# Patient Record
Sex: FEMALE | Race: WHITE | Employment: OTHER | URBAN - METROPOLITAN AREA
[De-identification: names, ages, dates, MRNs, and addresses within clinical notes are randomized per-mention and may not be internally consistent; named-entity substitution may affect disease eponyms.]

---

## 2017-01-13 ENCOUNTER — OFFICE VISIT (OUTPATIENT)
Dept: SURGERY | Age: 52
End: 2017-01-13

## 2017-01-13 VITALS
DIASTOLIC BLOOD PRESSURE: 82 MMHG | RESPIRATION RATE: 18 BRPM | WEIGHT: 231 LBS | SYSTOLIC BLOOD PRESSURE: 124 MMHG | BODY MASS INDEX: 42.51 KG/M2 | TEMPERATURE: 98.1 F | HEART RATE: 84 BPM | HEIGHT: 62 IN | OXYGEN SATURATION: 98 %

## 2017-01-13 DIAGNOSIS — Z98.890 STATUS POST LAPAROSCOPIC NISSEN FUNDOPLICATION: ICD-10-CM

## 2017-01-13 DIAGNOSIS — K22.4 HYPERTONIC ESOPHAGEAL MOTILITY DISORDER: Primary | ICD-10-CM

## 2017-01-13 DIAGNOSIS — K22.4 ESOPHAGEAL SPASM: ICD-10-CM

## 2017-01-13 DIAGNOSIS — K22.4 JACKHAMMER ESOPHAGUS: ICD-10-CM

## 2017-01-13 NOTE — PROGRESS NOTES
Returns to discuss abnormal esophageal manometry results and correlate with Sx  Findings:     1. Esophago-Gastric junction: There is incomplete swallow induced relaxation of the Esophago-gastric junction. The mean IRP is 16.2 (normal is less than 15).     2. Esophageal body: All swallows show normal intact contraction patterns and normal peristaltic vigour. 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 There is no suggestion of distal esophageal spasm.      3. Impedance: There is complete bolus clearance in 8/10 of the swallows     Impression:     1. EGJ outflow obstruction-high IRP, with normal peristalsis. Suggestion of hypercontractile disorder, thought not meeting criteria on this study.     2. Complete clearance of the bolus indicating adequate bolus transit.       Visit Vitals    /82 (BP 1 Location: Left arm, BP Patient Position: Sitting)    Pulse 84    Temp 98.1 °F (36.7 °C) (Oral)    Resp 18    Ht 5' 2\" (1.575 m)    Wt 231 lb (104.8 kg)    SpO2 98%    BMI 42.25 kg/m2     She has lost minimal weight following the procedure done in 12/2014  She has globus sensation intermittently, heartburn is better with prilosec daily now,  Regurgitates at times- usually liquid material. Does agree that dysphagia is worst with liquids and solids  Her diet is now soft processed foods pudding consistency in order to avoid regurgitation spells    Review of records includes  Op report Dec 2014  Procedure: Laparoscopic hiatal hernia repair with Nissen fundic plication and plication of the blind jejunal limb, Repair of Incisional hernia with mesh, Intra-op EGD   Pre-operative Diagnosis: Gastroesophageal reflux disease / afferent limb syndrome, hiatal hernia, incisional hernia   Findings: Hiatal hernia moderate sliding, mobilized gastric fundus to perform fundic wrap with enlarged gastric pouch, elongated afferent blind limb of jejunum, multiple small midline hernia defects found     UGI 2/83 before fundoplication/HH repair      12/2014 UGI after [facility] repair/Nissen      Most recent UGI series- lateral view      Anterior view      Active Ambulatory Problems     Diagnosis Date Noted    Abdominal pain 05/23/2014    GERD (gastroesophageal reflux disease) 12/22/2014    Gastric bypass status for obesity 08/02/2016    Status post Nissen fundoplication 98/09/1172    Reactive hypoglycemia 08/02/2016    Psychiatric disorder     Asthma     Anemia     Hiatal hernia 08/28/2016     Resolved Ambulatory Problems     Diagnosis Date Noted    No Resolved Ambulatory Problems     Past Medical History   Diagnosis Date    Bladder spasms     Unspecified adverse effect of anesthesia     Urinary frequency      Current Outpatient Prescriptions on File Prior to Visit   Medication Sig Dispense Refill    omeprazole (PRILOSEC) 20 mg capsule Take 1 Cap by mouth two (2) times a day. 60 Cap 3    trospium (SANCTURA XL) 60 mg capsule Take 1 Cap by mouth Daily (before breakfast). 30 Cap 6    Lisdexamfetamine (VYVANSE) 70 mg capsule Take 70 mg by mouth every morning.  citalopram (CELEXA) 40 mg tablet Take 40 mg by mouth daily.  dextroamphetamine-amphetamine (ADDERALL) 15 mg tablet Take 15 mg by mouth. No current facility-administered medications on file prior to visit.       ENMT: normocephalic, atraumatic   Respiratory: excursions normal and symmetrical  Cardiovascular: Regular rate and rhythm  Abdomen:  no guarding, no distension  Musculoskeletal: normal gait/station  Neuro: symmetrical  Psych: alert and oriented to person, place and time    Impression  LESP mildly elevated versus normal with high pressure amplitude contraction identified on one wave suggesting jackhammer esophagus  I am concerned this may result from over-tightening of the wrapped segment occurring over about 6-12 months after the procedure-  Scarring may be the reason this was not present earlier post-fundoplication. 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  I have asked her to see Dr. Abdelrahman Taylor and I will call him to discuss options before she has that appointment. She is anxious about more surgery but mostly because she is focused on the po intake limitations which she has been seeking regarding functionality of her gastric bypass. However this situation is clearly sub-optimal from a QOL perspective and I have urged her to allow us to further evaluate and intervene if we can improve her GI sx. Also she is unable to eat the solid healthy foods that are low calorie and would be much better for her regarding Rx of her recurrent morbid obesity. We have had a long wide ranging discussion today and all her / her 's questions have been answered. They would very much be interested in whether or not Dr. Abdelrahman Taylor will offer her a balloon dilatation to try and reduce the LESP. I will call him to discuss. More than 50% of this encounter was spent in direct counseling for this patient regarding the ongoing evaluation and treatment as well as options for future care. All questions have been answered in detail and the patient has expressed satisfaction regarding understanding of all this information. At least 45 minutes were spent in counseling during this encounter.

## 2017-01-13 NOTE — PROGRESS NOTES
1. Have you been to the ER, urgent care clinic since your last visit? Hospitalized since your last visit? No    2. Have you seen or consulted any other health care providers outside of the [address] [place] [address] since your last visit? Include any pap smears or colon screening.  No

## 2017-01-13 NOTE — MR AVS SNAPSHOT
Visit Information Date & Time Provider Department Dept. Phone Encounter #  
 1/13/2017 11:15 AM Reyes Lev, MD [address] [phone] [id number] Upcoming Health Maintenance Date Due Hepatitis C Screening 1965 Pneumococcal 19-64 Medium Risk (1 of 1 - PPSV23) 9/29/1983 DTaP/Tdap/Td series (1 - Tdap) 9/29/1985 PAP AKA CERVICAL CYTOLOGY 9/29/1985 BREAST CANCER SCRN MAMMOGRAM 9/29/2014 FOBT Q 1 YEAR AGE 50-75 9/29/2014 INFLUENZA AGE 9 TO ADULT 8/1/2016 Allergies as of 1/13/2017  Review Complete On: 1/13/2017 By: Dontrell Saez LPN Severity Noted Reaction Type Reactions Erythromycin  05/23/2014    Hives, Nausea and Vomiting, Other (comments) several cramping Erythromycin Base  06/12/2014    Nausea and Vomiting Current Immunizations  Never Reviewed No immunizations on file. Not reviewed this visit You Were Diagnosed With   
  
 Codes Comments Hypertonic esophageal motility disorder    -  Primary ICD-10-CM: K22.4 ICD-9-CM: 530.5 Status post laparoscopic Nissen fundoplication     WJZ-27-GC: C89.356 ICD-9-CM: V45.89 Esophageal spasm     ICD-10-CM: K22.4 ICD-9-CM: 530.5 Jackhammer esophagus     ICD-10-CM: K22.9 ICD-9-CM: 530.9 Vitals BP Pulse Temp Resp Height(growth percentile) Weight(growth percentile) 124/82 (BP 1 Location: Left arm, BP Patient Position: Sitting) 84 98.1 °F (36.7 °C) (Oral) 18 5' 2\" (1.575 m) 231 lb (104.8 kg) SpO2 BMI OB Status Smoking Status 98% 42.25 kg/m2 Hysterectomy Never Smoker BMI and BSA Data Body Mass Index Body Surface Area  
 42.25 kg/m 2 2.14 m 2 Preferred Pharmacy Pharmacy Name Phone [id number] [address] [phone] Your Updated Medication List  
  
   
This list is accurate as of: 1/13/17  1:36 PM.  Always use your most recent med list.  
  
  
  
  
 ADDERALL 15 mg tablet Generic drug:  dextroamphetamine-amphetamine Take 15 mg by mouth. CeleXA 40 mg tablet Generic drug:  citalopram  
Take 40 mg by mouth daily. omeprazole 20 mg capsule Commonly known as:  PRILOSEC Take 1 Cap by mouth two (2) times a day. trospium 60 mg capsule Commonly known as:  SANCTURA XL Take 1 Cap by mouth Daily (before breakfast). VYVANSE 70 mg capsule Generic drug:  Lisdexamfetamine Take 70 mg by mouth every morning. Introducing \[facility] [place] [facility]\"" & [facility] SERVICES! Dear Radha Conception: 
Thank you for requesting a [employer] account. Our records indicate that you already have an active [employer] account. You can access your account anytime at [email or web address] Did you know that you can access your hospital and ER discharge instructions at any time in [employer]? You can also review all of your test results from your hospital stay or ER visit. Additional Information If you have questions, please visit the Frequently Asked Questions section of the [employer] website at [email or web address] Remember, [employer] is NOT to be used for urgent needs. For medical emergencies, dial 911. Now available from your iPhone and Android! Please provide this summary of care documentation to your next provider. Your primary care clinician is listed as Richmond Salas. If you have any questions after today's visit, please call [phone].

## 2017-06-27 RX ORDER — PANTOPRAZOLE SODIUM 40 MG/1
TABLET, DELAYED RELEASE ORAL
Qty: 60 TAB | Refills: 3 | Status: SHIPPED | OUTPATIENT
Start: 2017-06-27 | End: 2017-12-05 | Stop reason: SDUPTHER

## 2017-07-04 DIAGNOSIS — K91.2 POSTOPERATIVE MALABSORPTION: ICD-10-CM

## 2017-11-06 ENCOUNTER — OFFICE VISIT (OUTPATIENT)
Dept: SURGERY | Age: 52
End: 2017-11-06

## 2017-11-06 VITALS
RESPIRATION RATE: 20 BRPM | BODY MASS INDEX: 45.08 KG/M2 | DIASTOLIC BLOOD PRESSURE: 91 MMHG | WEIGHT: 245 LBS | HEIGHT: 62 IN | SYSTOLIC BLOOD PRESSURE: 135 MMHG | HEART RATE: 102 BPM | OXYGEN SATURATION: 97 % | TEMPERATURE: 98.6 F

## 2017-11-06 DIAGNOSIS — K44.9 HIATAL HERNIA WITH GERD: Primary | ICD-10-CM

## 2017-11-06 DIAGNOSIS — Z98.890 STATUS POST LAPAROSCOPIC NISSEN FUNDOPLICATION: ICD-10-CM

## 2017-11-06 DIAGNOSIS — K21.9 HIATAL HERNIA WITH GERD: Primary | ICD-10-CM

## 2017-11-06 DIAGNOSIS — Z98.84 GASTRIC BYPASS STATUS FOR OBESITY: ICD-10-CM

## 2017-11-06 NOTE — PROGRESS NOTES
1. Have you been to the ER, urgent care clinic since your last visit? Hospitalized since your last visit? No    2. Have you seen or consulted any other health care providers outside of the [address] [place] [address] since your last visit? Include any pap smears or colon screening. No    Depression ,nutrition,and abuse screenings done today.

## 2017-11-06 NOTE — PROGRESS NOTES
Patient returns with worsening symptoms  Had EGD by Dr Олег Monroy in Feb with dilatation using 47 F bougie which transiently made her sx better  She has globus sensation when eating, cough often, regurgitation at night which is often food eaten hours earlier  She reports little heartburn  She can only eat soft 'slider' foods or risks having dysphagia  She notes that drinking 1-2 glasses of wine at night seems to make food goes down easier  She does note that regurgitation is actually better than prior to 360 degree Nissen done in 2014  She reports 14 lbs weight gain which she attributes to maladaptive eating of slider foods and liquid calories  Active Ambulatory Problems     Diagnosis Date Noted    Abdominal pain 05/23/2014    GERD (gastroesophageal reflux disease) 12/22/2014    Gastric bypass status for obesity 08/02/2016    Status post Nissen fundoplication 94/58/8082    Reactive hypoglycemia 08/02/2016    Psychiatric disorder     Asthma     Anemia     Hiatal hernia 08/28/2016     Resolved Ambulatory Problems     Diagnosis Date Noted    No Resolved Ambulatory Problems     Past Medical History:   Diagnosis Date    Anemia     Asthma     Bladder spasms     GERD (gastroesophageal reflux disease)     Psychiatric disorder     Unspecified adverse effect of anesthesia     Urinary frequency      Current Outpatient Prescriptions on File Prior to Visit   Medication Sig Dispense Refill    pantoprazole (PROTONIX) 40 mg tablet take 1 tablet by mouth twice a day 60 Tab 3    Lisdexamfetamine (VYVANSE) 70 mg capsule Take 70 mg by mouth every morning.  citalopram (CELEXA) 40 mg tablet Take 40 mg by mouth daily.  dextroamphetamine-amphetamine (ADDERALL) 15 mg tablet Take 15 mg by mouth.  omeprazole (PRILOSEC) 20 mg capsule Take 1 Cap by mouth two (2) times a day. 60 Cap 3    trospium (SANCTURA XL) 60 mg capsule Take 1 Cap by mouth Daily (before breakfast).  30 Cap 6     No current facility-administered medications on file prior to visit. BP (!) 135/91  Pulse (!) 102  Temp 98.6 °F (37 °C)  Resp 20  Ht 5' 2\" (1.575 m)  Wt 245 lb (111.1 kg)  SpO2 97%  BMI 44.81 kg/m2  ENMT: normocephalic, atraumatic   Respiratory: excursions normal and symmetrical  Cardiovascular: Regular rate and rhythm  Abdomen:  Soft, no guarding, no distension, no obvious hernia  Musculoskeletal: normal gait/station  Neuro: symmetrical  Psych: alert and oriented to person, place and time    Anatomic information  EGD by Dr Tanvi Colon-  Z line at 35 cm witih GJ at 40 cm, diaphragm imprint at 38 cm confirming 3 cm hiatal hernia  Previously at surgery the Z line was at 40 cm with GJ at 45 cm  This suggests slip of the Nissen as the anatomic landmarks are 5 cm shorter in distance from the incisors. UGI reviewed confirming that wrap appears to have slipped     Assessment  This patient is likely experiencing complications related to Slipped Nissen with obstructive symptoms / dysphagia and recurrent GERD / hiatal hernia. She is unable to tolerate more solid diet. Weight regain continues due in part to maladaptive eating from obstruction from slipped Nissen causing solid food dysphagia  I have reviewed the anatomic situation in detail with her and provided her with a drawing of the situation and the proposed procedure to try and improve her ongoing issues. 1.  Slipped Nissen and dysphagia with recurrent GERD symptoms and hiatal hernia- recommend opening wrap to convert to partial fundoplication with surgical relocation of proximal stomach into abdomen and repair of hiatal hernia, possible bio mesh reinforcement, possible Hill gastropexy to anchor pouch in abdomen. 2. Elongated blind afferent limb-  This may contribute to both GERD (and increased eating capacity) and was not addressed at the previous procedure in 2014 due to simultaneous mesh hernia repair at that time.   This blind limb should be resected with narrowing of the GJ outlet if judged to be quite dilated via intra-op endoscopy (not mentioned by Dr Rico Miranda)  3. 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 Elongation of the Rula limb to long limb gastric bypass length of 150 cm would potentially eliminate any contribution that this short limb may have to her ongoing reflux symptoms. She is requesting surgery- she wrote a long review of her situation and concluded it with a paragraph requesting surgical treatment which she read to me during our appointment. More than 50% of this encounter was spent in direct counseling for this patient Counseling included topics such as the ongoing evaluation and treatment as well as options for future care. All questions have been answered in detail and the patient has expressed satisfaction regarding understanding of all this information. At least 45 minutes were spent in direct patient contact including more than 50% of the time spent directly counseling the patient as above during this encounter.

## 2017-12-05 RX ORDER — PANTOPRAZOLE SODIUM 40 MG/1
TABLET, DELAYED RELEASE ORAL
Qty: 60 TAB | Refills: 3 | Status: SHIPPED | OUTPATIENT
Start: 2017-12-05 | End: 2018-03-30 | Stop reason: ALTCHOICE

## 2018-01-03 ENCOUNTER — OFFICE VISIT (OUTPATIENT)
Dept: SURGERY | Age: 53
End: 2018-01-03

## 2018-01-03 VITALS
DIASTOLIC BLOOD PRESSURE: 80 MMHG | RESPIRATION RATE: 20 BRPM | HEIGHT: 62 IN | TEMPERATURE: 97.8 F | BODY MASS INDEX: 44.9 KG/M2 | HEART RATE: 102 BPM | WEIGHT: 244 LBS | OXYGEN SATURATION: 95 % | SYSTOLIC BLOOD PRESSURE: 151 MMHG

## 2018-01-03 VITALS
HEIGHT: 62 IN | SYSTOLIC BLOOD PRESSURE: 151 MMHG | DIASTOLIC BLOOD PRESSURE: 80 MMHG | HEART RATE: 102 BPM | WEIGHT: 244 LBS | OXYGEN SATURATION: 95 % | RESPIRATION RATE: 20 BRPM | BODY MASS INDEX: 44.9 KG/M2 | TEMPERATURE: 97.8 F

## 2018-01-03 DIAGNOSIS — Z98.84 GASTRIC BYPASS STATUS FOR OBESITY: ICD-10-CM

## 2018-01-03 DIAGNOSIS — K21.9 GASTROESOPHAGEAL REFLUX DISEASE WITHOUT ESOPHAGITIS: Primary | ICD-10-CM

## 2018-01-03 DIAGNOSIS — K44.9 HIATAL HERNIA: Primary | ICD-10-CM

## 2018-01-03 DIAGNOSIS — K44.9 HIATAL HERNIA: ICD-10-CM

## 2018-01-03 DIAGNOSIS — Z98.890 STATUS POST NISSEN FUNDOPLICATION: ICD-10-CM

## 2018-01-03 DIAGNOSIS — Z01.818 PREOP GENERAL PHYSICAL EXAM: ICD-10-CM

## 2018-01-03 PROBLEM — E66.01 OBESITY, MORBID (HCC): Status: ACTIVE | Noted: 2018-01-03

## 2018-01-03 NOTE — PROGRESS NOTES
1. Have you been to the ER, urgent care clinic since your last visit? Hospitalized since your last visit? No    2. Have you seen or consulted any other health care providers outside of the [address] [place] [address] since your last visit? Include any pap smears or colon screening.  No

## 2018-01-03 NOTE — PROGRESS NOTES
Surgery History and Physical  Annette Borrego is a 47 y.o. female scheduled for laparoscopic revision of Nissen Fundoplication with mesh and resection of afferent blind limb with Dr. Surjit Godinez on January 24, 2018 at [facility]. Patient comes in office today for history and physical, and to receive preoperative liver shrinking diet. Patient with original gastric bypass in 2004. Patient underwent Nissen Fundoplication in 3202 for GERD symptoms. Patient still experiencing of reflux related to Slipped Nissen with obstructive symptoms / dysphagia and recurrent GERD / hiatal hernia found on Upper GI.   Patient Active Problem List    Diagnosis Date Noted    Obesity, morbid ([address] 01/03/2018    Hiatal hernia 08/28/2016    Psychiatric disorder     Asthma     Anemia     Gastric bypass status for obesity 08/02/2016    Status post Nissen fundoplication 21/22/3854    Reactive hypoglycemia 08/02/2016    GERD (gastroesophageal reflux disease) 12/22/2014    Abdominal pain 05/23/2014     Past Medical History:   Diagnosis Date    Anemia     Asthma     in the past, no current issues    Bladder spasms     GERD (gastroesophageal reflux disease)     Psychiatric disorder     ADHD/depression/anxiety    Unspecified adverse effect of anesthesia     Inability to urinate after all sx's with general anesthesia    Urinary frequency       Past Surgical History:   Procedure Laterality Date    FULL ESOPHAGEAL MANOMETRY  12/18/2016         HX BREAST REDUCTION      2006    HX CHOLECYSTECTOMY      87    HX GASTRIC BYPASS      2004    HX GASTRIC BYPASS      12/22/14 Laparoscopic hiatal hernia repair with Nissen fundic plication and plication of the blind jejunal limb, Repair of Incisional hernia with mesh, Intra-op EGD    HX GYN      ovarian cyst    HX HERNIA REPAIR      06 and 2016    HX HYSTERECTOMY      2013    HX OTHER SURGICAL      A & P repair 2005, bladder    HX OVARIAN CYST REMOVAL      83      Social History   Substance Use Topics    Smoking status: Never Smoker    Smokeless tobacco: Never Used    Alcohol use 0.0 oz/week      Family History   Problem Relation Age of Onset    Hypertension Mother     Heart Disease Mother     Diabetes Mother     Hypertension Father     Diabetes Father     Hypertension Brother       Prior to Admission medications    Medication Sig Start Date End Date Taking? Authorizing Provider   pantoprazole (PROTONIX) 40 mg tablet take 1 tablet by mouth twice a day 12/5/17  Yes Monae Thompson MD   trospium (SANCTURA XL) 60 mg capsule Take 1 Cap by mouth Daily (before breakfast). 8/31/16  Yes Vladislav James MD   Lisdexamfetamine (VYVANSE) 70 mg capsule Take 70 mg by mouth every morning. Yes Historical Provider   citalopram (CELEXA) 40 mg tablet Take 40 mg by mouth daily. Yes Historical Provider   dextroamphetamine-amphetamine (ADDERALL) 15 mg tablet Take 15 mg by mouth. Yes Historical Provider     Allergies   Allergen Reactions    Erythromycin Hives, Nausea and Vomiting and Other (comments)     several cramping    Erythromycin Base Nausea and Vomiting        HPI    Review of Systems   Constitutional: Negative for chills, fever and malaise/fatigue. Respiratory: Negative for cough, sputum production and shortness of breath. Cardiovascular: Negative for chest pain and palpitations. Gastrointestinal: Positive for vomiting. Negative for abdominal pain, blood in stool, constipation, diarrhea and heartburn. Genitourinary: Positive for urgency. No kidney stone. Musculoskeletal: Positive for joint pain. Skin: Negative for itching and rash. Neurological: Negative for dizziness, seizures, loss of consciousness and headaches. Endo/Heme/Allergies:        No diabetes, no thyroid disease. , no anemia, and no gout. Psychiatric/Behavioral: Negative for hallucinations, memory loss, substance abuse and suicidal ideas. The patient is nervous/anxious.  The patient does not have insomnia. Physical Exam   Constitutional: She is oriented to person, place, and time. She appears well-developed and well-nourished. No distress. Eyes: Pupils are equal, round, and reactive to light. Neck: Normal range of motion. Neck supple. Cardiovascular: Normal rate, regular rhythm, S1 normal, S2 normal, normal heart sounds, intact distal pulses and normal pulses. Exam reveals no gallop. No murmur heard. Pulmonary/Chest: Effort normal and breath sounds normal. No respiratory distress. She has no decreased breath sounds. She has no wheezes. She has no rhonchi. She has no rales. She exhibits no crepitus, no edema and no retraction. Abdominal: Soft. Normal appearance and bowel sounds are normal. She exhibits no distension. There is no tenderness. There is no rebound and no guarding. Hernia confirmed negative in the ventral area. Abdomen obese. Previous surgical sites dry and intact. No hernia/masses palpated   Musculoskeletal: Normal range of motion. She exhibits no edema. Lymphadenopathy:        Head (right side): No submental, no submandibular, no tonsillar, no preauricular and no posterior auricular adenopathy present. Head (left side): No submental, no submandibular, no tonsillar, no preauricular and no posterior auricular adenopathy present. She has no cervical adenopathy. She has no axillary adenopathy. Neurological: She is alert and oriented to person, place, and time. She has normal strength. No sensory deficit. She displays a negative Romberg sign. Skin: Skin is warm and dry. No rash noted. No cyanosis or erythema. Nails show no clubbing. Psychiatric: She has a normal mood and affect. Her speech is normal and behavior is normal. Judgment and thought content normal. Cognition and memory are normal.     Blood pressure 151/80, pulse (!) 102, temperature 97.8 °F (36.6 °C), resp.  rate 20, height 5' 2\" (1.575 m), weight 244 lb (110.7 kg), SpO2 95 %. ASSESSMENT and PLAN  Patient is scheduled for laparoscopic revision of Nissen Fundoplication with mesh and resection of afferent blind limb with Dr. Peggy Laureano on January 24, 2018 at [facility] patient in regard to post diet restrictions, follow- up office visits, follow- up care, and follow up medications. Patient as received educational booklet and vitamin list. Patient to start pre-operative liver shrinking diet on January 10, 2018. Answered all questions and patient wishes to proceed. Signed By: Loreatha Kayser, NP     January 5, 2018       27 minutes was spent with patient.

## 2018-01-03 NOTE — MR AVS SNAPSHOT
Visit Information Date & Time Provider Department Dept. Phone Encounter #  
 1/3/2018  3:40 PM Maryanne Kelly NP [facility] 22 [phone] [id number] Your Appointments 2/7/2018 11:40 AM  
POST OP 10 MIN with Maryanne Kelly NP  
[facility] 22 [phone] ([facility]) Appt Note: 1 po / 2week f/u  
 [address] [facility] [phone]  
[address] [phone]  
  
    
 2/21/2018 11:00 AM  
POST OP 10 MIN with Maryanne Kelly NP  
[facility] 22 884 ([facility]) Appt Note: 2 po 4 week f/u  
 [address] [facility] [phone]  
[phone]  
  
    
 3/7/2018  9:40 AM  
POST OP 10 MIN with Maryanne Kelly NP  
[facility] 22 472 ([facility]) Appt Note: 3 po 6 week f/u  
 [address] [facility] 7 [id number] [phone] Upcoming Health Maintenance Date Due Hepatitis C Screening 1965 Pneumococcal 19-64 Medium Risk (1 of 1 - PPSV23) 9/29/1983 DTaP/Tdap/Td series (1 - Tdap) 9/29/1985 PAP AKA CERVICAL CYTOLOGY 9/29/1985 FOBT Q 1 YEAR AGE 50-75 9/29/2014 Influenza Age 5 to Adult 8/1/2017 BREAST CANCER SCRN MAMMOGRAM 8/17/2019 Allergies as of 1/3/2018  Review Complete On: 1/3/2018 By: Maryanne Kelly NP Severity Noted Reaction Type Reactions Erythromycin  05/23/2014    Hives, Nausea and Vomiting, Other (comments) several cramping Erythromycin Base  06/12/2014    Nausea and Vomiting Current Immunizations  Never Reviewed No immunizations on file. Not reviewed this visit Vitals BP Pulse Temp Resp Height(growth percentile) Weight(growth percentile) 151/80 (!) 102 97.8 °F (36.6 °C) 20 5' 2\" (1.575 m) 244 lb (110.7 kg) SpO2 BMI OB Status Smoking Status 95% 44.63 kg/m2 Hysterectomy Never Smoker BMI and BSA Data Body Mass Index Body Surface Area  
 44.63 kg/m 2 2.2 m 2 Preferred Pharmacy Pharmacy Name Phone [id number] [place] [address] [phone] Your Updated Medication List  
  
   
This list is accurate as of: 1/3/18 11:59 PM.  Always use your most recent med list.  
  
  
  
  
 ADDERALL 15 mg tablet Generic drug:  dextroamphetamine-amphetamine Take 15 mg by mouth. CeleXA 40 mg tablet Generic drug:  citalopram  
Take 40 mg by mouth daily. pantoprazole 40 mg tablet Commonly known as:  PROTONIX  
take 1 tablet by mouth twice a day  
  
 trospium 60 mg capsule Commonly known as:  SANCTURA XL Take 1 Cap by mouth Daily (before breakfast). VYVANSE 70 mg capsule Generic drug:  Lisdexamfetamine Take 70 mg by mouth every morning. To-Do List   
 01/11/2018 9:30 AM  
  Appointment with [facility] PAT EXAM RM 6 at [address] [place] [address] ([phone]) Introducing [facility] & [facility]! Dear Zack Stafford: 
Thank you for requesting a MovingWorlds account. Our records indicate that you already have an active MovingWorlds account. You can access your account anytime at [email or web address] Did you know that you can access your hospital and ER discharge instructions at any time in MovingWorlds? You can also review all of your test results from your hospital stay or ER visit. Additional Information If you have questions, please visit the Frequently Asked Questions section of the MovingWorlds website at [email or web address] Remember, MovingWorlds is NOT to be used for urgent needs. For medical emergencies, dial 911. Now available from your iPhone and Android! Please provide this summary of care documentation to your next provider. Your primary care clinician is listed as Shadia Stratton.  If you have any questions after today's visit, please call [phone].

## 2018-01-03 NOTE — PROGRESS NOTES
1. Have you been to the ER, urgent care clinic since your last visit? Hospitalized since your last visit? No    2. Have you seen or consulted any other health care providers outside of the [address] [place] [address] since your last visit? Include any pap smears or colon screening.  No

## 2018-01-03 NOTE — MR AVS SNAPSHOT
Visit Information Date & Time Provider Department Dept. Phone Encounter #  
 1/3/2018  2:45 PM Juancarlos Hoang [facility] [address] [phone] [id number] Upcoming Health Maintenance Date Due Hepatitis C Screening 1965 Pneumococcal 19-64 Medium Risk (1 of 1 - PPSV23) 9/29/1983 DTaP/Tdap/Td series (1 - Tdap) 9/29/1985 PAP AKA CERVICAL CYTOLOGY 9/29/1985 FOBT Q 1 YEAR AGE 50-75 9/29/2014 Influenza Age 5 to Adult 8/1/2017 BREAST CANCER SCRN MAMMOGRAM 8/17/2019 Allergies as of 1/3/2018  Review Complete On: 1/3/2018 By: Vish Pérez LPN Severity Noted Reaction Type Reactions Erythromycin  05/23/2014    Hives, Nausea and Vomiting, Other (comments) several cramping Erythromycin Base  06/12/2014    Nausea and Vomiting Current Immunizations  Never Reviewed No immunizations on file. Not reviewed this visit Vitals BP Pulse Temp Resp Height(growth percentile) Weight(growth percentile) 151/80 (BP 1 Location: Left arm, BP Patient Position: Sitting) (!) 102 97.8 °F (36.6 °C) (Oral) 20 5' 2\" (1.575 m) 244 lb (110.7 kg) SpO2 BMI OB Status Smoking Status 95% 44.63 kg/m2 Hysterectomy Never Smoker BMI and BSA Data Body Mass Index Body Surface Area  
 44.63 kg/m 2 2.2 m 2 Preferred Pharmacy Pharmacy Name Phone [id number] [place] [address] [phone] Your Updated Medication List  
  
   
This list is accurate as of: 1/3/18  4:05 PM.  Always use your most recent med list.  
  
  
  
  
 ADDERALL 15 mg tablet Generic drug:  dextroamphetamine-amphetamine Take 15 mg by mouth. CeleXA 40 mg tablet Generic drug:  citalopram  
Take 40 mg by mouth daily. pantoprazole 40 mg tablet Commonly known as:  PROTONIX  
take 1 tablet by mouth twice a day  
  
 trospium 60 mg capsule Commonly known as:  SANCTURA XL  
 Take 1 Cap by mouth Daily (before breakfast). VYVANSE 70 mg capsule Generic drug:  Lisdexamfetamine Take 70 mg by mouth every morning. To-Do List   
 01/11/2018 9:30 AM  
  Appointment with [facility] PAT EXAM RM 6 at [address] [facility] ([phone]) Introducing \[facility]\"" & HEALTH SERVICES! Dear Claus Cons: 
Thank you for requesting a Adormo account. Our records indicate that you already have an active Adormo account. You can access your account anytime at [email or web address] Did you know that you can access your hospital and ER discharge instructions at any time in Adormo? You can also review all of your test results from your hospital stay or ER visit. Additional Information If you have questions, please visit the Frequently Asked Questions section of the Ad[employer]o website at [email or web address] Remember, Adormo is NOT to be used for urgent needs. For medical emergencies, dial 911. Now available from your iPhone and Android! Please provide this summary of care documentation to your next provider. Your primary care clinician is listed as Octaviano Hernandez. If you have any questions after today's visit, please call [phone].

## 2018-01-04 NOTE — PROGRESS NOTES
Patient seen for consent process with  present today  She and I previously reviewed the surgical procedure plan in detail during her visit on Nov 6th which includes the following considerations-  She will also have full H&P done today by NP    This patient is likely experiencing complications related to Slipped Nissen with obstructive symptoms / dysphagia and recurrent GERD / hiatal hernia. She is unable to tolerate more solid diet. Weight regain continues due in part to maladaptive eating from obstruction from slipped Nissen causing solid food dysphagia  I have reviewed the anatomic situation in detail with her and  today and provided her with a drawing of the situation and the proposed procedure to try and improve her ongoing issues. 1.  Slipped Nissen and dysphagia with recurrent GERD symptoms and hiatal hernia- recommend opening wrap to convert to partial fundoplication with surgical relocation of proximal stomach into abdomen and repair of hiatal hernia, possible bio mesh reinforcement, possible Hill gastropexy to anchor pouch in abdomen. 2. Elongated blind afferent limb-  This may contribute to both GERD (and increased eating capacity) and was not addressed at the previous procedure in 2014 due to simultaneous mesh hernia repair at that time. This blind limb should be resected with narrowing of the [address] [place] [address] outlet if judged to be quite dilated via intra-op endoscopy (not mentioned by Dr Denis Hargrove)  3. 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   Elongation of the Rula limb to long limb gastric bypass length of 150 cm would potentially eliminate any contribution that this short limb may have to her ongoing reflux symptoms.       She is adamantly requesting surgery-  Have seriously discussed 3-5x increased risk in this setting regarding revisional surgery and particularly increased due to previous fundoplication and revision surgery. Patient knows that all complications of gastric bypass can occur after re-do gastric bypass and are at higher risk including bleed, infection, leak, blood clots, obstruction, intestinal complications, etc.  Patient knows the surgical procedure will be significantly longer and therefore carry more risk. Patient knows there is a higher risk of open conversion. Patient appears to understand and accept these risks and is asking us to move forward with authorization / performance of the revision procedure despite the complexity and the significant increased risk of all complications including mortality which was specifically mentioned in order to help the patient understand the seriousness of this decision.      More than 50% of this encounter was spent in direct counseling for this patient Counseling included topics such as the ongoing evaluation and treatment as well as options for future care. All questions have been answered in detail and the patient has expressed satisfaction regarding understanding of all this information. At least 45 minutes were spent in direct patient contact including more than 50% of the time spent directly counseling the patient as above during this encounter.

## 2018-01-11 ENCOUNTER — HOSPITAL ENCOUNTER (OUTPATIENT)
Dept: PREADMISSION TESTING | Age: 53
Discharge: HOME | End: 2018-01-11
Payer: COMMERCIAL

## 2018-01-11 ENCOUNTER — HOSPITAL ENCOUNTER (OUTPATIENT)
Dept: GENERAL RADIOLOGY | Age: 53
Discharge: HOME | End: 2018-01-11
Attending: SURGERY
Payer: COMMERCIAL

## 2018-01-11 VITALS
TEMPERATURE: 98.3 F | RESPIRATION RATE: 20 BRPM | DIASTOLIC BLOOD PRESSURE: 84 MMHG | BODY MASS INDEX: 45.08 KG/M2 | WEIGHT: 245 LBS | HEART RATE: 90 BPM | SYSTOLIC BLOOD PRESSURE: 123 MMHG | HEIGHT: 62 IN

## 2018-01-11 LAB
ALBUMIN SERPL-MCNC: 3.6 G/DL (ref 3.5–5)
ALBUMIN/GLOB SERPL: 1.2 {RATIO} (ref 1.1–2.2)
ALP SERPL-CCNC: 122 U/L (ref 45–117)
ALT SERPL-CCNC: 19 U/L (ref 12–78)
ANION GAP SERPL CALC-SCNC: 6 MMOL/L (ref 5–15)
APTT PPP: 27.3 SEC (ref 22.1–32.5)
AST SERPL-CCNC: 12 U/L (ref 15–37)
ATRIAL RATE: 84 BPM
BASOPHILS # BLD: 0 K/UL (ref 0–0.1)
BASOPHILS NFR BLD: 0 % (ref 0–1)
BILIRUB SERPL-MCNC: 0.2 MG/DL (ref 0.2–1)
BUN SERPL-MCNC: 12 MG/DL (ref 6–20)
BUN/CREAT SERPL: 20 (ref 12–20)
CALCIUM SERPL-MCNC: 8.8 MG/DL (ref 8.5–10.1)
CALCULATED P AXIS, ECG09: 24 DEGREES
CALCULATED R AXIS, ECG10: 22 DEGREES
CALCULATED T AXIS, ECG11: 18 DEGREES
CHLORIDE SERPL-SCNC: 106 MMOL/L (ref 97–108)
CO2 SERPL-SCNC: 29 MMOL/L (ref 21–32)
CREAT SERPL-MCNC: 0.61 MG/DL (ref 0.55–1.02)
DIAGNOSIS, 93000: NORMAL
EOSINOPHIL # BLD: 0.2 K/UL (ref 0–0.4)
EOSINOPHIL NFR BLD: 2 % (ref 0–7)
ERYTHROCYTE [DISTWIDTH] IN BLOOD BY AUTOMATED COUNT: 14.5 % (ref 11.5–14.5)
GLOBULIN SER CALC-MCNC: 3.1 G/DL (ref 2–4)
GLUCOSE SERPL-MCNC: 106 MG/DL (ref 65–100)
HCT VFR BLD AUTO: 35.7 % (ref 35–47)
HGB BLD-MCNC: 11.4 G/DL (ref 11.5–16)
INR PPP: 1 (ref 0.9–1.1)
LYMPHOCYTES # BLD: 2.1 K/UL (ref 0.8–3.5)
LYMPHOCYTES NFR BLD: 27 % (ref 12–49)
MCH RBC QN AUTO: 28.5 PG (ref 26–34)
MCHC RBC AUTO-ENTMCNC: 31.9 G/DL (ref 30–36.5)
MCV RBC AUTO: 89.3 FL (ref 80–99)
MONOCYTES # BLD: 0.4 K/UL (ref 0–1)
MONOCYTES NFR BLD: 5 % (ref 5–13)
NEUTS SEG # BLD: 5 K/UL (ref 1.8–8)
NEUTS SEG NFR BLD: 66 % (ref 32–75)
P-R INTERVAL, ECG05: 150 MS
PLATELET # BLD AUTO: 291 K/UL (ref 150–400)
POTASSIUM SERPL-SCNC: 4 MMOL/L (ref 3.5–5.1)
PROT SERPL-MCNC: 6.7 G/DL (ref 6.4–8.2)
PROTHROMBIN TIME: 9.9 SEC (ref 9–11.1)
Q-T INTERVAL, ECG07: 392 MS
QRS DURATION, ECG06: 78 MS
QTC CALCULATION (BEZET), ECG08: 463 MS
RBC # BLD AUTO: 4 M/UL (ref 3.8–5.2)
SODIUM SERPL-SCNC: 141 MMOL/L (ref 136–145)
THERAPEUTIC RANGE,PTTT: NORMAL SECS (ref 58–77)
VENTRICULAR RATE, ECG03: 84 BPM
WBC # BLD AUTO: 7.6 K/UL (ref 3.6–11)

## 2018-01-11 PROCEDURE — 71046 X-RAY EXAM CHEST 2 VIEWS: CPT

## 2018-01-11 PROCEDURE — 85610 PROTHROMBIN TIME: CPT | Performed by: SURGERY

## 2018-01-11 PROCEDURE — 80053 COMPREHEN METABOLIC PANEL: CPT | Performed by: SURGERY

## 2018-01-11 PROCEDURE — 93005 ELECTROCARDIOGRAM TRACING: CPT

## 2018-01-11 PROCEDURE — 85730 THROMBOPLASTIN TIME PARTIAL: CPT | Performed by: SURGERY

## 2018-01-11 PROCEDURE — 85025 COMPLETE CBC W/AUTO DIFF WBC: CPT | Performed by: SURGERY

## 2018-01-11 RX ORDER — DEXTROMETHORPHAN HYDROBROMIDE, GUAIFENESIN 5; 100 MG/5ML; MG/5ML
650 LIQUID ORAL
COMMUNITY
End: 2018-01-26

## 2018-01-23 ENCOUNTER — ANESTHESIA EVENT (OUTPATIENT)
Dept: SURGERY | Age: 53
DRG: 327 | End: 2018-01-23
Payer: COMMERCIAL

## 2018-01-24 ENCOUNTER — HOSPITAL ENCOUNTER (INPATIENT)
Age: 53
LOS: 2 days | Discharge: HOME | DRG: 327 | End: 2018-01-26
Attending: SURGERY | Admitting: SURGERY
Payer: COMMERCIAL

## 2018-01-24 ENCOUNTER — ANESTHESIA (OUTPATIENT)
Dept: SURGERY | Age: 53
DRG: 327 | End: 2018-01-24
Payer: COMMERCIAL

## 2018-01-24 DIAGNOSIS — Z98.890 STATUS POST NISSEN FUNDOPLICATION: Primary | ICD-10-CM

## 2018-01-24 PROCEDURE — 74011000250 HC RX REV CODE- 250: Performed by: SURGERY

## 2018-01-24 PROCEDURE — 77030032490 HC SLV COMPR SCD KNE COVD -B: Performed by: SURGERY

## 2018-01-24 PROCEDURE — 0DV44ZZ RESTRICTION OF ESOPHAGOGASTRIC JUNCTION, PERCUTANEOUS ENDOSCOPIC APPROACH: ICD-10-PCS | Performed by: SURGERY

## 2018-01-24 PROCEDURE — C1781 MESH (IMPLANTABLE): HCPCS | Performed by: SURGERY

## 2018-01-24 PROCEDURE — 0DNW4ZZ RELEASE PERITONEUM, PERCUTANEOUS ENDOSCOPIC APPROACH: ICD-10-PCS | Performed by: SURGERY

## 2018-01-24 PROCEDURE — 76060000037 HC ANESTHESIA 3 TO 3.5 HR: Performed by: SURGERY

## 2018-01-24 PROCEDURE — 0BUT4JZ SUPPLEMENT DIAPHRAGM WITH SYNTHETIC SUBSTITUTE, PERCUTANEOUS ENDOSCOPIC APPROACH: ICD-10-PCS | Performed by: SURGERY

## 2018-01-24 PROCEDURE — 74011250636 HC RX REV CODE- 250/636

## 2018-01-24 PROCEDURE — 74011250636 HC RX REV CODE- 250/636: Performed by: ANESTHESIOLOGY

## 2018-01-24 PROCEDURE — 0DJ08ZZ INSPECTION OF UPPER INTESTINAL TRACT, VIA NATURAL OR ARTIFICIAL OPENING ENDOSCOPIC: ICD-10-PCS | Performed by: SURGERY

## 2018-01-24 PROCEDURE — 77030002933 HC SUT MCRYL J&J -A: Performed by: SURGERY

## 2018-01-24 PROCEDURE — 77030016151 HC PROTCTR LNS DFOG COVD -B: Performed by: SURGERY

## 2018-01-24 PROCEDURE — 77030020747 HC TU INSUF ENDOSC TELE -A: Performed by: SURGERY

## 2018-01-24 PROCEDURE — 74011250636 HC RX REV CODE- 250/636: Performed by: SURGERY

## 2018-01-24 PROCEDURE — 77030020263 HC SOL INJ SOD CL0.9% LFCR 1000ML: Performed by: SURGERY

## 2018-01-24 PROCEDURE — 77030018836 HC SOL IRR NACL ICUM -A: Performed by: SURGERY

## 2018-01-24 PROCEDURE — 77030009957 HC RELD ENDOSTCH COVD -C: Performed by: SURGERY

## 2018-01-24 PROCEDURE — 77030035045 HC TRCR ENDOSC VRSPRT BLDLSS COVD -B: Performed by: SURGERY

## 2018-01-24 PROCEDURE — 77030010507 HC ADH SKN DERMBND J&J -B: Performed by: SURGERY

## 2018-01-24 PROCEDURE — 77030009956 HC RELD ENDOSTCH COVD -B: Performed by: SURGERY

## 2018-01-24 PROCEDURE — 77030018846 HC SOL IRR STRL H20 ICUM -A: Performed by: SURGERY

## 2018-01-24 PROCEDURE — 77030032435: Performed by: SURGERY

## 2018-01-24 PROCEDURE — 77030035030 HC NDL INSUF VERES 150ML DISP COVD -B: Performed by: SURGERY

## 2018-01-24 PROCEDURE — 74011000250 HC RX REV CODE- 250

## 2018-01-24 PROCEDURE — 77030035051: Performed by: SURGERY

## 2018-01-24 PROCEDURE — 77030034850: Performed by: SURGERY

## 2018-01-24 PROCEDURE — 77030034701 HC DSCRTR CRDLS U/S DISP COVD -F: Performed by: SURGERY

## 2018-01-24 PROCEDURE — 77030035048 HC TRCR ENDOSC OPTCL COVD -B: Performed by: SURGERY

## 2018-01-24 PROCEDURE — 77030008684 HC TU ET CUF COVD -B: Performed by: ANESTHESIOLOGY

## 2018-01-24 PROCEDURE — 77030008756 HC TU IRR SUC STRY -B: Performed by: SURGERY

## 2018-01-24 PROCEDURE — 65660000000 HC RM CCU STEPDOWN

## 2018-01-24 PROCEDURE — 77030020782 HC GWN BAIR PAWS FLX 3M -B

## 2018-01-24 PROCEDURE — 76210000016 HC OR PH I REC 1 TO 1.5 HR: Performed by: SURGERY

## 2018-01-24 PROCEDURE — 76010000173 HC OR TIME 3 TO 3.5 HR INTENSV-TIER 1: Performed by: SURGERY

## 2018-01-24 PROCEDURE — 77030038157 HC DEV PWR CNTR DISP SIGNIA COVD -C: Performed by: SURGERY

## 2018-01-24 PROCEDURE — 74011258636 HC RX REV CODE- 258/636: Performed by: SURGERY

## 2018-01-24 PROCEDURE — 77030031139 HC SUT VCRL2 J&J -A: Performed by: SURGERY

## 2018-01-24 PROCEDURE — 77030011640 HC PAD GRND REM COVD -A: Performed by: SURGERY

## 2018-01-24 DEVICE — MESH HERN W7XL10CM SYN TISS REINF BIOABSRB DISP BIO-A: Type: IMPLANTABLE DEVICE | Site: OTHER | Status: FUNCTIONAL

## 2018-01-24 RX ORDER — SODIUM CHLORIDE 0.9 % (FLUSH) 0.9 %
5-10 SYRINGE (ML) INJECTION EVERY 8 HOURS
Status: DISCONTINUED | OUTPATIENT
Start: 2018-01-24 | End: 2018-01-24 | Stop reason: HOSPADM

## 2018-01-24 RX ORDER — ONDANSETRON 2 MG/ML
4 INJECTION INTRAMUSCULAR; INTRAVENOUS
Status: DISCONTINUED | OUTPATIENT
Start: 2018-01-24 | End: 2018-01-26 | Stop reason: HOSPADM

## 2018-01-24 RX ORDER — MIDAZOLAM HYDROCHLORIDE 1 MG/ML
INJECTION, SOLUTION INTRAMUSCULAR; INTRAVENOUS AS NEEDED
Status: DISCONTINUED | OUTPATIENT
Start: 2018-01-24 | End: 2018-01-24 | Stop reason: HOSPADM

## 2018-01-24 RX ORDER — LIDOCAINE HYDROCHLORIDE 10 MG/ML
0.1 INJECTION, SOLUTION EPIDURAL; INFILTRATION; INTRACAUDAL; PERINEURAL AS NEEDED
Status: DISCONTINUED | OUTPATIENT
Start: 2018-01-24 | End: 2018-01-24 | Stop reason: HOSPADM

## 2018-01-24 RX ORDER — NALOXONE HYDROCHLORIDE 0.4 MG/ML
0.4 INJECTION, SOLUTION INTRAMUSCULAR; INTRAVENOUS; SUBCUTANEOUS AS NEEDED
Status: DISCONTINUED | OUTPATIENT
Start: 2018-01-24 | End: 2018-01-26 | Stop reason: HOSPADM

## 2018-01-24 RX ORDER — SODIUM CHLORIDE, SODIUM LACTATE, POTASSIUM CHLORIDE, CALCIUM CHLORIDE 600; 310; 30; 20 MG/100ML; MG/100ML; MG/100ML; MG/100ML
50 INJECTION, SOLUTION INTRAVENOUS CONTINUOUS
Status: DISCONTINUED | OUTPATIENT
Start: 2018-01-24 | End: 2018-01-24 | Stop reason: HOSPADM

## 2018-01-24 RX ORDER — DIPHENHYDRAMINE HYDROCHLORIDE 50 MG/ML
12.5 INJECTION, SOLUTION INTRAMUSCULAR; INTRAVENOUS
Status: ACTIVE | OUTPATIENT
Start: 2018-01-24 | End: 2018-01-25

## 2018-01-24 RX ORDER — BUPIVACAINE HYDROCHLORIDE 2.5 MG/ML
60 INJECTION, SOLUTION EPIDURAL; INFILTRATION; INTRACAUDAL ONCE
Status: COMPLETED | OUTPATIENT
Start: 2018-01-24 | End: 2018-01-24

## 2018-01-24 RX ORDER — PHENYLEPHRINE HCL IN 0.9% NACL 0.4MG/10ML
SYRINGE (ML) INTRAVENOUS AS NEEDED
Status: DISCONTINUED | OUTPATIENT
Start: 2018-01-24 | End: 2018-01-24 | Stop reason: HOSPADM

## 2018-01-24 RX ORDER — FENTANYL CITRATE 50 UG/ML
25 INJECTION, SOLUTION INTRAMUSCULAR; INTRAVENOUS
Status: COMPLETED | OUTPATIENT
Start: 2018-01-24 | End: 2018-01-24

## 2018-01-24 RX ORDER — FENTANYL CITRATE 50 UG/ML
INJECTION, SOLUTION INTRAMUSCULAR; INTRAVENOUS AS NEEDED
Status: DISCONTINUED | OUTPATIENT
Start: 2018-01-24 | End: 2018-01-24 | Stop reason: HOSPADM

## 2018-01-24 RX ORDER — NEOSTIGMINE METHYLSULFATE 1 MG/ML
INJECTION INTRAVENOUS AS NEEDED
Status: DISCONTINUED | OUTPATIENT
Start: 2018-01-24 | End: 2018-01-24 | Stop reason: HOSPADM

## 2018-01-24 RX ORDER — CEFAZOLIN SODIUM/WATER 2 G/20 ML
2 SYRINGE (ML) INTRAVENOUS ONCE
Status: COMPLETED | OUTPATIENT
Start: 2018-01-24 | End: 2018-01-24

## 2018-01-24 RX ORDER — HYDROMORPHONE HYDROCHLORIDE 1 MG/ML
0.2 INJECTION, SOLUTION INTRAMUSCULAR; INTRAVENOUS; SUBCUTANEOUS
Status: DISCONTINUED | OUTPATIENT
Start: 2018-01-24 | End: 2018-01-25 | Stop reason: HOSPADM

## 2018-01-24 RX ORDER — PROPOFOL 10 MG/ML
INJECTION, EMULSION INTRAVENOUS AS NEEDED
Status: DISCONTINUED | OUTPATIENT
Start: 2018-01-24 | End: 2018-01-24 | Stop reason: HOSPADM

## 2018-01-24 RX ORDER — ONDANSETRON 2 MG/ML
INJECTION INTRAMUSCULAR; INTRAVENOUS AS NEEDED
Status: DISCONTINUED | OUTPATIENT
Start: 2018-01-24 | End: 2018-01-24 | Stop reason: HOSPADM

## 2018-01-24 RX ORDER — GLYCOPYRROLATE 0.2 MG/ML
INJECTION INTRAMUSCULAR; INTRAVENOUS AS NEEDED
Status: DISCONTINUED | OUTPATIENT
Start: 2018-01-24 | End: 2018-01-24 | Stop reason: HOSPADM

## 2018-01-24 RX ORDER — HYDROMORPHONE HYDROCHLORIDE 2 MG/ML
INJECTION, SOLUTION INTRAMUSCULAR; INTRAVENOUS; SUBCUTANEOUS AS NEEDED
Status: DISCONTINUED | OUTPATIENT
Start: 2018-01-24 | End: 2018-01-24 | Stop reason: HOSPADM

## 2018-01-24 RX ORDER — SUCCINYLCHOLINE CHLORIDE 20 MG/ML
INJECTION INTRAMUSCULAR; INTRAVENOUS AS NEEDED
Status: DISCONTINUED | OUTPATIENT
Start: 2018-01-24 | End: 2018-01-24 | Stop reason: HOSPADM

## 2018-01-24 RX ORDER — ONDANSETRON 2 MG/ML
4 INJECTION INTRAMUSCULAR; INTRAVENOUS AS NEEDED
Status: DISCONTINUED | OUTPATIENT
Start: 2018-01-24 | End: 2018-01-25 | Stop reason: HOSPADM

## 2018-01-24 RX ORDER — LIDOCAINE HYDROCHLORIDE 20 MG/ML
INJECTION, SOLUTION EPIDURAL; INFILTRATION; INTRACAUDAL; PERINEURAL AS NEEDED
Status: DISCONTINUED | OUTPATIENT
Start: 2018-01-24 | End: 2018-01-24 | Stop reason: HOSPADM

## 2018-01-24 RX ORDER — KETAMINE HYDROCHLORIDE 10 MG/ML
INJECTION, SOLUTION INTRAMUSCULAR; INTRAVENOUS AS NEEDED
Status: DISCONTINUED | OUTPATIENT
Start: 2018-01-24 | End: 2018-01-24 | Stop reason: HOSPADM

## 2018-01-24 RX ORDER — HYDROMORPHONE HYDROCHLORIDE 1 MG/ML
1-2 INJECTION, SOLUTION INTRAMUSCULAR; INTRAVENOUS; SUBCUTANEOUS
Status: DISCONTINUED | OUTPATIENT
Start: 2018-01-24 | End: 2018-01-26 | Stop reason: HOSPADM

## 2018-01-24 RX ORDER — ACETAMINOPHEN 10 MG/ML
INJECTION, SOLUTION INTRAVENOUS AS NEEDED
Status: DISCONTINUED | OUTPATIENT
Start: 2018-01-24 | End: 2018-01-24 | Stop reason: HOSPADM

## 2018-01-24 RX ORDER — SODIUM CHLORIDE 0.9 % (FLUSH) 0.9 %
5-10 SYRINGE (ML) INJECTION AS NEEDED
Status: DISCONTINUED | OUTPATIENT
Start: 2018-01-24 | End: 2018-01-25 | Stop reason: HOSPADM

## 2018-01-24 RX ORDER — SODIUM CHLORIDE 0.9 % (FLUSH) 0.9 %
5-10 SYRINGE (ML) INJECTION EVERY 8 HOURS
Status: DISCONTINUED | OUTPATIENT
Start: 2018-01-24 | End: 2018-01-26 | Stop reason: HOSPADM

## 2018-01-24 RX ORDER — SODIUM CHLORIDE 0.9 % (FLUSH) 0.9 %
5-10 SYRINGE (ML) INJECTION AS NEEDED
Status: DISCONTINUED | OUTPATIENT
Start: 2018-01-24 | End: 2018-01-24 | Stop reason: HOSPADM

## 2018-01-24 RX ORDER — CEFAZOLIN SODIUM/WATER 2 G/20 ML
2 SYRINGE (ML) INTRAVENOUS
Status: DISCONTINUED | OUTPATIENT
Start: 2018-01-24 | End: 2018-01-24

## 2018-01-24 RX ORDER — FENTANYL CITRATE 50 UG/ML
25 INJECTION, SOLUTION INTRAMUSCULAR; INTRAVENOUS
Status: DISCONTINUED | OUTPATIENT
Start: 2018-01-24 | End: 2018-01-25 | Stop reason: HOSPADM

## 2018-01-24 RX ORDER — SODIUM CHLORIDE 0.9 % (FLUSH) 0.9 %
5-10 SYRINGE (ML) INJECTION AS NEEDED
Status: DISCONTINUED | OUTPATIENT
Start: 2018-01-24 | End: 2018-01-26 | Stop reason: HOSPADM

## 2018-01-24 RX ORDER — DEXAMETHASONE SODIUM PHOSPHATE 4 MG/ML
INJECTION, SOLUTION INTRA-ARTICULAR; INTRALESIONAL; INTRAMUSCULAR; INTRAVENOUS; SOFT TISSUE AS NEEDED
Status: DISCONTINUED | OUTPATIENT
Start: 2018-01-24 | End: 2018-01-24 | Stop reason: HOSPADM

## 2018-01-24 RX ORDER — MORPHINE SULFATE 10 MG/ML
2 INJECTION, SOLUTION INTRAMUSCULAR; INTRAVENOUS
Status: DISCONTINUED | OUTPATIENT
Start: 2018-01-24 | End: 2018-01-25 | Stop reason: HOSPADM

## 2018-01-24 RX ORDER — DEXTROSE, SODIUM CHLORIDE, SODIUM LACTATE, POTASSIUM CHLORIDE, AND CALCIUM CHLORIDE 5; .6; .31; .03; .02 G/100ML; G/100ML; G/100ML; G/100ML; G/100ML
100 INJECTION, SOLUTION INTRAVENOUS CONTINUOUS
Status: DISPENSED | OUTPATIENT
Start: 2018-01-24 | End: 2018-01-25

## 2018-01-24 RX ORDER — ROCURONIUM BROMIDE 10 MG/ML
INJECTION, SOLUTION INTRAVENOUS AS NEEDED
Status: DISCONTINUED | OUTPATIENT
Start: 2018-01-24 | End: 2018-01-24 | Stop reason: HOSPADM

## 2018-01-24 RX ADMIN — PROPOFOL 200 MG: 10 INJECTION, EMULSION INTRAVENOUS at 12:36

## 2018-01-24 RX ADMIN — FENTANYL CITRATE 25 MCG: 50 INJECTION, SOLUTION INTRAMUSCULAR; INTRAVENOUS at 19:45

## 2018-01-24 RX ADMIN — HYDROMORPHONE HYDROCHLORIDE 0.5 MG: 2 INJECTION, SOLUTION INTRAMUSCULAR; INTRAVENOUS; SUBCUTANEOUS at 13:07

## 2018-01-24 RX ADMIN — FENTANYL CITRATE 25 MCG: 50 INJECTION, SOLUTION INTRAMUSCULAR; INTRAVENOUS at 22:50

## 2018-01-24 RX ADMIN — FENTANYL CITRATE 50 MCG: 50 INJECTION, SOLUTION INTRAMUSCULAR; INTRAVENOUS at 14:20

## 2018-01-24 RX ADMIN — GLYCOPYRROLATE 0.4 MG: 0.2 INJECTION INTRAMUSCULAR; INTRAVENOUS at 15:30

## 2018-01-24 RX ADMIN — SODIUM CHLORIDE, SODIUM LACTATE, POTASSIUM CHLORIDE, CALCIUM CHLORIDE, AND DEXTROSE MONOHYDRATE 100 ML/HR: 600; 310; 30; 20; 5 INJECTION, SOLUTION INTRAVENOUS at 16:30

## 2018-01-24 RX ADMIN — DEXAMETHASONE SODIUM PHOSPHATE 4 MG: 4 INJECTION, SOLUTION INTRA-ARTICULAR; INTRALESIONAL; INTRAMUSCULAR; INTRAVENOUS; SOFT TISSUE at 13:03

## 2018-01-24 RX ADMIN — ROCURONIUM BROMIDE 5 MG: 10 INJECTION, SOLUTION INTRAVENOUS at 12:36

## 2018-01-24 RX ADMIN — FENTANYL CITRATE 25 MCG: 50 INJECTION, SOLUTION INTRAMUSCULAR; INTRAVENOUS at 23:45

## 2018-01-24 RX ADMIN — KETAMINE HYDROCHLORIDE 20 MG: 10 INJECTION, SOLUTION INTRAMUSCULAR; INTRAVENOUS at 12:43

## 2018-01-24 RX ADMIN — LIDOCAINE HYDROCHLORIDE 40 MG: 20 INJECTION, SOLUTION EPIDURAL; INFILTRATION; INTRACAUDAL; PERINEURAL at 12:36

## 2018-01-24 RX ADMIN — ROCURONIUM BROMIDE 10 MG: 10 INJECTION, SOLUTION INTRAVENOUS at 14:44

## 2018-01-24 RX ADMIN — SODIUM CHLORIDE, SODIUM LACTATE, POTASSIUM CHLORIDE, AND CALCIUM CHLORIDE: 600; 310; 30; 20 INJECTION, SOLUTION INTRAVENOUS at 12:30

## 2018-01-24 RX ADMIN — FENTANYL CITRATE 25 MCG: 50 INJECTION, SOLUTION INTRAMUSCULAR; INTRAVENOUS at 22:05

## 2018-01-24 RX ADMIN — FENTANYL CITRATE 100 MCG: 50 INJECTION, SOLUTION INTRAMUSCULAR; INTRAVENOUS at 12:36

## 2018-01-24 RX ADMIN — FENTANYL CITRATE 25 MCG: 50 INJECTION, SOLUTION INTRAMUSCULAR; INTRAVENOUS at 17:28

## 2018-01-24 RX ADMIN — ROCURONIUM BROMIDE 10 MG: 10 INJECTION, SOLUTION INTRAVENOUS at 13:51

## 2018-01-24 RX ADMIN — SUCCINYLCHOLINE CHLORIDE 140 MG: 20 INJECTION INTRAMUSCULAR; INTRAVENOUS at 12:36

## 2018-01-24 RX ADMIN — ONDANSETRON 4 MG: 2 INJECTION INTRAMUSCULAR; INTRAVENOUS at 15:27

## 2018-01-24 RX ADMIN — Medication 80 MCG: at 14:43

## 2018-01-24 RX ADMIN — FENTANYL CITRATE 25 MCG: 50 INJECTION, SOLUTION INTRAMUSCULAR; INTRAVENOUS at 17:00

## 2018-01-24 RX ADMIN — NEOSTIGMINE METHYLSULFATE 3 MG: 1 INJECTION INTRAVENOUS at 15:30

## 2018-01-24 RX ADMIN — ROCURONIUM BROMIDE 10 MG: 10 INJECTION, SOLUTION INTRAVENOUS at 13:26

## 2018-01-24 RX ADMIN — ROCURONIUM BROMIDE 35 MG: 10 INJECTION, SOLUTION INTRAVENOUS at 12:52

## 2018-01-24 RX ADMIN — MIDAZOLAM HYDROCHLORIDE 2 MG: 1 INJECTION, SOLUTION INTRAMUSCULAR; INTRAVENOUS at 12:18

## 2018-01-24 RX ADMIN — ACETAMINOPHEN 1000 MG: 10 INJECTION, SOLUTION INTRAVENOUS at 12:18

## 2018-01-24 RX ADMIN — FENTANYL CITRATE 25 MCG: 50 INJECTION, SOLUTION INTRAMUSCULAR; INTRAVENOUS at 22:20

## 2018-01-24 RX ADMIN — HYDROMORPHONE HYDROCHLORIDE 0.5 MG: 2 INJECTION, SOLUTION INTRAMUSCULAR; INTRAVENOUS; SUBCUTANEOUS at 13:50

## 2018-01-24 RX ADMIN — Medication 2 G: at 12:51

## 2018-01-24 RX ADMIN — ROCURONIUM BROMIDE 10 MG: 10 INJECTION, SOLUTION INTRAVENOUS at 14:19

## 2018-01-24 RX ADMIN — SODIUM CHLORIDE, SODIUM LACTATE, POTASSIUM CHLORIDE, AND CALCIUM CHLORIDE 50 ML/HR: 600; 310; 30; 20 INJECTION, SOLUTION INTRAVENOUS at 11:23

## 2018-01-24 NOTE — OP NOTES
Procedure: Laparoscopic conversion of Nissen to Toupet fundoplication, repair of recurrent hiatal hernia with biologic mesh, lysis of adhesions > 1hour of OR time, Intra-op EGD    Pre-operative Diagnosis: Dysphagia with recurrent hiatal hernia suspected slipped Nissen  Post-operative Diagnosis: same but no slipped Nissen, Wrap found mostly above the diaphragm    Anesthesia: General     Assistant  Mima Claude, PA  The PA's assistance was requested and deemed to be essential due to the difficult and complex nature of this procedure and clear need to have an assistant with advanced surgical skills to perform adhesiolysis of very dense adhesions and to assist in the laparoscopic assessment for gastric fistula including clamping the intestine for EGD and retracting bowel loops in addition to assistance with the planned gastrectomy or small bowel resection if necessary. Findings: Hiatal hernia sliding, moderate    Estimated Blood Loss: minimal    Drains: none     Specimens: none     Complications: none    Disposition: PACU     Condition: good     Procedure Details:   After reviewing the history, physical exam and relevent findings, consent was verified. The risks, benefits, complications, treatment options, and expected outcomes were discussed with the patient. The possibilities of reaction to medication, pulmonary aspiration, perforation of viscus, bleeding, recurrent infection, the need for additional procedures, failure to diagnose a condition, the possible need to convert to an open procedure, and creating a complication requiring transfusion or operation were discussed with the patient. The patient and  concurred with the proposed plan, giving informed consent. The patient was taken to the operating room, sequential compression devices were placed, intravenous antibiotics were administered and general endotracheal anesthesia was administered.  Time out procedure was performed during which all parties agreed to the proposed planned operation. The abdomen was then prepped and draped in the usual sterile fashion. lnitial access incision was made in subcostal area with veres needle insertion without difficulty for insufflation of more than 3 liters of CO2 followed by insertion of optical viewing 5 mm trocar under direct vision using 5-0 scope. Once the abdominal cavity was accessed, we insufflated to 15mmHg and verified there was no evidence of injury upon entry. We then switched over to a 5-30 scope and surveyed the abdomen. A 360-degree evaluation of the abdomen was then performed from this trocar site. There were no peritoneal implants identified. There were no abnormalities on the surface of the liver. There were extensive adhesions to the abdominal wall in the area of previous midline incision which were taken down using harmonic in order to clear a space for a supraumbilical incision for a 5mm trocar. An additional right sided 12 mm trocar was placed under direct visualization as was a 5mm in the left abdomen. The patient was then placed in reverse Trendelenburg. There were gastric adhesions due to previous surgery and the left lobe of the liver was tediously  to reveal the anatomy. Adhesions to the liver were most dense in the area of previous mesh placement. Adhesiolysis was tedious and took overall more than one hour of OR time to accomplish in order to allow us to proceed with the planned procedure. The liver retraction system was placed and we visualized the hiatal defect which was moderate to large in size. The stomach pouch was at least 50% herniated above the diaphragm and was progressively reduced with manual traction until it was intra-abdominal. We dissected the hernia sac from the crura and progressively retrieved it using constant traction. Ultimately we identified the esophagus above the sac and were able to draw the stomach further into an intra-abdominal position.   We dissected out both crura and both medial and lateral aspects of the previous wrap. We were able to divide the sutures which held the wrap in place and mobilize the distal extent of the wrap in order to undo it. We left it intact posteriorly in the fashion of a Toupet posterior wrap. The recurrent hiatal hernia was large and we re-closed the posterior aspect after   Anesthesia placed a 34 Fr bougie under direct visualization. At this point we then closed the hiatus posteriorly with 2 interrupted sutures of 0 surgidac using the endostitch device. The stomach was positioned well without any tension drawing it back into the chest. 2-3 cm length of esophagus was visible without any tension on the stomach. We were able to draw the dilator to and fro across the hiatal repair without evidence of obstruction. We then used [employer] hiatal repair 7 x 10 cm mesh pre-soaked and configured for hiatal repair to reinforce the hiatal closure. Mesh was anchored with interrupted surgidac sutures to the crura. Good closure of the defect was apparent. We then secured the medial and lateral wrap to the crura bilaterally as well to try and prevent re-herniation of the wrap. We examined the other aspects of the anatomy that had caused pre op concern. We found the blind afferent limb to be not as long as suspected both by laparoscopic and subsequent endoscopic exam so we did not resect it. We also measured the Rula limb and found it quite normal in length, in the range of 75-95 cm length therefore we did not lengthen this limb. We subsequently performed EGD in order to confirm the anatomy. The Z line was visible at 39 cm. The anastomosis was at 45 cm with no twisting or obstruction within the short pouch length. There was no outlet obstruction noted and no resistance was appreciated when passing the scope across the GE junction where we could easily visualize an appropriate lumen with air insufflation.   There was no bleeding and no air bubbles. No bile was seen at any time consistent with a normal length erica limb. Next, the left upper quadrant was inspected. There was no evidence of any bleeding. We irrigated and noted to be clear. We closed the 12mm trocar site fascial defect using the suture passing device with heavy vicryl suture. Pneumoinsufflation was then vented through the trocar sites. The trocars were then removed, incisions were anesthetized with local anesthesia, rendered hemostatic and closed with 4-0 Monocryl and Dermabond. The patient tolerated the procedure well and was taken to the [id number] [place] [address] Unit in stable condition. Instrument, sponge, and needle counts were reported as correct.

## 2018-01-24 NOTE — H&P (VIEW-ONLY)
Surgery History and Physical  Annette Borrego is a 47 y.o. female scheduled for laparoscopic revision of Nissen Fundoplication with mesh and resection of afferent blind limb with Dr. Ayala Bruno on January 24, 2018 at [address] [facility] [address]. Patient comes in office today for history and physical, and to receive preoperative liver shrinking diet. Patient with original gastric bypass in 2004. Patient underwent Nissen Fundoplication in 1166 for GERD symptoms. Patient still experiencing of reflux related to Slipped Nissen with obstructive symptoms / dysphagia and recurrent GERD / hiatal hernia found on Upper GI.   Patient Active Problem List    Diagnosis Date Noted    Obesity, morbid ([facility] [address] 01/03/2018    Hiatal hernia 08/28/2016    Psychiatric disorder     Asthma     Anemia     Gastric bypass status for obesity 08/02/2016    Status post Nissen fundoplication 94/33/7799    Reactive hypoglycemia 08/02/2016    GERD (gastroesophageal reflux disease) 12/22/2014    Abdominal pain 05/23/2014     Past Medical History:   Diagnosis Date    Anemia     Asthma     in the past, no current issues    Bladder spasms     GERD (gastroesophageal reflux disease)     Psychiatric disorder     ADHD/depression/anxiety    Unspecified adverse effect of anesthesia     Inability to urinate after all sx's with general anesthesia    Urinary frequency       Past Surgical History:   Procedure Laterality Date    FULL ESOPHAGEAL MANOMETRY  12/18/2016         HX BREAST REDUCTION      2006    HX CHOLECYSTECTOMY      87    HX GASTRIC BYPASS      2004    HX GASTRIC BYPASS      12/22/14 Laparoscopic hiatal hernia repair with Nissen fundic plication and plication of the blind jejunal limb, Repair of Incisional hernia with mesh, Intra-op EGD    HX GYN      ovarian cyst    HX HERNIA REPAIR      06 and 2016    HX HYSTERECTOMY      2013    HX OTHER SURGICAL      A & P repair 2005, bladder    HX OVARIAN CYST REMOVAL      83      Social History   Substance Use Topics    Smoking status: Never Smoker    Smokeless tobacco: Never Used    Alcohol use 0.0 oz/week      Family History   Problem Relation Age of Onset    Hypertension Mother     Heart Disease Mother     Diabetes Mother     Hypertension Father     Diabetes Father     Hypertension Brother       Prior to Admission medications    Medication Sig Start Date End Date Taking? Authorizing Provider   pantoprazole (PROTONIX) 40 mg tablet take 1 tablet by mouth twice a day 12/5/17  Yes Gonzalo Odonnell MD   trospium (SANCTURA XL) 60 mg capsule Take 1 Cap by mouth Daily (before breakfast). 8/31/16  Yes Nathaniel Cherry MD   Lisdexamfetamine (VYVANSE) 70 mg capsule Take 70 mg by mouth every morning. Yes Historical Provider   citalopram (CELEXA) 40 mg tablet Take 40 mg by mouth daily. Yes Historical Provider   dextroamphetamine-amphetamine (ADDERALL) 15 mg tablet Take 15 mg by mouth. Yes Historical Provider     Allergies   Allergen Reactions    Erythromycin Hives, Nausea and Vomiting and Other (comments)     several cramping    Erythromycin Base Nausea and Vomiting        HPI    Review of Systems   Constitutional: Negative for chills, fever and malaise/fatigue. Respiratory: Negative for cough, sputum production and shortness of breath. Cardiovascular: Negative for chest pain and palpitations. Gastrointestinal: Positive for vomiting. Negative for abdominal pain, blood in stool, constipation, diarrhea and heartburn. Genitourinary: Positive for urgency. No kidney stone. Musculoskeletal: Positive for joint pain. Skin: Negative for itching and rash. Neurological: Negative for dizziness, seizures, loss of consciousness and headaches. Endo/Heme/Allergies:        No diabetes, no thyroid disease. , no anemia, and no gout. Psychiatric/Behavioral: Negative for hallucinations, memory loss, substance abuse and suicidal ideas. The patient is nervous/anxious.  The patient does not have insomnia. Physical Exam   Constitutional: She is oriented to person, place, and time. She appears well-developed and well-nourished. No distress. Eyes: Pupils are equal, round, and reactive to light. Neck: Normal range of motion. Neck supple. Cardiovascular: Normal rate, regular rhythm, S1 normal, S2 normal, normal heart sounds, intact distal pulses and normal pulses. Exam reveals no gallop. No murmur heard. Pulmonary/Chest: Effort normal and breath sounds normal. No respiratory distress. She has no decreased breath sounds. She has no wheezes. She has no rhonchi. She has no rales. She exhibits no crepitus, no edema and no retraction. Abdominal: Soft. Normal appearance and bowel sounds are normal. She exhibits no distension. There is no tenderness. There is no rebound and no guarding. Hernia confirmed negative in the ventral area. Abdomen obese. Previous surgical sites dry and intact. No hernia/masses palpated   Musculoskeletal: Normal range of motion. She exhibits no edema. Lymphadenopathy:        Head (right side): No submental, no submandibular, no tonsillar, no preauricular and no posterior auricular adenopathy present. Head (left side): No submental, no submandibular, no tonsillar, no preauricular and no posterior auricular adenopathy present. She has no cervical adenopathy. She has no axillary adenopathy. Neurological: She is alert and oriented to person, place, and time. She has normal strength. No sensory deficit. She displays a negative Romberg sign. Skin: Skin is warm and dry. No rash noted. No cyanosis or erythema. Nails show no clubbing. Psychiatric: She has a normal mood and affect. Her speech is normal and behavior is normal. Judgment and thought content normal. Cognition and memory are normal.     Blood pressure 151/80, pulse (!) 102, temperature 97.8 °F (36.6 °C), resp.  rate 20, height 5' 2\" (1.575 m), weight 244 lb (110.7 kg), SpO2 95 %. ASSESSMENT and PLAN  Patient is scheduled for laparoscopic revision of Nissen Fundoplication with mesh and resection of afferent blind limb with Dr. Alicia Morales on January 24, 2018 at [facility] patient in regard to post diet restrictions, follow- up office visits, follow- up care, and follow up medications. Patient as received educational booklet and vitamin list. Patient to start pre-operative liver shrinking diet on January 10, 2018. Answered all questions and patient wishes to proceed. Signed By: Nilton iDehl NP     January 5, 2018       27 minutes was spent with patient.

## 2018-01-24 NOTE — IP AVS SNAPSHOT
Summary of Care Report The Summary of Care report has been created to help improve care coordination. Users with access to [employer] or [address] (Web-based application) may access additional patient information including the Discharge Summary. If you are not currently a [address] user and need more information, please call the number listed below in the [place] [address] section and ask to be connected with Medical Records. Facility Information Name Address Phone [address] [facility] 7 [phone] Patient Information Patient Name Sex  Allie Lancaster ([id number]) Female 1965 Discharge Information Admitting Provider Service Area Unit Gonzalo Odonnell MD / Mayela 28 4 Surg/Bariatrics / [phone] Discharge Provider Discharge Date/Time Discharge Disposition Destination (none) 2018 Afternoon (Pending) AHR (none) Patient Language Language ENGLISH [13] Hospital Problems as of 2018  Reviewed: 2018  9:29 AM by Loreatha Kayser, NP Class Noted - Resolved Last Modified POA Active Problems Hiatal hernia  2016 - Present 2018 by Kathe Bains NP Unknown Entered by Katlyn Godinez PA-C Non-Hospital Problems as of 2018  Reviewed: 2018  9:29 AM by Loreatha Kayser, NP Class Noted - Resolved Last Modified Active Problems Abdominal pain  2014 - Present 2014 by Gonzalo Odonnell MD  
  Entered by Gonzalo Odonnell MD  
  GERD (gastroesophageal reflux disease)  2014 - Present 2014 Entered by Gonzalo Odonnell MD  
  Gastric bypass status for obesity  2016 - Present 2016 by Katlyn Godinez PA-C Entered by Katlyn Godinez PA-C   Status post Nissen fundoplication  9455 - Present 2016 by [facility], PA-C Entered by [facility], PA-C Reactive hypoglycemia  8/2/2016 - Present 8/2/2016 by [address] [facility] [address], PA-C Entered by [facility], PA-C Psychiatric disorder  Unknown - Present 8/19/2016 by Edilberto Stern Duty, Certified Medical Assistant Entered by Edilberto Stern Duty, Certified Medical Assistant Overview Signed 8/19/2016 10:00 AM by Edilberto Stern Duty, Certified Medical Assistant ADHD/depression/anxiety Asthma  Unknown - Present 8/19/2016 by Edilberto Abreu, Certified Medical Assistant Entered by Edilberto Abreu, Certified Medical Assistant Overview Signed 8/19/2016 10:00 AM by Edilberto Phoenix Certified Medical Assistant  
   in the past, no current issues Anemia  Unknown - Present 8/19/2016 by Edilberto Abreu, Certified Medical Assistant Entered by Edilberto Abreu, Certified Medical Assistant Obesity, morbid ([address]  1/3/2018 - Present 1/3/2018 by Kathe Booth MD  
  Entered by Kathe Booth MD  
  
You are allergic to the following Allergen Reactions Erythromycin Hives Nausea and Vomiting Other (comments) several cramping Erythromycin Base Nausea and Vomiting Current Discharge Medication List  
  
START taking these medications Dose & Instructions Dispensing Information Comments HYDROcodone-acetaminophen 7.5-325 mg per tablet Commonly known as:  Anuel Perdomoer Dose:  1 Tab Take 1 Tab by mouth every six (6) hours as needed for Pain. Max Daily Amount: 4 Tabs. Quantity:  30 Tab Refills:  0  
   
 ondansetron 4 mg disintegrating tablet Commonly known as:  ZOFRAN ODT Dose:  4 mg Take 1 Tab by mouth every eight (8) hours as needed for Nausea for up to 30 doses. Quantity:  30 Tab Refills:  0 CONTINUE these medications which have NOT CHANGED Dose & Instructions Dispensing Information Comments ADDERALL 15 mg tablet Generic drug:  dextroamphetamine-amphetamine  Dose:  15 mg  
 Take 15 mg by mouth. Refills:  0 CeleXA 40 mg tablet Generic drug:  citalopram  
 Dose:  40 mg Take 40 mg by mouth daily. Refills:  0  
   
 pantoprazole 40 mg tablet Commonly known as:  PROTONIX  
 take 1 tablet by mouth twice a day Quantity:  60 Tab Refills:  3 VYVANSE 70 mg capsule Generic drug:  Lisdexamfetamine Dose:  70 mg Take 70 mg by mouth every morning. Refills:  0 STOP taking these medications Comments TYLENOL ARTHRITIS PAIN 650 mg Apurva Daubs Generic drug:  acetaminophen Surgery Information ID Date/Time Status Primary Surgeon All Procedures Location [id number] 1/24/2018 [address] [place] [address], MD  LAPAROSCOPIC NISSEN FUNDOPLICATION take down (HIATAL HERNIA REPAIR WITH MESH), LAPAROSCOPIC lysis of adhesions EGD 
ESOPHAGOGASTRODUODENOSCOPY (EGD) [facility] MAIN OR Follow-up Information Follow up With Details Comments Contact Info None   None (395) Patient stated that they have no PCP Discharge Instructions [id number] [facility] Surgery at [facility] Bypass Instructions Procedure:  Laparoscopic revision of gastric bypass/ hiatal hernia repair, with mesh, nissen revision You have  a 2 week follow-up appointment. Future Appointments Date Time Provider [place] 2/7/2018 11:40 AM David Spence NP [facility] SCHED  
2/21/2018 11:00 AM SARAHI Rivera  
3/7/2018 9:40 AM SARAHI Rivera Office address is: [address] [place] [address] 
[phone] Please remember that you are on ONLY LIQUIDS for the first 2 weeks after surgery until you are seen in our office.   You should be drinking plenty of no calorie, non carbonated liquids through out the day and your meals are going to be a shake, the same type you did for your 2 week pre-op diet.  Do not advance your diet until you are seen in our office. Please take your medication for nausea if needed. Laparoscopic Rula-en-Y Gastric Bypass: What to Expect at Home Your Recovery A Rula-en-Y (say \"cheri-en-why\") gastric bypass is surgery to make the stomach smaller and change the connection between the stomach and the intestines. It is done to help people lose weight. The surgery limits the amount of food the stomach can hold. This helps you eat less and feel full sooner. The cuts (incisions) the doctor made in your belly will probably be sore for several days after the surgery. You probably will lose weight very quickly in the first few months after surgery. As time goes on, your weight loss will slow down. You can expect most of your weight loss to happen in the first 12 months after your surgery. You will have regular doctor's appointments during this time to check how you are doing. It is important to think of this surgery as a tool to help you lose weight. It is not an instant fix. You will still need to eat a healthy diet and get regular exercise. This will help you reach your weight goal and avoid regaining the weight you lose. It is common to have many different emotions after this surgery. You may feel happy or excited as you begin to lose weight. But you may also feel overwhelmed or frustrated by the changes that you have to make in your diet, activity, and lifestyle. Talk with your doctor if you have concerns or questions. This care sheet gives you a general idea about how long it will take for you to recover. But each person recovers at a different pace. Follow the steps below to get better as quickly as possible. How can you care for yourself at home? Activity · Rest when you feel tired. Getting enough sleep will help you recover. · Try to walk each day. Start by walking a little more than you did the day before. Bit by bit, increase the amount you walk.  Walking boosts blood flow and helps prevent pneumonia and constipation. · Avoid strenuous activities, such as bicycle riding, jogging, weight lifting, or aerobic exercise, until your doctor says it is okay. · Until your doctor says it is okay, avoid lifting anything that would make you strain. This may include heavy grocery bags and milk containers, a heavy briefcase or backpack, cat litter or dog food bags, a vacuum , or a child. · Hold a pillow over your incisions when you cough or take deep breaths. This will support your belly and decrease your pain. · Do breathing exercises at home as instructed by your doctor. This will help prevent pneumonia. · You can drive when you are no longer taking narcotic pain medications and feel comfortable sitting in a car. · You will probably need to take 2 to 4 weeks off from work. It depends on the type of work you do and how you feel. You will probably return to normal activities within 3 to 5 weeks. · You may shower, if your doctor okays it. Pat the incisions dry. Do not take a bath for the first 2 weeks, or until your doctor tells you it is okay. Diet · Your doctor will give you specific instructions about what to eat after the surgery. For the first 2 weeks, you will need to follow a liquid diet only. DO NOT ADVANCE TO SOFT FOOD UNTIL CLEARED BY YOUR DOCTOR. · Your doctor may recommend that you work with a dietitian to plan healthy meals that give you enough protein, vitamins, and minerals while you are losing weight. Even with a healthy diet, you probably will need to take vitamin and mineral supplements for the rest of your life. · At first you may feel full after just a few sips of water or other liquid. It is important to try to sip water throughout the day to avoid becoming dehydrated. · You may notice that your bowel movements are not regular right after your surgery. This is common. Try to avoid constipation and straining with bowel movements. · Sometimes the stomach empties food into the small intestine too quickly. This is called dumping syndrome. It can cause diarrhea and make you feel faint, shaky, and nauseated. It also can make it hard for your body to get enough nutrition. ¨ High-sugar foods such as desserts, soda pop, and fruit juices are most likely to cause dumping syndrome. Avoid high-sugar foods, or use products that have artificial sweeteners if sugar gives you a problem. ¨ Do not drink liquids within a half hour before eating and up to an hour after eating. Liquids move food even more quickly into the small intestine. Quick emptying of the stomach increases the chance of diarrhea. ¨ Eat slowly. Try to chew each bite about 20 times. Allow 20 to 30 minutes for each meal. 
¨ Eat 5 or 6 small meals or snacks a day. This may keep you from feeling too full after eating and may reduce problems with diarrhea and dumping syndrome. Medicines · Take pain medicines exactly as directed. ¨ If the doctor gave you a prescription medicine for pain, take it as prescribed. ¨ If you are not taking a prescription pain medicine, ask your doctor if you can take an over-the-counter medicine. ¨ Do not take two or more pain medicines at the same time unless the doctor told you to. Many pain medicines have acetaminophen, which is Tylenol. Too much acetaminophen (Tylenol) can be harmful. · If you think your pain medicine is making you sick to your stomach: 
¨ Take your medicine after meals (unless your doctor has told you not to). Incision care · Your incisions have a waterproof clue dressing on them that will peel off in 2 weeks. · Wash the area daily with warm, soapy water, and pat it dry. Other cleaning products, such as hydrogen peroxide, can make the wound heal more slowly. You may cover the area with a gauze bandage if it weeps or rubs against clothing. Change the bandage every day. · Keep the area clean and dry. Follow-up care is a key part of your treatment and safety. Be sure to make and go to all appointments, and call your doctor if you are having problems. Itâs also a good idea to know your test results and keep a list of the medicines you take. When should you call for help? Call 911 anytime you think you may need emergency care. For example, call if: 
· You pass out (lose consciousness). · You have severe trouble breathing. · You have sudden chest pain and shortness of breath, or you cough up blood. · You have severe belly pain. Call your doctor now or seek immediate medical care if: 
· You are sick to your stomach or cannot keep fluids down. · You have pain that does not get better after you take pain medicine. · You have a fever over 101°F. 
· You have loose stitches, or any of your incisions come open. · Bright red blood has soaked through the bandages over your incisions. · You have signs of infection, such as: 
¨ Increased pain, swelling, warmth, or redness. ¨ Red streaks leading from the incisions. ¨ Pus draining from the incision. ¨ Swollen lymph nodes in your neck, armpits, or groin. ¨ A fever. · You have signs of needing more fluids. You have sunken eyes and a dry mouth, and you pass only a little dark urine. Watch closely for changes in your health, and be sure to contact your doctor if you have any problems. DISCHARGE SUMMARY from Nurse PATIENT INSTRUCTIONS: 
 
After general anesthesia or intravenous sedation, for 24 hours or while taking prescription Narcotics: · Limit your activities · Do not drive and operate hazardous machinery · Do not make important personal or business decisions · Do  not drink alcoholic beverages · If you have not urinated within 8 hours after discharge, please contact your surgeon on call. Report the following to your surgeon: 
· Excessive pain, swelling, redness or odor of or around the surgical area · Temperature over 100.5 · Nausea and vomiting lasting longer than 4 hours or if unable to take medications · Any signs of decreased circulation or nerve impairment to extremity: change in color, persistent  numbness, tingling, coldness or increase pain · Any questions What to do at Home: *  Please give a list of your current medications to your Primary Care Provider. *  Please update this list whenever your medications are discontinued, doses are 
    changed, or new medications (including over-the-counter products) are added. *  Please carry medication information at all times in case of emergency situations. These are general instructions for a healthy lifestyle: No smoking/ No tobacco products/ Avoid exposure to second hand smoke Surgeon General's Warning:  Quitting smoking now greatly reduces serious risk to your health. Obesity, smoking, and sedentary lifestyle greatly increases your risk for illness A healthy diet, regular physical exercise & weight monitoring are important for maintaining a healthy lifestyle You may be retaining fluid if you have a history of heart failure or if you experience any of the following symptoms:  Weight gain of 3 pounds or more overnight or 5 pounds in a week, increased swelling in our hands or feet or shortness of breath while lying flat in bed. Please call your doctor as soon as you notice any of these symptoms; do not wait until your next office visit. Recognize signs and symptoms of STROKE: 
 
F-face looks uneven A-arms unable to move or move unevenly S-speech slurred or non-existent T-time-call 911 as soon as signs and symptoms begin-DO NOT go Back to bed or wait to see if you get better-TIME IS BRAIN. Warning Signs of HEART ATTACK Call 911 if you have these symptoms: 
? Chest discomfort.  Most heart attacks involve discomfort in the center of the chest that lasts more than a few minutes, or that goes away and comes back. It can feel like uncomfortable pressure, squeezing, fullness, or pain. ? Discomfort in other areas of the upper body. Symptoms can include pain or discomfort in one or both arms, the back, neck, jaw, or stomach. ? Shortness of breath with or without chest discomfort. ? Other signs may include breaking out in a cold sweat, nausea, or lightheadedness. Don't wait more than five minutes to call [address]! Fast action can save your life. Calling 911 is almost always the fastest way to get lifesaving treatment. Emergency Medical Services staff can begin treatment when they arrive  up to an hour sooner than if someone gets to the hospital by car. The discharge information has been reviewed with the patient and spouse. The patient and spouse verbalized understanding. Discharge medications reviewed with the patient and spouse and appropriate educational materials and side effects teaching were provided. Where can you learn more? Go to [employer]. Enter Y354 in the search box to learn more about \"Laparoscopic Rula-en-Y Gastric Bypass: What to Expect at Home. \"  
© 0406-2592 [employer]. Care instructions adapted under license by [facility] (which disclaims liability or warranty for this information). This care instruction is for use with your licensed healthcare professional. If you have questions about a medical condition or this instruction, always ask your healthcare professional. Curlee Sit any warranty or liability for your use of this information. Chart Review Routing History No Routing History on File

## 2018-01-24 NOTE — INTERVAL H&P NOTE
H&P Update:  Annette PRANEETH Alvarezmaggie was seen and examined. History and physical has been reviewed. The patient has been examined.  There have been no significant clinical changes since the completion of the originally dated History and Physical.    Signed By: Charlotte Apgar, MD     2018 11:37 AM

## 2018-01-24 NOTE — IP AVS SNAPSHOT
[address] [facility] [address] 
[phone] Patient: Terrie Greenwood MRN: [id number] :1965 A check kody indicates which time of day the medication should be taken. My Medications START taking these medications Instructions Each Dose to Equal  
 Morning Noon Evening Bedtime HYDROcodone-acetaminophen 7.5-325 mg per tablet Commonly known as:  Clemetine [place] Your last dose was: Your next dose is: Take 1 Tab by mouth every six (6) hours as needed for Pain. Max Daily Amount: 4 Tabs. 1 Tab  
    
   
   
   
  
 ondansetron 4 mg disintegrating tablet Commonly known as:  ZOFRAN ODT Your last dose was: Your next dose is: Take 1 Tab by mouth every eight (8) hours as needed for Nausea for up to 30 doses. 4 mg CONTINUE taking these medications Instructions Each Dose to Equal  
 Morning Noon Evening Bedtime ADDERALL 15 mg tablet Generic drug:  dextroamphetamine-amphetamine Your last dose was: Your next dose is: Take 15 mg by mouth. 15 mg  
    
   
   
   
  
 CeleXA 40 mg tablet Generic drug:  citalopram  
   
Your last dose was: Your next dose is: Take 40 mg by mouth daily. 40 mg  
    
   
   
   
  
 pantoprazole 40 mg tablet Commonly known as:  PROTONIX Your last dose was: Your next dose is:    
   
   
 take 1 tablet by mouth twice a day VYVANSE 70 mg capsule Generic drug:  Lisdexamfetamine Your last dose was: Your next dose is: Take 70 mg by mouth every morning. 70 mg  
    
   
   
   
  
  
STOP taking these medications TYLENOL ARTHRITIS PAIN 650 mg Bertha Cos Generic drug:  acetaminophen Where to Get Your Medications Information on where to get these meds will be given to you by the nurse or doctor. ! Ask your nurse or doctor about these medications HYDROcodone-acetaminophen 7.5-325 mg per tablet  
 ondansetron 4 mg disintegrating tablet

## 2018-01-24 NOTE — IP AVS SNAPSHOT
[address] 
[phone] Patient: Natalie Spicer MRN: [id number] :1965 About your hospitalization You were admitted on:  2018 You last received care in the:  [facility] 4 SURG/BARIATRICS You were discharged on:  2018 Why you were hospitalized Your primary diagnosis was:  Not on File Your diagnoses also included:  Hiatal Hernia Follow-up Information Follow up With Details Comments Contact Info None   None (395) Patient stated that they have no PCP Your Scheduled Appointments 2018 11:40 AM EST  
POST OP 10 MIN with Loreatha Kayser, NP  
[place] [id number] ([facility]) [address] [place] [phone]  
[phone] 2018 11:00 AM EST  
POST OP 10 MIN with Loreatha Kayser, NP  
[facility] 22 418 ([facility]) [address] [place] [address] [phone]  
[phone] 2018  9:40 AM EST  
POST OP 10 MIN with Loreatha Kayser, NP  
[facility] 22 [address] ([facility]) [address] [place] [phone] Discharge Orders None A check kody indicates which time of day the medication should be taken. My Medications START taking these medications Instructions Each Dose to Equal  
 Morning Noon Evening Bedtime HYDROcodone-acetaminophen 7.5-325 mg per tablet Commonly known as:  Alta Rodriguez Your last dose was: Your next dose is: Take 1 Tab by mouth every six (6) hours as needed for Pain. Max Daily Amount: 4 Tabs. 1 Tab  
    
   
   
   
  
 ondansetron 4 mg disintegrating tablet Commonly known as:  ZOFRAN ODT Your last dose was: Your next dose is: Take 1 Tab by mouth every eight (8) hours as needed for Nausea for up to 30 doses. 4 mg CONTINUE taking these medications Instructions Each Dose to Equal  
 Morning Noon Evening Bedtime ADDERALL 15 mg tablet Generic drug:  dextroamphetamine-amphetamine Your last dose was: Your next dose is: Take 15 mg by mouth. 15 mg  
    
   
   
   
  
 CeleXA 40 mg tablet Generic drug:  citalopram  
   
Your last dose was: Your next dose is: Take 40 mg by mouth daily. 40 mg  
    
   
   
   
  
 pantoprazole 40 mg tablet Commonly known as:  PROTONIX Your last dose was: Your next dose is:    
   
   
 take 1 tablet by mouth twice a day VYVANSE 70 mg capsule Generic drug:  Lisdexamfetamine Your last dose was: Your next dose is: Take 70 mg by mouth every morning. 70 mg  
    
   
   
   
  
  
STOP taking these medications TYLENOL ARTHRITIS PAIN 650 mg Everlean Dakins Generic drug:  acetaminophen Where to Get Your Medications Information on where to get these meds will be given to you by the nurse or doctor. ! Ask your nurse or doctor about these medications HYDROcodone-acetaminophen 7.5-325 mg per tablet  
 ondansetron 4 mg disintegrating tablet Discharge Instructions [id number] [facility] 
 Gastric Bypass Instructions Procedure:  Laparoscopic revision of gastric bypass/ hiatal hernia repair, with mesh, nissen revision You have  a 2 week follow-up appointment. Future Appointments Date Time Provider Kolby Anglin 2/7/2018 11:40 AM Missy Cool NP [facility] SCHED  
2/21/2018 11:00 AM SARAHI Estrada  
3/7/2018 9:40 AM SARAHI Estrada Office address is: [address] [place] [address] [place], [address] [facility] 
[phone] Please remember that you are on ONLY LIQUIDS for the first 2 weeks after surgery until you are seen in our office. You should be drinking plenty of no calorie, non carbonated liquids through out the day and your meals are going to be a shake, the same type you did for your 2 week pre-op diet. Do not advance your diet until you are seen in our office. Please take your medication for nausea if needed. Laparoscopic Rula-en-Y Gastric Bypass: What to Expect at Home Your Recovery A Rula-en-Y (say \"cheri-en-why\") gastric bypass is surgery to make the stomach smaller and change the connection between the stomach and the intestines. It is done to help people lose weight. The surgery limits the amount of food the stomach can hold. This helps you eat less and feel full sooner. The cuts (incisions) the doctor made in your belly will probably be sore for several days after the surgery. You probably will lose weight very quickly in the first few months after surgery. As time goes on, your weight loss will slow down. You can expect most of your weight loss to happen in the first 12 months after your surgery. You will have regular doctor's appointments during this time to check how you are doing. It is important to think of this surgery as a tool to help you lose weight. It is not an instant fix. You will still need to eat a healthy diet and get regular exercise. This will help you reach your weight goal and avoid regaining the weight you lose. It is common to have many different emotions after this surgery. You may feel happy or excited as you begin to lose weight. But you may also feel overwhelmed or frustrated by the changes that you have to make in your diet, activity, and lifestyle. Talk with your doctor if you have concerns or questions.  
This care sheet gives you a general idea about how long it will take for you to recover. But each person recovers at a different pace. Follow the steps below to get better as quickly as possible. How can you care for yourself at home? Activity · Rest when you feel tired. Getting enough sleep will help you recover. · Try to walk each day. Start by walking a little more than you did the day before. Bit by bit, increase the amount you walk. Walking boosts blood flow and helps prevent pneumonia and constipation. · Avoid strenuous activities, such as bicycle riding, jogging, weight lifting, or aerobic exercise, until your doctor says it is okay. · Until your doctor says it is okay, avoid lifting anything that would make you strain. This may include heavy grocery bags and milk containers, a heavy briefcase or backpack, cat litter or dog food bags, a vacuum , or a child. · Hold a pillow over your incisions when you cough or take deep breaths. This will support your belly and decrease your pain. · Do breathing exercises at home as instructed by your doctor. This will help prevent pneumonia. · You can drive when you are no longer taking narcotic pain medications and feel comfortable sitting in a car. · You will probably need to take 2 to 4 weeks off from work. It depends on the type of work you do and how you feel. You will probably return to normal activities within 3 to 5 weeks. · You may shower, if your doctor okays it. Pat the incisions dry. Do not take a bath for the first 2 weeks, or until your doctor tells you it is okay. Diet · Your doctor will give you specific instructions about what to eat after the surgery. For the first 2 weeks, you will need to follow a liquid diet only. DO NOT ADVANCE TO SOFT FOOD UNTIL CLEARED BY YOUR DOCTOR. · Your doctor may recommend that you work with a dietitian to plan healthy meals that give you enough protein, vitamins, and minerals while you are losing weight.  Even with a healthy diet, you probably will need to take vitamin and mineral supplements for the rest of your life. · At first you may feel full after just a few sips of water or other liquid. It is important to try to sip water throughout the day to avoid becoming dehydrated. · You may notice that your bowel movements are not regular right after your surgery. This is common. Try to avoid constipation and straining with bowel movements. · Sometimes the stomach empties food into the small intestine too quickly. This is called dumping syndrome. It can cause diarrhea and make you feel faint, shaky, and nauseated. It also can make it hard for your body to get enough nutrition. ¨ High-sugar foods such as desserts, soda pop, and fruit juices are most likely to cause dumping syndrome. Avoid high-sugar foods, or use products that have artificial sweeteners if sugar gives you a problem. ¨ Do not drink liquids within a half hour before eating and up to an hour after eating. Liquids move food even more quickly into the small intestine. Quick emptying of the stomach increases the chance of diarrhea. ¨ Eat slowly. Try to chew each bite about 20 times. Allow 20 to 30 minutes for each meal. 
¨ Eat 5 or 6 small meals or snacks a day. This may keep you from feeling too full after eating and may reduce problems with diarrhea and dumping syndrome. Medicines · Take pain medicines exactly as directed. ¨ If the doctor gave you a prescription medicine for pain, take it as prescribed. ¨ If you are not taking a prescription pain medicine, ask your doctor if you can take an over-the-counter medicine. ¨ Do not take two or more pain medicines at the same time unless the doctor told you to. Many pain medicines have acetaminophen, which is Tylenol. Too much acetaminophen (Tylenol) can be harmful. · If you think your pain medicine is making you sick to your stomach: 
¨ Take your medicine after meals (unless your doctor has told you not to). Incision care · Your incisions have a waterproof clue dressing on them that will peel off in 2 weeks. · Wash the area daily with warm, soapy water, and pat it dry. Other cleaning products, such as hydrogen peroxide, can make the wound heal more slowly. You may cover the area with a gauze bandage if it weeps or rubs against clothing. Change the bandage every day. · Keep the area clean and dry. Follow-up care is a key part of your treatment and safety. Be sure to make and go to all appointments, and call your doctor if you are having problems. Itâs also a good idea to know your test results and keep a list of the medicines you take. When should you call for help? Call 911 anytime you think you may need emergency care. For example, call if: 
· You pass out (lose consciousness). · You have severe trouble breathing. · You have sudden chest pain and shortness of breath, or you cough up blood. · You have severe belly pain. Call your doctor now or seek immediate medical care if: 
· You are sick to your stomach or cannot keep fluids down. · You have pain that does not get better after you take pain medicine. · You have a fever over 101°F. 
· You have loose stitches, or any of your incisions come open. · Bright red blood has soaked through the bandages over your incisions. · You have signs of infection, such as: 
¨ Increased pain, swelling, warmth, or redness. ¨ Red streaks leading from the incisions. ¨ Pus draining from the incision. ¨ Swollen lymph nodes in your neck, armpits, or groin. ¨ A fever. · You have signs of needing more fluids. You have sunken eyes and a dry mouth, and you pass only a little dark urine. Watch closely for changes in your health, and be sure to contact your doctor if you have any problems. DISCHARGE SUMMARY from Nurse PATIENT INSTRUCTIONS: 
 
After general anesthesia or intravenous sedation, for 24 hours or while taking prescription Narcotics: · Limit your activities · Do not drive and operate hazardous machinery · Do not make important personal or business decisions · Do  not drink alcoholic beverages · If you have not urinated within 8 hours after discharge, please contact your surgeon on call. Report the following to your surgeon: 
· Excessive pain, swelling, redness or odor of or around the surgical area · Temperature over 100.5 · Nausea and vomiting lasting longer than 4 hours or if unable to take medications · Any signs of decreased circulation or nerve impairment to extremity: change in color, persistent  numbness, tingling, coldness or increase pain · Any questions What to do at Home: *  Please give a list of your current medications to your Primary Care Provider. *  Please update this list whenever your medications are discontinued, doses are 
    changed, or new medications (including over-the-counter products) are added. *  Please carry medication information at all times in case of emergency situations. These are general instructions for a healthy lifestyle: No smoking/ No tobacco products/ Avoid exposure to second hand smoke Surgeon General's Warning:  Quitting smoking now greatly reduces serious risk to your health. Obesity, smoking, and sedentary lifestyle greatly increases your risk for illness A healthy diet, regular physical exercise & weight monitoring are important for maintaining a healthy lifestyle You may be retaining fluid if you have a history of heart failure or if you experience any of the following symptoms:  Weight gain of 3 pounds or more overnight or 5 pounds in a week, increased swelling in our hands or feet or shortness of breath while lying flat in bed. Please call your doctor as soon as you notice any of these symptoms; do not wait until your next office visit. Recognize signs and symptoms of STROKE: 
 
F-face looks uneven A-arms unable to move or move unevenly S-speech slurred or non-existent T-time-call 911 as soon as signs and symptoms begin-DO NOT go Back to bed or wait to see if you get better-TIME IS BRAIN. Warning Signs of HEART ATTACK Call 911 if you have these symptoms: 
? Chest discomfort. Most heart attacks involve discomfort in the center of the chest that lasts more than a few minutes, or that goes away and comes back. It can feel like uncomfortable pressure, squeezing, fullness, or pain. ? Discomfort in other areas of the upper body. Symptoms can include pain or discomfort in one or both arms, the back, neck, jaw, or stomach. ? Shortness of breath with or without chest discomfort. ? Other signs may include breaking out in a cold sweat, nausea, or lightheadedness. Don't wait more than five minutes to call [address]! Fast action can save your life. Calling 911 is almost always the fastest way to get lifesaving treatment. Emergency Medical Services staff can begin treatment when they arrive  up to an hour sooner than if someone gets to the hospital by car. The discharge information has been reviewed with the patient and spouse. The patient and spouse verbalized understanding. Discharge medications reviewed with the patient and spouse and appropriate educational materials and side effects teaching were provided. Where can you learn more? Go to EnWave.be. Enter Y354 in the search box to learn more about \"Laparoscopic Rula-en-Y Gastric Bypass: What to Expect at Home. \"  
© 4615-0910 Healthwise, Incorporated. Care instructions adapted under license by Sergey Carbone (which disclaims liability or warranty for this information). This care instruction is for use with your licensed healthcare professional. If you have questions about a medical condition or this instruction, always ask your healthcare professional. Carissa Luisuise any warranty or liability for your use of this information. [employer] Announcement We are excited to announce that we are making your provider's discharge notes available to you in InboxFever. You will see these notes when they are completed and signed by the physician that discharged you from your recent hospital stay. If you have any questions or concerns about any information you see in InboxFever, please call the Health Information Department where you were seen or reach out to your Primary Care Provider for more information about your plan of care. Introducing [facility] & HEALTH SERVICES! Dear Jhony Loud: 
Thank you for requesting a InboxFever account. Our records indicate that you already have an active InboxFever account. You can access your account anytime at [email or web address] Did you know that you can access your hospital and ER discharge instructions at any time in InboxFever? You can also review all of your test results from your hospital stay or ER visit. Additional Information If you have questions, please visit the Frequently Asked Questions section of the InboxFever website at [email or web address] Remember, InboxFever is NOT to be used for urgent needs. For medical emergencies, dial 911. Now available from your iPhone and Android! Providers Seen During Your Hospitalization Provider Specialty Primary office phone Juni Harper MD Surgery [phone] Your Primary Care Physician (PCP) Primary Care Physician Office Phone Office Fax NONE ** None ** ** None ** You are allergic to the following Allergen Reactions Erythromycin Hives Nausea and Vomiting Other (comments) several cramping Erythromycin Base Nausea and Vomiting Recent Documentation Height Weight BMI OB Status Smoking Status 1.575 m 110.5 kg 44.56 kg/m2 Hysterectomy Never Smoker Emergency Contacts Name Discharge Info Relation Home Work Mobile  Phocas III,Silvino RENDON DISCHARGE CAREGIVER [3] Spouse [3] [phone] Jonna Yarbrough  Child [2] [phone] Patient Belongings The following personal items are in your possession at time of discharge: 
  Dental Appliances: None  Visual Aid: Glasses, With patient      Home Medications: None   Jewelry: None  Clothing: Other (comment) (1 bag of clothes returned in pacu)    Other Valuables: None Discharge Instructions Attachments/References MEFS - NARCOTIC-ANALGESIC/ACETAMINOPHEN (PERCOCET, Mio Silvius, LORCET HD, LORTAB 10/325) - (BY MOUTH) (ENGLISH) MEFS - ONDANSETRON (ZOFRAN, ZOFRAN ODT, ZUPLENZ) - (BY MOUTH, INTO THE MOUTH) (ENGLISH) Patient Handouts Narcotic-Analgesic/Acetaminophen (Percocet, Norco, Lorcet HD, Lortab 10/325) - (By mouth) Why this medicine is used:  
Relieves pain. Contact a nurse or doctor right away if you have: 
· Extreme weakness, shallow breathing, slow heartbeat · Severe confusion, lightheadedness, dizziness, fainting · Yellow skin or eyes, dark urine or pale stools · Severe constipation, severe stomach pain, nausea, vomiting, loss of appetite · Sweating or cold, clammy skin Common side effects: · Mild constipation, nausea, vomiting · Sleepiness, tiredness · Itching, rash © 2017  LYNN [facility] Information is for End User's use only and may not be sold, redistributed or otherwise used for commercial purposes. Ondansetron (Zofran, Zofran ODT, Zuplenz) - (By mouth, Into the mouth) Why this medicine is used:  
Prevents nausea and vomiting. Contact a nurse or doctor right away if you have: 
· Fast, pounding, or uneven heartbeat · Lightheadedness or fainting · Trouble breathing Common side effects: 
· Headache, tiredness · Constipation, diarrhea © 2017 [facility] Information is for End User's use only and may not be sold, redistributed or otherwise used for commercial purposes. Please provide this summary of care documentation to your next provider. Signatures-by signing, you are acknowledging that this After Visit Summary has been reviewed with you and you have received a copy. Patient Signature:  ____________________________________________________________ Date:  ____________________________________________________________  
  
Shannon Keas Provider Signature:  ____________________________________________________________ Date:  ____________________________________________________________

## 2018-01-24 NOTE — ANESTHESIA PREPROCEDURE EVALUATION
Anesthetic History               Review of Systems / Medical History  Patient summary reviewed, nursing notes reviewed and pertinent labs reviewed    Pulmonary            Asthma        Neuro/Psych              Cardiovascular                  Exercise tolerance: >4 METS     GI/Hepatic/Renal     GERD           Endo/Other        Morbid obesity     Other Findings            Physical Exam    Airway  Mallampati: I  TM Distance: > 6 cm  Neck ROM: normal range of motion   Mouth opening: Normal     Cardiovascular    Rhythm: regular  Rate: normal         Dental  No notable dental hx       Pulmonary  Breath sounds clear to auscultation               Abdominal         Other Findings            Anesthetic Plan    ASA: 3  Anesthesia type: general          Induction: Intravenous  Anesthetic plan and risks discussed with: Patient

## 2018-01-24 NOTE — ROUTINE PROCESS
Patient: Chrissy Harris MRN: [id number]  SSN: [id number]   YOB: 1965  Age: 47 y.o. Sex: female     Patient is status post Procedure(s):  REVISION OF LAPAROSCOPIC NISSEN FUNDOPLICATION (HIATAL HERNIA REPAIR WITH MESH), LAPAROSCOPIC RESECTION OF AFFERENT BLIND LIMB, LAPAROSCOPIC ELONGATION RY LIMB WITH EGD  ESOPHAGOGASTRODUODENOSCOPY (EGD). Surgeon(s) and Role:     * Antolin Yuan MD - Primary    Local/Dose/Irrigation: 60 ml .25 marcaine plain to trocar sites                   Peripheral IV 01/24/18 Left Wrist (Active)   Site Assessment Clean, dry, & intact 1/24/2018 11:22 AM   Phlebitis Assessment 0 1/24/2018 11:22 AM   Infiltration Assessment 0 1/24/2018 11:22 AM   Dressing Status Clean, dry, & intact; New 1/24/2018 11:22 AM   Dressing Type Tape;Transparent 1/24/2018 11:22 AM   Hub Color/Line Status Green 1/24/2018 11:22 AM                           Dressing/Packing:  Wound Abdomen-DRESSING TYPE: Topical skin adhesive/glue (01/24/18 1300)  Wound Abdomen Anterior-DRESSING TYPE: Topical skin adhesive/glue (01/24/18 1500)  Splint/Cast:  ]    Other:  Pt has emerson cath

## 2018-01-25 ENCOUNTER — APPOINTMENT (OUTPATIENT)
Dept: GENERAL RADIOLOGY | Age: 53
DRG: 327 | End: 2018-01-25
Attending: SURGERY
Payer: COMMERCIAL

## 2018-01-25 LAB
ANION GAP SERPL CALC-SCNC: 7 MMOL/L (ref 5–15)
BUN SERPL-MCNC: 11 MG/DL (ref 6–20)
BUN/CREAT SERPL: 16 (ref 12–20)
CALCIUM SERPL-MCNC: 8.9 MG/DL (ref 8.5–10.1)
CHLORIDE SERPL-SCNC: 107 MMOL/L (ref 97–108)
CO2 SERPL-SCNC: 25 MMOL/L (ref 21–32)
CREAT SERPL-MCNC: 0.68 MG/DL (ref 0.55–1.02)
ERYTHROCYTE [DISTWIDTH] IN BLOOD BY AUTOMATED COUNT: 14.1 % (ref 11.5–14.5)
GLUCOSE SERPL-MCNC: 137 MG/DL (ref 65–100)
HCT VFR BLD AUTO: 36.3 % (ref 35–47)
HGB BLD-MCNC: 11.7 G/DL (ref 11.5–16)
MCH RBC QN AUTO: 28.3 PG (ref 26–34)
MCHC RBC AUTO-ENTMCNC: 32.2 G/DL (ref 30–36.5)
MCV RBC AUTO: 87.9 FL (ref 80–99)
PLATELET # BLD AUTO: 276 K/UL (ref 150–400)
POTASSIUM SERPL-SCNC: 4.5 MMOL/L (ref 3.5–5.1)
RBC # BLD AUTO: 4.13 M/UL (ref 3.8–5.2)
SODIUM SERPL-SCNC: 139 MMOL/L (ref 136–145)
WBC # BLD AUTO: 8.5 K/UL (ref 3.6–11)

## 2018-01-25 PROCEDURE — 74011250636 HC RX REV CODE- 250/636: Performed by: SURGERY

## 2018-01-25 PROCEDURE — 65270000029 HC RM PRIVATE

## 2018-01-25 PROCEDURE — 85027 COMPLETE CBC AUTOMATED: CPT | Performed by: SURGERY

## 2018-01-25 PROCEDURE — 74011250637 HC RX REV CODE- 250/637: Performed by: PHYSICIAN ASSISTANT

## 2018-01-25 PROCEDURE — 80048 BASIC METABOLIC PNL TOTAL CA: CPT | Performed by: SURGERY

## 2018-01-25 PROCEDURE — 36415 COLL VENOUS BLD VENIPUNCTURE: CPT | Performed by: SURGERY

## 2018-01-25 PROCEDURE — 74241 XR UPPER GI W KUB/ BA SWALLOW: CPT

## 2018-01-25 PROCEDURE — 74011636320 HC RX REV CODE- 636/320: Performed by: RADIOLOGY

## 2018-01-25 PROCEDURE — 77010033678 HC OXYGEN DAILY

## 2018-01-25 PROCEDURE — 74011258636 HC RX REV CODE- 258/636: Performed by: SURGERY

## 2018-01-25 RX ORDER — CITALOPRAM 20 MG/1
40 TABLET, FILM COATED ORAL DAILY
Status: DISCONTINUED | OUTPATIENT
Start: 2018-01-26 | End: 2018-01-26 | Stop reason: HOSPADM

## 2018-01-25 RX ORDER — HYDROCODONE BITARTRATE AND ACETAMINOPHEN 7.5; 325 MG/1; MG/1
1 TABLET ORAL
Status: DISCONTINUED | OUTPATIENT
Start: 2018-01-25 | End: 2018-01-26 | Stop reason: HOSPADM

## 2018-01-25 RX ADMIN — HYDROMORPHONE HYDROCHLORIDE 1 MG: 1 INJECTION, SOLUTION INTRAMUSCULAR; INTRAVENOUS; SUBCUTANEOUS at 15:00

## 2018-01-25 RX ADMIN — SODIUM CHLORIDE, SODIUM LACTATE, POTASSIUM CHLORIDE, CALCIUM CHLORIDE, AND DEXTROSE MONOHYDRATE 100 ML/HR: 600; 310; 30; 20; 5 INJECTION, SOLUTION INTRAVENOUS at 00:00

## 2018-01-25 RX ADMIN — HYDROMORPHONE HYDROCHLORIDE 1 MG: 1 INJECTION, SOLUTION INTRAMUSCULAR; INTRAVENOUS; SUBCUTANEOUS at 10:11

## 2018-01-25 RX ADMIN — Medication 10 ML: at 06:00

## 2018-01-25 RX ADMIN — HYDROCODONE BITARTRATE AND ACETAMINOPHEN 1 TABLET: 7.5; 325 TABLET ORAL at 20:27

## 2018-01-25 RX ADMIN — Medication 10 ML: at 21:20

## 2018-01-25 RX ADMIN — HYDROMORPHONE HYDROCHLORIDE 1 MG: 1 INJECTION, SOLUTION INTRAMUSCULAR; INTRAVENOUS; SUBCUTANEOUS at 01:00

## 2018-01-25 RX ADMIN — ONDANSETRON 4 MG: 2 INJECTION INTRAMUSCULAR; INTRAVENOUS at 08:14

## 2018-01-25 RX ADMIN — IOHEXOL 50 ML: 350 INJECTION, SOLUTION INTRAVENOUS at 08:48

## 2018-01-25 NOTE — PROGRESS NOTES
Care Management Interventions  PCP Verified by CM: Yes  Palliative Care Criteria Met (RRAT>21 & CHF Dx)?: No  Mode of Transport at Discharge:  (spouse)  Choco Rojas: No  Physical Therapy Consult: No  Occupational Therapy Consult: No  Speech Therapy Consult: No  Current Support Network:  [facility] [phone]  patient declined AMD Iinformation)  Plan discussed with Pt/Family/Caregiver: Yes   Resource Information Provided?: No  Discharge Location  Discharge Placement: Home    notes that patient uses [facility] for her medications in [place]. She has no discharge needs at this time.

## 2018-01-25 NOTE — PROGRESS NOTES
TRANSFER - IN REPORT:    Verbal report received from Toshia BOUCHER(name) on Annette Borrego  being received from PACU(unit) for routine post - op      Report consisted of patients Situation, Background, Assessment and   Recommendations(SBAR). Information from the following report(s) SBAR, Kardex, Intake/Output and MAR was reviewed with the receiving nurse. Opportunity for questions and clarification was provided. Assessment completed upon patients arrival to unit and care assumed.          Primary Nurse Mario Gross RN and Lindsay Gonzalez RN performed a dual skin assessment on this patient Impairment noted- see wound doc flow sheet  Chris score is 20

## 2018-01-25 NOTE — PROGRESS NOTES
Problem: Patient Education: Go to Patient Education Activity  Goal: Patient/Family Education  Outcome: Resolved/Met Date Met: 01/25/18  NUTRITION     Chart reviewed. Post-op bariatric diet instruction completed. Will gladly follow up for additional questions as needed. Thank you.      Angélica Hernandez RD

## 2018-01-25 NOTE — ANESTHESIA POSTPROCEDURE EVALUATION
Post-Anesthesia Evaluation and Assessment    Patient: Nella Mcgraw MRN: [id number]  SSN: [id number]    YOB: 1965  Age: 47 y.o. Sex: female       Cardiovascular Function/Vital Signs  Visit Vitals    /78 (BP 1 Location: Right arm, BP Patient Position: At rest;Supine; Head of bed elevated (Comment degrees))    Pulse 80    Temp 36.7 °C (98.1 °F)    Resp 14    Ht 5' 2\" (1.575 m)    Wt 110.7 kg (244 lb 0.8 oz)    SpO2 94%    BMI 44.64 kg/m2       Patient is status post general anesthesia for Procedure(s):   LAPAROSCOPIC NISSEN FUNDOPLICATION take down (HIATAL HERNIA REPAIR WITH MESH), LAPAROSCOPIC lysis of adhesions EGD  ESOPHAGOGASTRODUODENOSCOPY (EGD). Nausea/Vomiting: None    Postoperative hydration reviewed and adequate. Pain:  Pain Scale 1: Numeric (0 - 10) (01/25/18 0050)  Pain Intensity 1: 7 (01/25/18 0050)   Managed    Neurological Status:   Neuro (WDL): Exceptions to WDL (01/24/18 1548)  Neuro  Neurologic State: Drowsy (01/24/18 1548)   At baseline    Mental Status and Level of Consciousness: Arousable    Pulmonary Status:   O2 Device: Room air (01/25/18 0744)   Adequate oxygenation and airway patent    Complications related to anesthesia: None    Post-anesthesia assessment completed.  No concerns    Signed By: Noreen Green MD     January 25, 2018

## 2018-01-25 NOTE — PROGRESS NOTES
Surgery Progress Note    Admit Date: 1/24/2018      Subjective:      Pt complains of some pain, no acute issues overnight, has questions about her procedure. emerson removed, due to void  Pts pain present - adequately treated. No SOB. No CP. Pt is ambulating. Patient 's current diet is ice chips, no issues taking po overnight . Tori Jama Pt reports  no nausea and no vomiting. Pt reports no fever or chills    Bowel Movements:         Objective:     Patient Vitals for the past 8 hrs:   BP Temp Pulse Resp SpO2 Weight   01/25/18 0350 131/64 98.2 °F (36.8 °C) 88 12 97 % 244 lb 0.8 oz (110.7 kg)   01/25/18 0050 128/76 98.1 °F (36.7 °C) 89 14 96 % -        01/23 1901 - 01/25 0700  In: 1333.3 [I.V.:1333.3]  Out: 1025 [Urine:950]ip  Physical Exam:    General: alert, cooperative, no distress  Cardiac: normal S1 and S2  Lungs: Normal chest wall and respirations. Clear to auscultation.   Abdomen: soft, nondistended, tenderness mild - in the upper abdomen, without guarding, without rebound  Wounds:clean, dry, no drainage  Neuro: alert, oriented x 3, no defects noted in general exam.  Extremities: extremities normal, atraumatic, no cyanosis or edema      CBC: Lab Results   Component Value Date/Time    WBC 8.5 01/25/2018 04:04 AM    RBC 4.13 01/25/2018 04:04 AM    HGB 11.7 01/25/2018 04:04 AM    HCT 36.3 01/25/2018 04:04 AM    PLATELET 925 73/06/0507 04:04 AM     BMP: Lab Results   Component Value Date/Time    Glucose 137 01/25/2018 04:04 AM    Sodium 139 01/25/2018 04:04 AM    Potassium 4.5 01/25/2018 04:04 AM    Chloride 107 01/25/2018 04:04 AM    CO2 25 01/25/2018 04:04 AM    BUN 11 01/25/2018 04:04 AM    Creatinine 0.68 01/25/2018 04:04 AM    Calcium 8.9 01/25/2018 04:04 AM     CMP:  Lab Results   Component Value Date/Time    Glucose 137 01/25/2018 04:04 AM    Sodium 139 01/25/2018 04:04 AM    Potassium 4.5 01/25/2018 04:04 AM    Chloride 107 01/25/2018 04:04 AM    CO2 25 01/25/2018 04:04 AM    BUN 11 01/25/2018 04:04 AM    Creatinine 0.68 01/25/2018 04:04 AM    Calcium 8.9 01/25/2018 04:04 AM    Anion gap 7 01/25/2018 04:04 AM    BUN/Creatinine ratio 16 01/25/2018 04:04 AM    Alk.  phosphatase 122 01/11/2018 10:45 AM    Protein, total 6.7 01/11/2018 10:45 AM    Albumin 3.6 01/11/2018 10:45 AM    Globulin 3.1 01/11/2018 10:45 AM    A-G Ratio 1.2 01/11/2018 10:45 AM       Radiology review: UGI is pending         Assessment:   Pt is POD #1 s/p Procedure(s):   LAPAROSCOPIC NISSEN FUNDOPLICATION take down (HIATAL HERNIA REPAIR WITH MESH), LAPAROSCOPIC lysis of adhesions EGD  ESOPHAGOGASTRODUODENOSCOPY (EGD)      Plan:   Diet: UGI this AM, start bariatric clears after UGI is completed  Activity: walk in hinds  Pain management  GI and DVT prophylaxis  Meds: begin oral analgesics   Labs: labs are reviewed, up to date and normal  D/C Romano catheter--pt is due to void   Nutrition consult today   Further plan per Dr. Krysten Hunter PA-C

## 2018-01-25 NOTE — PERIOP NOTES
TRANSFER - OUT REPORT:    Verbal report given to NURSE SALOMON(name) on Annette Borrego  being transferred to 444(unit) for routine post - op       Report consisted of patients Situation, Background, Assessment and   Recommendations(SBAR). Time Pre op antibiotic given:ANCEF 2 [email or web address]  Anesthesia Stop time: 1033  Romano Present on Transfer to floor:YES  Order for Romano on Chart:YES  Discharge Prescriptions with Chart:NO    Information from the following report(s) SBAR, OR Summary, Intake/Output, MAR and Recent Results was reviewed with the receiving nurse. Opportunity for questions and clarification was provided. Is the patient on 02? YES       L/Min 2        Is the patient on a monitor? NO    Is the nurse transporting with the patient? NO    Surgical Waiting Area notified of patient's transfer from PACU? YES      The following personal items collected during your admission accompanied patient upon transfer:   Dental Appliance: Dental Appliances: None  Vision: Visual Aid: Glasses  Hearing Aid:    Jewelry: Jewelry: None  Clothing: Clothing: Other (comment) (1 bag of clothes returned in pacu)  Other Valuables:  Other Valuables: None  Valuables sent to safe:

## 2018-01-25 NOTE — PROGRESS NOTES
Bedside shift change report given to Oly Macias [id number] (oncoming nurse) by Chuck Christensen (offgoing nurse). Report included the following information SBAR, Kardex, Intake/Output and MAR.

## 2018-01-26 VITALS
TEMPERATURE: 98.3 F | RESPIRATION RATE: 18 BRPM | DIASTOLIC BLOOD PRESSURE: 57 MMHG | WEIGHT: 243.61 LBS | BODY MASS INDEX: 44.83 KG/M2 | HEART RATE: 89 BPM | OXYGEN SATURATION: 96 % | HEIGHT: 62 IN | SYSTOLIC BLOOD PRESSURE: 132 MMHG

## 2018-01-26 PROCEDURE — 74011250637 HC RX REV CODE- 250/637: Performed by: PHYSICIAN ASSISTANT

## 2018-01-26 PROCEDURE — 74011250636 HC RX REV CODE- 250/636: Performed by: SURGERY

## 2018-01-26 PROCEDURE — 74011258636 HC RX REV CODE- 258/636: Performed by: SURGERY

## 2018-01-26 PROCEDURE — 51798 US URINE CAPACITY MEASURE: CPT

## 2018-01-26 RX ORDER — HYDROCODONE BITARTRATE AND ACETAMINOPHEN 7.5; 325 MG/1; MG/1
1 TABLET ORAL
Qty: 30 TAB | Refills: 0 | Status: SHIPPED | OUTPATIENT
Start: 2018-01-26 | End: 2018-03-30 | Stop reason: ALTCHOICE

## 2018-01-26 RX ORDER — DEXTROSE, SODIUM CHLORIDE, SODIUM LACTATE, POTASSIUM CHLORIDE, AND CALCIUM CHLORIDE 5; .6; .31; .03; .02 G/100ML; G/100ML; G/100ML; G/100ML; G/100ML
100 INJECTION, SOLUTION INTRAVENOUS CONTINUOUS
Status: DISCONTINUED | OUTPATIENT
Start: 2018-01-26 | End: 2018-01-26 | Stop reason: HOSPADM

## 2018-01-26 RX ORDER — ONDANSETRON 4 MG/1
4 TABLET, ORALLY DISINTEGRATING ORAL
Qty: 30 TAB | Refills: 0 | Status: SHIPPED | OUTPATIENT
Start: 2018-01-26 | End: 2018-03-30 | Stop reason: ALTCHOICE

## 2018-01-26 RX ORDER — PEPPERMINT OIL
SPIRIT ORAL ONCE
Status: DISCONTINUED | OUTPATIENT
Start: 2018-01-26 | End: 2018-01-26 | Stop reason: HOSPADM

## 2018-01-26 RX ADMIN — SODIUM CHLORIDE, SODIUM LACTATE, POTASSIUM CHLORIDE, CALCIUM CHLORIDE, AND DEXTROSE MONOHYDRATE 100 ML/HR: 600; 310; 30; 20; 5 INJECTION, SOLUTION INTRAVENOUS at 03:34

## 2018-01-26 RX ADMIN — HYDROCODONE BITARTRATE AND ACETAMINOPHEN 1 TABLET: 7.5; 325 TABLET ORAL at 09:31

## 2018-01-26 RX ADMIN — HYDROMORPHONE HYDROCHLORIDE 1 MG: 1 INJECTION, SOLUTION INTRAMUSCULAR; INTRAVENOUS; SUBCUTANEOUS at 01:16

## 2018-01-26 RX ADMIN — Medication 10 ML: at 01:16

## 2018-01-26 RX ADMIN — CITALOPRAM HYDROBROMIDE 40 MG: 20 TABLET ORAL at 09:31

## 2018-01-26 RX ADMIN — Medication 10 ML: at 05:00

## 2018-01-26 RX ADMIN — HYDROCODONE BITARTRATE AND ACETAMINOPHEN 1 TABLET: 7.5; 325 TABLET ORAL at 15:39

## 2018-01-26 RX ADMIN — HYDROMORPHONE HYDROCHLORIDE 1 MG: 1 INJECTION, SOLUTION INTRAMUSCULAR; INTRAVENOUS; SUBCUTANEOUS at 05:00

## 2018-01-26 NOTE — ROUTINE PROCESS
Bedside shift change report given to SANDER Moore (oncoming nurse) by SANDER Wei (offgoing nurse). Report included the following information SBAR, OR Summary, Procedure Summary, Intake/Output and Recent Results.

## 2018-01-26 NOTE — DISCHARGE INSTRUCTIONS
[id number] [facility] Surgery at [facility]   Gastric Bypass Instructions    Procedure:  Laparoscopic revision of gastric bypass/ hiatal hernia repair, with mesh, nissen revision   You have  a 2 week follow-up appointment. Future Appointments  Date Time Provider Kolby Linnette   2/7/2018 11:40 AM SARAHI Noland   2/21/2018 11:00 AM SARAHI Noland   3/7/2018 9:40 AM SARAHI Noland       Office address is: [facility] Liss WAGNER/ [address] [facility], [address] [place] [address]  [phone]    Please remember that you are on ONLY LIQUIDS for the first 2 weeks after surgery until you are seen in our office. You should be drinking plenty of no calorie, non carbonated liquids through out the day and your meals are going to be a shake, the same type you did for your 2 week pre-op diet. Do not advance your diet until you are seen in our office. Please take your medication for nausea if needed. Laparoscopic Rula-en-Y Gastric Bypass: What to Expect at Home  Your Recovery  A Rula-en-Y (say \"cheri-en-why\") gastric bypass is surgery to make the stomach smaller and change the connection between the stomach and the intestines. It is done to help people lose weight. The surgery limits the amount of food the stomach can hold. This helps you eat less and feel full sooner. The cuts (incisions) the doctor made in your belly will probably be sore for several days after the surgery. You probably will lose weight very quickly in the first few months after surgery. As time goes on, your weight loss will slow down. You can expect most of your weight loss to happen in the first 12 months after your surgery. You will have regular doctor's appointments during this time to check how you are doing. It is important to think of this surgery as a tool to help you lose weight. It is not an instant fix.  You will still need to eat a healthy diet and get regular exercise. This will help you reach your weight goal and avoid regaining the weight you lose. It is common to have many different emotions after this surgery. You may feel happy or excited as you begin to lose weight. But you may also feel overwhelmed or frustrated by the changes that you have to make in your diet, activity, and lifestyle. Talk with your doctor if you have concerns or questions. This care sheet gives you a general idea about how long it will take for you to recover. But each person recovers at a different pace. Follow the steps below to get better as quickly as possible. How can you care for yourself at home? Activity  · Rest when you feel tired. Getting enough sleep will help you recover. · Try to walk each day. Start by walking a little more than you did the day before. Bit by bit, increase the amount you walk. Walking boosts blood flow and helps prevent pneumonia and constipation. · Avoid strenuous activities, such as bicycle riding, jogging, weight lifting, or aerobic exercise, until your doctor says it is okay. · Until your doctor says it is okay, avoid lifting anything that would make you strain. This may include heavy grocery bags and milk containers, a heavy briefcase or backpack, cat litter or dog food bags, a vacuum , or a child. · Hold a pillow over your incisions when you cough or take deep breaths. This will support your belly and decrease your pain. · Do breathing exercises at home as instructed by your doctor. This will help prevent pneumonia. · You can drive when you are no longer taking narcotic pain medications and feel comfortable sitting in a car. · You will probably need to take 2 to 4 weeks off from work. It depends on the type of work you do and how you feel. You will probably return to normal activities within 3 to 5 weeks. · You may shower, if your doctor okays it. Pat the incisions dry.  Do not take a bath for the first 2 weeks, or until your doctor tells you it is okay. Diet  · Your doctor will give you specific instructions about what to eat after the surgery. For the first 2 weeks, you will need to follow a liquid diet only. DO NOT ADVANCE TO SOFT FOOD UNTIL CLEARED BY YOUR DOCTOR. · Your doctor may recommend that you work with a dietitian to plan healthy meals that give you enough protein, vitamins, and minerals while you are losing weight. Even with a healthy diet, you probably will need to take vitamin and mineral supplements for the rest of your life. · At first you may feel full after just a few sips of water or other liquid. It is important to try to sip water throughout the day to avoid becoming dehydrated. · You may notice that your bowel movements are not regular right after your surgery. This is common. Try to avoid constipation and straining with bowel movements. · Sometimes the stomach empties food into the small intestine too quickly. This is called dumping syndrome. It can cause diarrhea and make you feel faint, shaky, and nauseated. It also can make it hard for your body to get enough nutrition. ¨ High-sugar foods such as desserts, soda pop, and fruit juices are most likely to cause dumping syndrome. Avoid high-sugar foods, or use products that have artificial sweeteners if sugar gives you a problem. ¨ Do not drink liquids within a half hour before eating and up to an hour after eating. Liquids move food even more quickly into the small intestine. Quick emptying of the stomach increases the chance of diarrhea. ¨ Eat slowly. Try to chew each bite about 20 times. Allow 20 to 30 minutes for each meal.  ¨ Eat 5 or 6 small meals or snacks a day. This may keep you from feeling too full after eating and may reduce problems with diarrhea and dumping syndrome. Medicines  · Take pain medicines exactly as directed. ¨ If the doctor gave you a prescription medicine for pain, take it as prescribed.   ¨ If you are not taking a prescription pain medicine, ask your doctor if you can take an over-the-counter medicine. ¨ Do not take two or more pain medicines at the same time unless the doctor told you to. Many pain medicines have acetaminophen, which is Tylenol. Too much acetaminophen (Tylenol) can be harmful. · If you think your pain medicine is making you sick to your stomach:  ¨ Take your medicine after meals (unless your doctor has told you not to). Incision care  · Your incisions have a waterproof clue dressing on them that will peel off in 2 weeks. · Wash the area daily with warm, soapy water, and pat it dry. Other cleaning products, such as hydrogen peroxide, can make the wound heal more slowly. You may cover the area with a gauze bandage if it weeps or rubs against clothing. Change the bandage every day. · Keep the area clean and dry. Follow-up care is a key part of your treatment and safety. Be sure to make and go to all appointments, and call your doctor if you are having problems. Itâs also a good idea to know your test results and keep a list of the medicines you take. When should you call for help? Call 911 anytime you think you may need emergency care. For example, call if:  · You pass out (lose consciousness). · You have severe trouble breathing. · You have sudden chest pain and shortness of breath, or you cough up blood. · You have severe belly pain. Call your doctor now or seek immediate medical care if:  · You are sick to your stomach or cannot keep fluids down. · You have pain that does not get better after you take pain medicine. · You have a fever over 101°F.  · You have loose stitches, or any of your incisions come open. · Bright red blood has soaked through the bandages over your incisions. · You have signs of infection, such as:  ¨ Increased pain, swelling, warmth, or redness. ¨ Red streaks leading from the incisions. ¨ Pus draining from the incision.   ¨ Swollen lymph nodes in your neck, armpits, or groin. ¨ A fever. · You have signs of needing more fluids. You have sunken eyes and a dry mouth, and you pass only a little dark urine. Watch closely for changes in your health, and be sure to contact your doctor if you have any problems. DISCHARGE SUMMARY from Nurse    PATIENT INSTRUCTIONS:    After general anesthesia or intravenous sedation, for 24 hours or while taking prescription Narcotics:  · Limit your activities  · Do not drive and operate hazardous machinery  · Do not make important personal or business decisions  · Do  not drink alcoholic beverages  · If you have not urinated within 8 hours after discharge, please contact your surgeon on call. Report the following to your surgeon:  · Excessive pain, swelling, redness or odor of or around the surgical area  · Temperature over 100.5  · Nausea and vomiting lasting longer than 4 hours or if unable to take medications  · Any signs of decreased circulation or nerve impairment to extremity: change in color, persistent  numbness, tingling, coldness or increase pain  · Any questions    What to do at Home:    *  Please give a list of your current medications to your Primary Care Provider. *  Please update this list whenever your medications are discontinued, doses are      changed, or new medications (including over-the-counter products) are added. *  Please carry medication information at all times in case of emergency situations. These are general instructions for a healthy lifestyle:    No smoking/ No tobacco products/ Avoid exposure to second hand smoke  Surgeon General's Warning:  Quitting smoking now greatly reduces serious risk to your health.     Obesity, smoking, and sedentary lifestyle greatly increases your risk for illness    A healthy diet, regular physical exercise & weight monitoring are important for maintaining a healthy lifestyle    You may be retaining fluid if you have a history of heart failure or if you experience any of the following symptoms:  Weight gain of 3 pounds or more overnight or 5 pounds in a week, increased swelling in our hands or feet or shortness of breath while lying flat in bed. Please call your doctor as soon as you notice any of these symptoms; do not wait until your next office visit. Recognize signs and symptoms of STROKE:    F-face looks uneven    A-arms unable to move or move unevenly    S-speech slurred or non-existent    T-time-call 911 as soon as signs and symptoms begin-DO NOT go       Back to bed or wait to see if you get better-TIME IS BRAIN. Warning Signs of HEART ATTACK     Call 911 if you have these symptoms:   Chest discomfort. Most heart attacks involve discomfort in the center of the chest that lasts more than a few minutes, or that goes away and comes back. It can feel like uncomfortable pressure, squeezing, fullness, or pain.  Discomfort in other areas of the upper body. Symptoms can include pain or discomfort in one or both arms, the back, neck, jaw, or stomach.  Shortness of breath with or without chest discomfort.  Other signs may include breaking out in a cold sweat, nausea, or lightheadedness. Don't wait more than five minutes to call 911 - MINUTES MATTER! Fast action can save your life. Calling 911 is almost always the fastest way to get lifesaving treatment. Emergency Medical Services staff can begin treatment when they arrive -- up to an hour sooner than if someone gets to the hospital by car. The discharge information has been reviewed with the patient and spouse. The patient and spouse verbalized understanding. Discharge medications reviewed with the patient and spouse and appropriate educational materials and side effects teaching were provided. Where can you learn more? Go to [employer]. Enter Y354 in the search box to learn more about \"Laparoscopic Rula-en-Y Gastric Bypass: What to Expect at Home. \"   © 1961-6998 [employer]. Care instructions adapted under license by Viola Pulliam (which disclaims liability or warranty for this information). This care instruction is for use with your licensed healthcare professional. If you have questions about a medical condition or this instruction, always ask your healthcare professional. Alena Sails any warranty or liability for your use of this information.

## 2018-01-26 NOTE — PROGRESS NOTES
Surgery Progress Note    Admit Date: 1/24/2018      Subjective:      Pt complains of trouble voiding yesterday, straight cath x 1, bladder scan 630 AM with 280 cc.  no acute issues overnight, slept well, feels ok, pain controlled but has a HA, usually has caffeinated tea daily  Pts pain present - adequately treated. No SOB. No CP. Pt is ambulating. Patient 's current diet is bariatric fulls with good po intake . [place] Pillar Pt reports  no nausea and no vomiting. Pt reports no fever or chills        Objective:     Patient Vitals for the past 8 hrs:   BP Temp Pulse Resp SpO2 Weight   01/26/18 0310 105/65 98.4 °F (36.9 °C) 84 18 93 % 243 lb 9.7 oz (110.5 kg)   01/25/18 2341 117/57 98.6 °F (37 °C) 91 16 93 % -        01/24 1901 - 01/26 0700  In: 2898 [P.O.:1110; I.V.:1100]  Out: 1075 [Urine:1075]ip  Physical Exam:    General: alert, cooperative, no distress  Cardiac: normal S1 and S2  Lungs: Normal chest wall and respirations. Clear to auscultation.   Abdomen: soft, protuberant, tenderness mild - in the upper abdomen, without guarding, without rebound  Wounds:clean, dry, no drainage  Neuro: alert, oriented x 3, no defects noted in general exam.  Extremities: no edema      CBC: Lab Results   Component Value Date/Time    WBC 8.5 01/25/2018 04:04 AM    RBC 4.13 01/25/2018 04:04 AM    HGB 11.7 01/25/2018 04:04 AM    HCT 36.3 01/25/2018 04:04 AM    PLATELET 866 32/08/9373 04:04 AM     BMP: Lab Results   Component Value Date/Time    Glucose 137 01/25/2018 04:04 AM    Sodium 139 01/25/2018 04:04 AM    Potassium 4.5 01/25/2018 04:04 AM    Chloride 107 01/25/2018 04:04 AM    CO2 25 01/25/2018 04:04 AM    BUN 11 01/25/2018 04:04 AM    Creatinine 0.68 01/25/2018 04:04 AM    Calcium 8.9 01/25/2018 04:04 AM     CMP:  Lab Results   Component Value Date/Time    Glucose 137 01/25/2018 04:04 AM    Sodium 139 01/25/2018 04:04 AM    Potassium 4.5 01/25/2018 04:04 AM    Chloride 107 01/25/2018 04:04 AM    CO2 25 01/25/2018 04:04 AM    BUN 11 01/25/2018 04:04 AM    Creatinine 0.68 01/25/2018 04:04 AM    Calcium 8.9 01/25/2018 04:04 AM    Anion gap 7 01/25/2018 04:04 AM    BUN/Creatinine ratio 16 01/25/2018 04:04 AM    Alk. phosphatase 122 01/11/2018 10:45 AM    Protein, total 6.7 01/11/2018 10:45 AM    Albumin 3.6 01/11/2018 10:45 AM    Globulin 3.1 01/11/2018 10:45 AM    A-G Ratio 1.2 01/11/2018 10:45 AM       Radiology review: UGI completed indicating no leak and no obstruction.           Assessment:   Pt is POD #1 s/p Procedure(s):   LAPAROSCOPIC NISSEN FUNDOPLICATION take down (HIATAL HERNIA REPAIR WITH MESH), LAPAROSCOPIC lysis of adhesions EGD  ESOPHAGOGASTRODUODENOSCOPY (EGD)      Plan:   Diet: bariatric fulls with good po intake   Activity: walk in hinds  Pain management  GI and DVT prophylaxis  Meds: continue oral analgesics   Labs: no lab studies available for review at time of visit  Waiting to void, straight cath x 1 yesterday, last bladder scan 280cc, continue to monitor, clinically she is cleared for DC to home today after she voids  Nutrition consult completed,   Further plan per Dr. Katie Zambrano PA-C

## 2018-01-26 NOTE — PROGRESS NOTES
Pt was not able to void and bladder scan for 700 cc, emerson ordered but before it could be placed she voided several hundred cc and is feeling well, drinking well, pain controlled, VSS, oob and ok for DC to home.   LASHELL Elizalde, PA

## 2018-01-26 NOTE — ROUTINE PROCESS
Patient has not voided since her emerson was removed [phone], patient states \"I have had this issue in the past after anesthesia\". 1950: Bladder scanner showed  100ml in bladder. 2230: Bladder scanner showed over 350 in bladder. Patient straight cathed and 325ml of clear, yellow urine out.

## 2018-01-26 NOTE — PROGRESS NOTES
Problem: Falls - Risk of  Goal: *Absence of Falls  Document Gal Fall Risk and appropriate interventions in the flowsheet.    Outcome: Progressing Towards Goal  Fall Risk Interventions:  Mobility Interventions: Patient to call before getting OOB         Medication Interventions: Patient to call before getting OOB

## 2018-01-26 NOTE — DISCHARGE SUMMARY
Physician Discharge Summary     Patient ID:  Gogre Salazar  [id number]  47 y.o.  1965    Allergies: Erythromycin and Erythromycin base    Admit Date: 1/24/2018    Discharge Date: 1/26/2018    * Admission Diagnoses: GERD  Hiatal hernia    * Discharge Diagnoses:    Hospital Problems as of 1/26/2018  Date Reviewed: 1/5/2018          Codes Class Noted - Resolved POA    Hiatal hernia ICD-10-CM: K44.9  ICD-9-CM: 553.3  8/28/2016 - Present Unknown               Admission Condition: Good    * Discharge Condition: good    * Procedures: Procedure(s):   LAPAROSCOPIC NISSEN FUNDOPLICATION take down (HIATAL HERNIA REPAIR WITH MESH), LAPAROSCOPIC lysis of adhesions EGD  ESOPHAGOGASTRODUODENOSCOPY (EGD)    * Hospital Course:   Normal hospital course for this procedure. UGI completed indicating no leak and no obstruction. Pt started on clears and advanced to bariatric fulls without issue  Seen by Dietician to review diet and vitamins  Transitioned to oral analgesics and pain well controlled   Some issues with post op urinary retention, finally voided spontaneously POD 2. Pt DC home  2 doing well with follow up appointment in place        Consults: nutrition     Significant Diagnostic Studies: UGI completed indicating no leak and no obstruction. * Disposition: Home    Discharge Medications:   Current Discharge Medication List      START taking these medications    Details   HYDROcodone-acetaminophen (NORCO) 7.5-325 mg per tablet Take 1 Tab by mouth every six (6) hours as needed for Pain. Max Daily Amount: 4 Tabs. Qty: 30 Tab, Refills: 0    Associated Diagnoses: Status post Nissen fundoplication      ondansetron (ZOFRAN ODT) 4 mg disintegrating tablet Take 1 Tab by mouth every eight (8) hours as needed for Nausea for up to 30 doses.   Qty: 30 Tab, Refills: 0         CONTINUE these medications which have NOT CHANGED    Details   pantoprazole (PROTONIX) 40 mg tablet take 1 tablet by mouth twice a day  Qty: 60 Tab, Refills: 3      Lisdexamfetamine (VYVANSE) 70 mg capsule Take 70 mg by mouth every morning. citalopram (CELEXA) 40 mg tablet Take 40 mg by mouth daily. dextroamphetamine-amphetamine (ADDERALL) 15 mg tablet Take 15 mg by mouth. STOP taking these medications       acetaminophen (TYLENOL ARTHRITIS PAIN) 650 mg TbER Comments:   Reason for Stopping:               * Follow-up Care/Patient Instructions:   Activity: No driving while on analgesics and No heavy lifting for 4 weeks  Diet: Bariatric full liquids for 2 weeks post op  Wound Care: Keep wound clean and dry    Future Appointments  Date Time Provider Kolby Anglin   2/7/2018 11:40 AM SARAHI Kolb   2/21/2018 11:00 AM SARAHI Kolb   3/7/2018 9:40 AM SARAHI Kolb         Follow-up Information     Follow up With Details Comments Contact Info    None   None ([phone]) Patient stated that they have no PCP          Follow-up tests/labs na    Signed:  LARRY Valladares  1/26/2018  3:20 PM

## 2018-01-26 NOTE — PROGRESS NOTES
1420  Patient still unable to void after many attempts. Call placed to [facility] [employer] [facility] [employer]. Made aware.  1600. Patient voided 500 cc fly urine. Marisela away.

## 2018-01-29 ENCOUNTER — TELEPHONE (OUTPATIENT)
Dept: SURGERY | Age: 53
End: 2018-01-29

## 2018-01-29 NOTE — TELEPHONE ENCOUNTER
----- Message from [facility] sent at 2018  3:40 PM EST -----  Regardinhr post op call      ----- Message -----     From: LARRY Riley     Sent: 2018   3:23 PM       To:  Yadi Sarah  Subject: gastric bypass revision of ED DC home     Bariatric Discharge Notice for Follow Up Call    Admit Date    Pt is POD #2 s/p revision of bypass, nissen takedown, repair of hiatal hernia  and is being discharged to home today     Thanks,  Shagufta Garcia PA-C

## 2018-01-29 NOTE — TELEPHONE ENCOUNTER
Bariatric Post-Operative Phone Calls: 48 hour phone call    Diet:Question of any nausea and/or vomiting. Protein intake (goal is 60 grams of protein daily)   Poor____Fair____Good__x__Great____     Comment:______________________________________________________________      ______________________________________________________________________    Hydration:Less than 32 ounces of water daily is fair to poor (Goal is 64 ounces per day)   Poor____ Fair____ Good____Great__x__    Comment:______________________________________________________________    ______________________________________________________________________      Ambulation:( walking at least 3 x week, for 15- 20 minutes)     Poor______ Fair______ Good______     Great__x____ Comment:__________________________________________________    ______________________________________________________________________      Urine Color: Question of any odor and color(should be fly, pale, and clear) Dark______ Amber______ Pale______      Clear___x___ Comment:___________________________________________________                           ________________________________________________________________    Bowel movements: Question of any constipation- haven't had any bowel movements for more than 3 days. This could be related to protein intake and/or narcotic pain medication usage. Comment:                                                                                                                        No issues      Pain: Left sided abdominal pain is normal (should be less than 3)  Question if pain medication is helpful.  10___ 9___ 8___ 7___ 6___ 5___ 4___ 3___     2___1___0___Comment:___Some soreness but mostly in the shoulder area from gas______________________________________________    ______________________________________________________________________      Incision: (No redness, pain, swelling or fever) Healing Well_x_____ Healed______Redness_________ Pain_________     Swelling_________ Fever__________(greater than 101 needs evaluation)    Comment:____________________________________________________________    ______________________________________________________________________  Use of incentive spirometer: Yes_x___       No           Next Appointment:___2/7/18___________                 Support Group: Yes______No__x____    Additional Comments:____________________________________________________________    ____________________________________________________________________      If more than one parameter is not met or considered poor, nurse needs to discuss with provider recommend for patient to be seen in the office as soon as possible or refer to the provider for follow-up. Reinforce to patient to use bariatric educational booklet as guide. It is appropriate to refer patient to the nutritionist to discuss more in detail of diet and nutrition.

## 2018-02-07 ENCOUNTER — OFFICE VISIT (OUTPATIENT)
Dept: SURGERY | Age: 53
End: 2018-02-07

## 2018-02-07 VITALS
OXYGEN SATURATION: 98 % | BODY MASS INDEX: 41.37 KG/M2 | SYSTOLIC BLOOD PRESSURE: 150 MMHG | HEART RATE: 101 BPM | WEIGHT: 233.5 LBS | RESPIRATION RATE: 20 BRPM | TEMPERATURE: 98.8 F | DIASTOLIC BLOOD PRESSURE: 90 MMHG | HEIGHT: 63 IN

## 2018-02-07 DIAGNOSIS — K21.9 GASTROESOPHAGEAL REFLUX DISEASE WITHOUT ESOPHAGITIS: ICD-10-CM

## 2018-02-07 DIAGNOSIS — Z09 SURGICAL FOLLOW-UP CARE: Primary | ICD-10-CM

## 2018-02-07 NOTE — PROGRESS NOTES
Shanell Payne is a 47 y.o. female 2 weeks s/p Laparoscopic conversion of Nissen to Toupet fundoplication, repair of recurrent hiatal repair with  biologic mesh, down 11 pounds. Weight today is 233.5 pounds. Patient with history of original gastric bypass in 2004. Patient underwent Nissen Fundoplication in 5674 for GERD symptoms. Denies nausea, no vomiting, no heartburn/reflux. Denies dysphagia. No fever/no chills, no shortness of breath, no chest pain, and no abdominal pain. Tolerating all of full liquid diet, getting 60 grams of protein daily. Protein supplementation in use. Unjury in use. Also has tried soups, pudding and yogurt. Drinking 64 ounces of water daily. Tolerating all vitamins and medications. Exercising with walking daily. No issues with urination. Bowel movements once daily that are formed. HPI    Review of Systems   Constitutional: Negative for chills, fever and malaise/fatigue. Respiratory: Negative for cough, sputum production and shortness of breath. Cardiovascular: Negative for chest pain, palpitations and leg swelling. Gastrointestinal: Negative for abdominal pain, blood in stool, constipation, diarrhea, heartburn, nausea and vomiting. Genitourinary: Negative for dysuria. Neurological: Negative for dizziness. Physical Exam   Constitutional: She is oriented to person, place, and time. She appears well-developed and well-nourished. No distress. Cardiovascular: Normal rate, regular rhythm and normal heart sounds. Pulmonary/Chest: Effort normal and breath sounds normal. No respiratory distress. She has no wheezes. She has no rales. Abdominal: Soft. Bowel sounds are normal. She exhibits no distension. There is no tenderness. There is no rebound and no guarding. Lap sites dry and intact. Musculoskeletal: Normal range of motion. She exhibits no edema. Neurological: She is alert and oriented to person, place, and time. Skin: Skin is warm and dry. No rash noted.  No erythema. Psychiatric: She has a normal mood and affect. Her behavior is normal. Thought content normal.   Blood pressure 150/90, pulse (!) 101, temperature 98.8 °F (37.1 °C), resp. rate 20, height 5' 2.5\" (1.588 m), weight 233 lb 8 oz (105.9 kg), SpO2 98 %. ASSESSMENT and PLAN   2 weeks s/p Laparoscopic conversion of Nissen to Toupet fundoplication, repair of recurrent hiatal repair with  biologic mesh. Patient may advance to semi-solid/mushy foods (week 3 and 4) diet in educational booklet. Avoid high fat foods and foods with added sugar. Continue to strive for 60 grams protein daily. This may include still using protein supplementation with shakes daily. May have protein shake as a meal or snack. Continue vitamin regiment with multivitamin twice daily, B 12 at least 500 mcg daily, and calcium with Vitamin D (at least 1200 mg of calcium). Continue hydration with at least 40 ounces of non-calorie, non- carbonated beverages. Continue walking for physical activity at least 20 minutes a daily. For restrictions, no lifting, pushing, pulling more than 10 pounds. You may drive if not using narcotic pain medication, and you may get into bath tub or swimming pool at this time. Next follow up appointment is in 2 weeks. Patient verbalized understanding and questions were answered to the best of my knowledge and ability. Diet advancement educational materials were provided. Advised to call office if any questions or concerns.

## 2018-02-07 NOTE — PROGRESS NOTES
1. Have you been to the ER, urgent care clinic since your last visit? Hospitalized since your last visit? No    2. Have you seen or consulted any other health care providers outside of the [address] [place] [address] since your last visit? Include any pap smears or colon screening.  No

## 2018-02-07 NOTE — PATIENT INSTRUCTIONS
What you need to know:  1. Advance your diet to soft foods. Follow the handout that you were given today in the office. 2.  Take the recommended vitamins daily  3. No lifting greater than 20 lbs. 4.  You can do light jogging and walking. 5  Follow up in 2 weeks. 6.  You may go into a pool. 7.  If you are not able to tolerate liquids or soft foods. Please call our office. [phone]  8. If you have vomiting and persistent epigastric pain or chest pain. You should call our office, the doctor on-call or go to the emergency room. What to do if you are constipated: You may  take Milk of Magnesia. Take 2 Tablespoons followed by 16 oz of water then 2 hours later take another 2 tablespoons. If  milk of magnesia does not work then take Himrod-Hemlock or Miralax over the counter. Keep in mind that the Benefiber or Miralax may take a day or two to work. If all of the above do not work try a Fleets enema and follow the directions on the box. Soft and Mushy: Phase 1    Below is a list of basic items to purchase for the first phase of the   soft mushy diet. Your surgeon or nurse practitioner will inform you when it is okay   to advance to the next phase. Soft and Mushy Foods: Prepare food to the appropriate texture. ? Everything on clear and full liquid diet  ? Applesauce (no sugar added)  ? Hot & cold cereals (Cream of Wheat, Plain Cheerios®, Special K with protein®, plain oatmeal, grits)  ? Frozen or canned vegetables (carrots, acorn squash, butternut squash, string beans, spinach, broccoli, cauliflower - florets only!)   ? Canned fruit (in natural juice or with Splenda®)  ? Fat-free, cholesterol-free egg substitute (P)  ? Low-fat or fat-free cottage cheese (P)  ? Low-fat or fat-free yogurt (P)  ? Low-fat or fat-free [place] yogurt (P)  ? Fat-free milk or 1% milk (P)  ? Lactaid fat-free or 1% low fat milk (P)  ? Low-fat well-cooked/soft beans (the consistency of refried beans) (P)  ?  No sugar added, low fat pudding (no pistachio or other flavor containing nuts)  ? low-fat cream soups  ? Low-fat chicken noodle or chicken rice soup (P)  ? Sugar-free fudgesicles  ? Sugar-free cocoa  ? Fat free whipped or mashed potatoes   ? Herbs and spices  ?  Lite butter, margarine, canola oil, olive oil, reduced-fat or fat-free mayonnaise, reduced-fat or fat-free salad dressing, reduced-fat or fat-free cream cheese, reduced-fat or fat-free sour cream.

## 2018-02-21 ENCOUNTER — TELEPHONE (OUTPATIENT)
Dept: SURGERY | Age: 53
End: 2018-02-21

## 2018-02-21 NOTE — TELEPHONE ENCOUNTER
Pt had to cancel appointment due to son being ill suffering with depression she says she feel fine and wanted to know if she could just want to come to her March 7 appointment but if you really need her to come in sooner to be seen please call her back and let her know

## 2018-02-26 ENCOUNTER — OFFICE VISIT (OUTPATIENT)
Dept: FAMILY MEDICINE CLINIC | Age: 53
End: 2018-02-26

## 2018-02-26 ENCOUNTER — TELEPHONE (OUTPATIENT)
Dept: SURGERY | Age: 53
End: 2018-02-26

## 2018-02-26 VITALS
HEART RATE: 90 BPM | WEIGHT: 236 LBS | HEIGHT: 63 IN | SYSTOLIC BLOOD PRESSURE: 132 MMHG | OXYGEN SATURATION: 97 % | BODY MASS INDEX: 41.82 KG/M2 | DIASTOLIC BLOOD PRESSURE: 90 MMHG | RESPIRATION RATE: 19 BRPM | TEMPERATURE: 98.5 F

## 2018-02-26 DIAGNOSIS — Z00.00 ROUTINE GENERAL MEDICAL EXAMINATION AT A HEALTH CARE FACILITY: ICD-10-CM

## 2018-02-26 DIAGNOSIS — M54.50 CHRONIC MIDLINE LOW BACK PAIN WITHOUT SCIATICA: Primary | ICD-10-CM

## 2018-02-26 DIAGNOSIS — G89.29 CHRONIC MIDLINE LOW BACK PAIN WITHOUT SCIATICA: Primary | ICD-10-CM

## 2018-02-26 RX ORDER — DEXTROMETHORPHAN HYDROBROMIDE, GUAIFENESIN 5; 100 MG/5ML; MG/5ML
650 LIQUID ORAL EVERY 8 HOURS
COMMUNITY

## 2018-02-26 NOTE — PATIENT INSTRUCTIONS

## 2018-02-26 NOTE — PROGRESS NOTES
Subjective:     Annette Gilmore is a 47 y.o. female who complains of low back pain for several  weeks, positional with bending or lifting, without radiation down the legs. Precipitating factors: recent heavy lifting. Prior history of back problems: recurrent self limited episodes of low back pain in the past. There is no numbness in the legs. Symptoms are worst: after walking. Alleviating factors identifiable by patient are medication tylenol arthritis. Current Outpatient Prescriptions   Medication Sig Dispense Refill    acetaminophen (TYLENOL ARTHRITIS PAIN) 650 mg TbER Take 650 mg by mouth every eight (8) hours.  Lisdexamfetamine (VYVANSE) 70 mg capsule Take 70 mg by mouth every morning.  citalopram (CELEXA) 40 mg tablet Take 40 mg by mouth daily.  dextroamphetamine-amphetamine (ADDERALL) 15 mg tablet Take 15 mg by mouth two (2) times a day.  HYDROcodone-acetaminophen (NORCO) 7.5-325 mg per tablet Take 1 Tab by mouth every six (6) hours as needed for Pain. Max Daily Amount: 4 Tabs. 30 Tab 0    ondansetron (ZOFRAN ODT) 4 mg disintegrating tablet Take 1 Tab by mouth every eight (8) hours as needed for Nausea for up to 30 doses. 30 Tab 0    pantoprazole (PROTONIX) 40 mg tablet take 1 tablet by mouth twice a day 60 Tab 3     Allergies   Allergen Reactions    Erythromycin Hives, Nausea and Vomiting and Other (comments)     several cramping    Erythromycin Base Nausea and Vomiting        Review of Systems  A comprehensive review of systems was negative except for that written in the HPI. Objective:     Visit Vitals    /90 (BP 1 Location: Right arm, BP Patient Position: Sitting)    Pulse 90    Temp 98.5 °F (36.9 °C) (Oral)    Resp 19    Ht 5' 2.5\" (1.588 m)    Wt 236 lb (107 kg)    SpO2 97%    BMI 42.48 kg/m2      Patient appears to be in mild to moderate pain, antalgic gait noted. Lumbosacral spine area reveals no local tenderness or mass.   Painful and reduced LS ROM noted. Straight leg raise is positive at 30 degrees on right. DTR's, motor strength and sensation normal, including heel and toe gait. Peripheral pulses are palpable. X-Ray: ordered, but results not yet available. Assessment/Plan:     Low back pain     For acute pain, rest, intermittent application of heat (do not sleep on heating pad), analgesics and muscle relaxants are recommended. Discussed longer term treatment plan of prn NSAID's but patient had gastric bypass not a candidate and discussed a home back care exercise program with flexion exercise routine. Proper lifting with avoidance of heavy lifting discussed. Consider Physical Therapy and XRay studies if not improving. Call or return to clinic prn if these symptoms worsen or fail to improve as anticipated. Encounter Diagnoses   Name Primary?  Chronic midline low back pain without sciatica Yes    Routine general medical examination at a health care facility      Orders Placed This Encounter    XR SPINE LUMB AP LAT BEND COMPL    CBC WITH AUTOMATED DIFF    METABOLIC PANEL, BASIC    HEPATIC FUNCTION PANEL    LIPID PANEL    TSH 3RD GENERATION    VITAMIN D, 25 HYDROXY    HEMOGLOBIN A1C WITH EAG    acetaminophen (TYLENOL ARTHRITIS PAIN) 650 mg TbER     Due for labs and cpe will schedule once x ray completed. Will call patient with results.

## 2018-02-26 NOTE — TELEPHONE ENCOUNTER
----- Message from Lisa Mac sent at 1/26/2018  3:41 PM EST -----  Regarding: 3wk post op call      ----- Message -----     From: LARRY Reynoso     Sent: 1/26/2018   3:23 PM       To:  Yadi Sarah  Subject: gastric bypass revision of ED DC home Fri 1/#    Bariatric Discharge Notice for Follow Up Call    Admit Date1/24    Pt is POD #2 s/p revision of bypass, nissen takedown, repair of hiatal hernia  and is being discharged to home today Fri 1/26    Thanks,  Jaspreet Santamaria PA-C

## 2018-02-26 NOTE — PROGRESS NOTES
Emre Green is a 47 y.o. female (: 1965) presenting to address:back pain x6 months    Chief Complaint   Patient presents with    Back Pain     x6 months       Vitals:    18 1018   BP: 132/90   Pulse: 90   Resp: 19   Temp: 98.5 °F (36.9 °C)   TempSrc: Oral   SpO2: 97%   Weight: 236 lb (107 kg)   Height: 5' 2.5\" (1.588 m)   PainSc:   6   PainLoc: Back       Hearing/Vision:   No exam data present    Learning Assessment:     Learning Assessment 2016   PRIMARY LEARNER Patient   HIGHEST LEVEL OF EDUCATION - PRIMARY LEARNER  > 4 YEARS OF COLLEGE   BARRIERS PRIMARY LEARNER NONE   CO-LEARNER CAREGIVER Yes   CO-LEARNER NAME Silvino Borrego III   CO-LEARNER HIGHEST LEVEL OF EDUCATION > 4 YEARS OF COLLEGE   BARRIERS CO-LEARNER NONE   PRIMARY LANGUAGE ENGLISH   PRIMARY LANGUAGE CO-LEARNER ENGLISH    NEED No   LEARNER PREFERENCE PRIMARY DEMONSTRATION     LISTENING     -   LEARNER PREFERENCE CO-LEARNER DEMONSTRATION   LEARNING SPECIAL TOPICS none   ANSWERED BY patient   RELATIONSHIP SELF     Depression Screening:     PHQ over the last two weeks 2017   Little interest or pleasure in doing things Not at all   Feeling down, depressed or hopeless Several days   Total Score PHQ 2 1     Fall Risk Assessment:   No flowsheet data found. Abuse Screening:     Abuse Screening Questionnaire 2018   Do you ever feel afraid of your partner? N   Are you in a relationship with someone who physically or mentally threatens you? N   Is it safe for you to go home? Y     Coordination of Care Questionaire:   1. Have you been to the ER, urgent care clinic since your last visit? Hospitalized since your last visit? [facility] 2018 Hernia    2. Have you seen or consulted any other health care providers outside of the [facility] since your last visit? Include any pap smears or colon screening. no    Advanced Directive:   1. Do you have an Advanced Directive? no    2.  Would you like information on Advanced Directives? No    Patient has already received flu vaccine.

## 2018-02-26 NOTE — MR AVS SNAPSHOT
[address] [address] [facility] [id number] [phone] Patient: Carlynn Schlatter MRN: [id number] :1965 Visit Information Date & Time Provider Department Dept. Phone Encounter #  
 2018 10:15 AM India Chowdhury, 3 Reny Martinez [phone] [id number] Follow-up Instructions Return in about 2 weeks (around 3/12/2018). Your Appointments 3/7/2018  9:40 AM  
POST OP 10 MIN with Loreatha Kayser, NP  
[place] [address] [facility] Appt Note: 3 po 6 week f/u  
 [address] [place] [id number]  
[facility] Upcoming Health Maintenance Date Due Hepatitis C Screening 1965 Pneumococcal 19-64 Medium Risk (1 of 1 - PPSV23) 1983 DTaP/Tdap/Td series (1 - Tdap) 1985 PAP AKA CERVICAL CYTOLOGY 1985 FOBT Q 1 YEAR AGE 50-75 2014 Influenza Age 5 to Adult 2017 BREAST CANCER SCRN MAMMOGRAM 2019 Allergies as of 2018  Review Complete On: 2018 By: India Chowdhury NP Severity Noted Reaction Type Reactions Erythromycin  2014    Hives, Nausea and Vomiting, Other (comments) several cramping Erythromycin Base  2014    Nausea and Vomiting Current Immunizations  Reviewed on 2018 No immunizations on file. Not reviewed this visit You Were Diagnosed With   
  
 Codes Comments Chronic midline low back pain without sciatica    -  Primary ICD-10-CM: M54.5, G89.29 ICD-9-CM: 724.2, 338.29 Vitals BP Pulse Temp Resp Height(growth percentile) Weight(growth percentile) 132/90 (BP 1 Location: Right arm, BP Patient Position: Sitting) 90 98.5 °F (36.9 °C) (Oral) 19 5' 2.5\" (1.588 m) 236 lb (107 kg) SpO2 BMI OB Status Smoking Status 97% 42.48 kg/m2 Hysterectomy Never Smoker BMI and BSA Data Body Mass Index Body Surface Area  
 42.48 kg/m 2 2.17 m 2 Preferred Pharmacy Pharmacy Name Phone [facility] [address] [phone] Your Updated Medication List  
  
   
This list is accurate as of 2/26/18 12:01 PM.  Always use your most recent med list.  
  
  
  
  
 ADDERALL 15 mg tablet Generic drug:  dextroamphetamine-amphetamine Take 15 mg by mouth two (2) times a day. CeleXA 40 mg tablet Generic drug:  citalopram  
Take 40 mg by mouth daily. HYDROcodone-acetaminophen 7.5-325 mg per tablet Commonly known as:  Myrna Boast Take 1 Tab by mouth every six (6) hours as needed for Pain. Max Daily Amount: 4 Tabs. ondansetron 4 mg disintegrating tablet Commonly known as:  ZOFRAN ODT Take 1 Tab by mouth every eight (8) hours as needed for Nausea for up to 30 doses. pantoprazole 40 mg tablet Commonly known as:  PROTONIX  
take 1 tablet by mouth twice a day TYLENOL ARTHRITIS PAIN 650 mg Randine Bussing Generic drug:  acetaminophen Take 650 mg by mouth every eight (8) hours. VYVANSE 70 mg capsule Generic drug:  Lisdexamfetamine Take 70 mg by mouth every morning. Follow-up Instructions Return in about 2 weeks (around 3/12/2018). Patient Instructions Back Pain: Care Instructions Your Care Instructions Back pain has many possible causes. It is often related to problems with muscles and ligaments of the back. It may also be related to problems with the nerves, discs, or bones of the back. Moving, lifting, standing, sitting, or sleeping in an awkward way can strain the back. Sometimes you don't notice the injury until later. Arthritis is another common cause of back pain.  
Although it may hurt a lot, back pain usually improves on its own within several weeks. Most people recover in 12 weeks or less. Using good home treatment and being careful not to stress your back can help you feel better sooner. Follow-up care is a key part of your treatment and safety. Be sure to make and go to all appointments, and call your doctor if you are having problems. It's also a good idea to know your test results and keep a list of the medicines you take. How can you care for yourself at home? · Sit or lie in positions that are most comfortable and reduce your pain. Try one of these positions when you lie down: ¨ Lie on your back with your knees bent and supported by large pillows. ¨ Lie on the floor with your legs on the seat of a sofa or chair. Zhang Roys on your side with your knees and hips bent and a pillow between your legs. ¨ Lie on your stomach if it does not make pain worse. · Do not sit up in bed, and avoid soft couches and twisted positions. Bed rest can help relieve pain at first, but it delays healing. Avoid bed rest after the first day of back pain. · Change positions every 30 minutes. If you must sit for long periods of time, take breaks from sitting. Get up and walk around, or lie in a comfortable position. · Try using a heating pad on a low or medium setting for 15 to 20 minutes every 2 or 3 hours. Try a warm shower in place of one session with the heating pad. · You can also try an ice pack for 10 to 15 minutes every 2 to 3 hours. Put a thin cloth between the ice pack and your skin. · Take pain medicines exactly as directed. ¨ If the doctor gave you a prescription medicine for pain, take it as prescribed. ¨ If you are not taking a prescription pain medicine, ask your doctor if you can take an over-the-counter medicine. · Take short walks several times a day. You can start with 5 to 10 minutes, 3 or 4 times a day, and work up to longer walks. Walk on level surfaces and avoid hills and stairs until your back is better. · Return to work and other activities as soon as you can. Continued rest without activity is usually not good for your back. · To prevent future back pain, do exercises to stretch and strengthen your back and stomach. Learn how to use good posture, safe lifting techniques, and proper body mechanics. When should you call for help? Call your doctor now or seek immediate medical care if: 
? · You have new or worsening numbness in your legs. ? · You have new or worsening weakness in your legs. (This could make it hard to stand up.) ? · You lose control of your bladder or bowels. ? Watch closely for changes in your health, and be sure to contact your doctor if: 
? · Your pain gets worse. ? · You are not getting better after 2 weeks. Where can you learn more? Go to http://barber-tod.info/. Enter M303 in the search box to learn more about \"Back Pain: Care Instructions. \" Current as of: March 21, 2017 Content Version: 11.4 © 2834-5636 [employer]. Care instructions adapted under license by [employer] (which disclaims liability or warranty for this information). If you have questions about a medical condition or this instruction, always ask your healthcare professional. [place] [address] any warranty or liability for your use of this information. Introducing \[facility] [place] [facility]\"" & HEALTH SERVICES! Dear Efrain Scriver: 
Thank you for requesting a [employer] account. Our records indicate that you already have an active [employer] account. You can access your account anytime at [email or web address] Did you know that you can access your hospital and ER discharge instructions at any time in [employer]? You can also review all of your test results from your hospital stay or ER visit. Additional Information If you have questions, please visit the Frequently Asked Questions section of the [employer] website at [email or web address] Remember, [employer] is NOT to be used for urgent needs. For medical emergencies, dial 911. Now available from your iPhone and Android! Please provide this summary of care documentation to your next provider. Your primary care clinician is listed as Christine Worthington. If you have any questions after today's visit, please call [phone].

## 2018-02-26 NOTE — TELEPHONE ENCOUNTER
Bariatric Post-Operative Phone Calls: Week 3    Diet:Question of any nausea and/or vomiting. Question of tolerance to diet advancement from liquids to solids. Protein intake (goal is 60 grams of protein daily)   Poor____Fair____Good____Great__x__     Comment:______________________________________________________________      ______________________________________________________________________    Hydration:Less than 32 ounces of water daily is fair to poor (Goal is 64 ounces per day)   Poor____ Fair____ Good____Great___x_    Comment:______________________________________________________________    ______________________________________________________________________      Ambulation:( walking at least 3 x week, for at least 30 minutes)   Poor______ Fair______ Good___x___     Great______ Comment:__________________________________________________    ______________________________________________________________________      Urine Color: Question of any odor and color(should be fly, pale, and clear) Dark______ Amber______ Pale__x____      Clear______ Comment:___________________________________________________                           ________________________________________________________________    Bowel movements: Question of any constipation- haven't had any bowel movements for more than 3 days. This could be related to protein intake and/or narcotic pain medication usage. Comment:                                                                               okay                                               Pain: Left sided abdominal pain is normal (should be less than 3)         Question if pain medication is helpful.  10___ 9___ 8___ 7___ 6___ 5___ 4___ 3___     2___1_x__0___Comment:_________________________________________________    ______________________________________________________________________      Incision: (No redness, pain, swelling or fever) Healing Well___x___ Healed______Redness_________ Pain_________     Swelling_________ Fever__________(greater than 101 needs evaluation)    Comment:____________________________________________________________    ______________________________________________________________________  Use of incentive spirometer: Yes____       No   x        Next Appointment:___3/7/18___________                 Support Group: Yes___x___No______    Additional Comments:____________________________________________________________    ____________________________________________________________________      If more than one parameter is not met or considered poor, nurse needs to discuss with provider recommend for patient to be seen in the office as soon as possible or refer to the provider for follow-up. Reinforce to patient to use bariatric educational booklet as guide. It is appropriate to refer patient to the nutritionist to discuss more in detail of diet and nutrition.

## 2018-03-06 ENCOUNTER — TELEPHONE (OUTPATIENT)
Dept: SURGERY | Age: 53
End: 2018-03-06

## 2018-03-06 ENCOUNTER — HOSPITAL ENCOUNTER (OUTPATIENT)
Dept: LAB | Age: 53
Discharge: HOME | End: 2018-03-06
Payer: COMMERCIAL

## 2018-03-06 DIAGNOSIS — Z00.00 ROUTINE GENERAL MEDICAL EXAMINATION AT A HEALTH CARE FACILITY: ICD-10-CM

## 2018-03-06 LAB
25(OH)D3 SERPL-MCNC: 14.8 NG/ML (ref 30–100)
ALBUMIN SERPL-MCNC: 3.6 G/DL (ref 3.4–5)
ALBUMIN/GLOB SERPL: 1 {RATIO} (ref 0.8–1.7)
ALP SERPL-CCNC: 131 U/L (ref 45–117)
ALT SERPL-CCNC: 18 U/L (ref 13–56)
ANION GAP SERPL CALC-SCNC: 7 MMOL/L (ref 3–18)
AST SERPL-CCNC: 13 U/L (ref 15–37)
BASOPHILS # BLD: 0 K/UL (ref 0–0.06)
BASOPHILS NFR BLD: 1 % (ref 0–2)
BILIRUB DIRECT SERPL-MCNC: <0.1 MG/DL (ref 0–0.2)
BILIRUB SERPL-MCNC: 0.4 MG/DL (ref 0.2–1)
BUN SERPL-MCNC: 10 MG/DL (ref 7–18)
BUN/CREAT SERPL: 18 (ref 12–20)
CALCIUM SERPL-MCNC: 9 MG/DL (ref 8.5–10.1)
CHLORIDE SERPL-SCNC: 105 MMOL/L (ref 100–108)
CHOLEST SERPL-MCNC: 205 MG/DL
CO2 SERPL-SCNC: 29 MMOL/L (ref 21–32)
CREAT SERPL-MCNC: 0.56 MG/DL (ref 0.6–1.3)
DIFFERENTIAL METHOD BLD: ABNORMAL
EOSINOPHIL # BLD: 0.4 K/UL (ref 0–0.4)
EOSINOPHIL NFR BLD: 5 % (ref 0–5)
ERYTHROCYTE [DISTWIDTH] IN BLOOD BY AUTOMATED COUNT: 15 % (ref 11.6–14.5)
EST. AVERAGE GLUCOSE BLD GHB EST-MCNC: 114 MG/DL
GLOBULIN SER CALC-MCNC: 3.5 G/DL (ref 2–4)
GLUCOSE SERPL-MCNC: 96 MG/DL (ref 74–99)
HBA1C MFR BLD: 5.6 % (ref 4.2–5.6)
HCT VFR BLD AUTO: 38.3 % (ref 35–45)
HDLC SERPL-MCNC: 73 MG/DL (ref 40–60)
HDLC SERPL: 2.8 {RATIO} (ref 0–5)
HGB BLD-MCNC: 12.1 G/DL (ref 12–16)
LDLC SERPL CALC-MCNC: 92.8 MG/DL (ref 0–100)
LIPID PROFILE,FLP: ABNORMAL
LYMPHOCYTES # BLD: 3 K/UL (ref 0.9–3.6)
LYMPHOCYTES NFR BLD: 42 % (ref 21–52)
MCH RBC QN AUTO: 28.5 PG (ref 24–34)
MCHC RBC AUTO-ENTMCNC: 31.6 G/DL (ref 31–37)
MCV RBC AUTO: 90.3 FL (ref 74–97)
MONOCYTES # BLD: 0.5 K/UL (ref 0.05–1.2)
MONOCYTES NFR BLD: 7 % (ref 3–10)
NEUTS SEG # BLD: 3.1 K/UL (ref 1.8–8)
NEUTS SEG NFR BLD: 45 % (ref 40–73)
PLATELET # BLD AUTO: 285 K/UL (ref 135–420)
PMV BLD AUTO: 11.4 FL (ref 9.2–11.8)
POTASSIUM SERPL-SCNC: 4.8 MMOL/L (ref 3.5–5.5)
PROT SERPL-MCNC: 7.1 G/DL (ref 6.4–8.2)
RBC # BLD AUTO: 4.24 M/UL (ref 4.2–5.3)
SODIUM SERPL-SCNC: 141 MMOL/L (ref 136–145)
TRIGL SERPL-MCNC: 196 MG/DL (ref ?–150)
TSH SERPL DL<=0.05 MIU/L-ACNC: 3.25 UIU/ML (ref 0.36–3.74)
VLDLC SERPL CALC-MCNC: 39.2 MG/DL
WBC # BLD AUTO: 7 K/UL (ref 4.6–13.2)

## 2018-03-06 PROCEDURE — 80076 HEPATIC FUNCTION PANEL: CPT | Performed by: NURSE PRACTITIONER

## 2018-03-06 PROCEDURE — 82306 VITAMIN D 25 HYDROXY: CPT | Performed by: NURSE PRACTITIONER

## 2018-03-06 PROCEDURE — 80061 LIPID PANEL: CPT | Performed by: NURSE PRACTITIONER

## 2018-03-06 PROCEDURE — 84443 ASSAY THYROID STIM HORMONE: CPT | Performed by: NURSE PRACTITIONER

## 2018-03-06 PROCEDURE — 85025 COMPLETE CBC W/AUTO DIFF WBC: CPT | Performed by: NURSE PRACTITIONER

## 2018-03-06 PROCEDURE — 36415 COLL VENOUS BLD VENIPUNCTURE: CPT | Performed by: NURSE PRACTITIONER

## 2018-03-06 PROCEDURE — 80048 BASIC METABOLIC PNL TOTAL CA: CPT | Performed by: NURSE PRACTITIONER

## 2018-03-06 PROCEDURE — 83036 HEMOGLOBIN GLYCOSYLATED A1C: CPT | Performed by: NURSE PRACTITIONER

## 2018-03-06 NOTE — TELEPHONE ENCOUNTER
Advised patient she has to have follow up appointments here at this office, once cleared by the doctor 24/7 appointments would be an option

## 2018-03-09 ENCOUNTER — TELEPHONE (OUTPATIENT)
Dept: FAMILY MEDICINE CLINIC | Age: 53
End: 2018-03-09

## 2018-03-09 RX ORDER — ERGOCALCIFEROL 1.25 MG/1
50000 CAPSULE ORAL
Qty: 12 CAP | Refills: 3 | Status: SHIPPED | OUTPATIENT
Start: 2018-03-09

## 2018-03-09 NOTE — LETTER
3/14/2018 8:52 AM 
 
Ms. Annette Borrego [address] [place] [phone] Dear Ms. Borrego, We have been unable to reach you by phone to notify you of your test results. Please call our office at [phone] and ask to speak with my nurse in order to explain these results to you and advise you of any recommendations.  
 
 
 
Sincerely, 
 
 
Dimple Bush NP

## 2018-03-09 NOTE — TELEPHONE ENCOUNTER
----- Message from Dalton Rogers NP sent at 3/9/2018  1:18 PM EST -----  Please call the patient regarding her abnormal result. Vit D low will call in 50,000 units to take weekly, she should take 5000 units daily.

## 2018-03-14 NOTE — TELEPHONE ENCOUNTER
Third attempt to contact patient. Left message to please call office back. Will follow up with letter in mail.

## 2018-03-30 ENCOUNTER — OFFICE VISIT (OUTPATIENT)
Dept: FAMILY MEDICINE CLINIC | Age: 53
End: 2018-03-30

## 2018-03-30 VITALS
OXYGEN SATURATION: 97 % | HEART RATE: 101 BPM | BODY MASS INDEX: 41.64 KG/M2 | DIASTOLIC BLOOD PRESSURE: 92 MMHG | HEIGHT: 63 IN | WEIGHT: 235 LBS | SYSTOLIC BLOOD PRESSURE: 147 MMHG | RESPIRATION RATE: 18 BRPM | TEMPERATURE: 99.1 F

## 2018-03-30 DIAGNOSIS — M43.10 PARS DEFECT WITH SPONDYLOLISTHESIS: ICD-10-CM

## 2018-03-30 DIAGNOSIS — M54.50 CHRONIC MIDLINE LOW BACK PAIN WITHOUT SCIATICA: Primary | ICD-10-CM

## 2018-03-30 DIAGNOSIS — M43.00 PARS DEFECT WITH SPONDYLOLISTHESIS: ICD-10-CM

## 2018-03-30 DIAGNOSIS — G89.29 CHRONIC MIDLINE LOW BACK PAIN WITHOUT SCIATICA: Primary | ICD-10-CM

## 2018-03-30 NOTE — PROGRESS NOTES
Mirna Perez is a 47 y.o.  female and presents with    Chief Complaint   Patient presents with    Complete Physical         Subjective:  Patient presents for follow up on x ray results and chronic back pain. She rates pain 7/10 today and most days. D/t being s/p gastric bypass not a great candidate for NSAIDS although surgery was >10 years ago. Patient only taking tyelnol with minimal relief. No significant change to symptoms since last seen. Current Outpatient Prescriptions   Medication Sig Dispense Refill    ergocalciferol (ERGOCALCIFEROL) 50,000 unit capsule Take 1 Cap by mouth every seven (7) days. 12 Cap 3    acetaminophen (TYLENOL ARTHRITIS PAIN) 650 mg TbER Take 650 mg by mouth every eight (8) hours.  Lisdexamfetamine (VYVANSE) 70 mg capsule Take 70 mg by mouth every morning.  citalopram (CELEXA) 40 mg tablet Take 40 mg by mouth daily.  dextroamphetamine-amphetamine (ADDERALL) 15 mg tablet Take 15 mg by mouth two (2) times a day. Allergies   Allergen Reactions    Erythromycin Hives, Nausea and Vomiting and Other (comments)     several cramping    Erythromycin Base Nausea and Vomiting       Review of Systems   Musculoskeletal: Positive for back pain. All other systems reviewed and are negative. Objective:  Vitals:    03/30/18 1057   BP: (!) 147/92   Pulse: (!) 101   Resp: 18   Temp: 99.1 °F (37.3 °C)   TempSrc: Oral   SpO2: 97%   Weight: 235 lb (106.6 kg)   Height: 5' 2.5\" (1.588 m)   PainSc:   7   PainLoc: Back     Physical Exam   Constitutional: She appears well-developed and well-nourished. Cardiovascular: Normal rate, regular rhythm and normal heart sounds. Pulmonary/Chest: Effort normal and breath sounds normal.   Musculoskeletal:   No edema, deformity. +TTP, limited rom in lower spine, increased pain with stretching.     Neurological:   decreased coordination, no numbness or tingling         Assessment/Plan:    Previous labs reviewed with patient keep up good work and take Vit D on schedule. 1.  Pars defect with spondylolisthesis   Chronic midline low back pain without sciatica  Will refer to Ortho spine, continue tylenol for now and consider change to pain regiment as needed can consider tramadol, or short course steroids has worked well in past. Discussed long term affects of meds patient aware and declines for now. Will consider additional therapy but would like to await orthos opinion on intervention.   - REFERRAL TO ORTHOPEDICS      I have discussed the diagnosis with the patient and the intended plan as seen in the above orders. The patient has received an after-visit summary and questions were answered concerning future plans. I have discussed medication side effects and warnings with the patient as well. I have reviewed the plan of care with the patient, accepted their input and they are in agreement with the treatment goals. Follow-up Disposition:  Return in about 6 months (around 9/30/2018). Med check   More than 1/2 of this 30 minute visit was spent in counselling and coordination of care, as described above.     Tyree Booth FNP-BC

## 2018-03-30 NOTE — PATIENT INSTRUCTIONS

## 2018-03-30 NOTE — PROGRESS NOTES
Tad Rowland is a 47 y.o. female (: 1965) presenting to address:[place]    Chief Complaint   Patient presents with    Complete Physical       Vitals:    18 1057   BP: (!) 147/92   Pulse: (!) 101   Resp: 18   Temp: 99.1 °F (37.3 °C)   TempSrc: Oral   SpO2: 97%   Weight: 235 lb (106.6 kg)   Height: 5' 2.5\" (1.588 m)   PainSc:   7   PainLoc: Back       Hearing/Vision:   No exam data present    Learning Assessment:     Learning Assessment 2016   PRIMARY LEARNER Patient   HIGHEST LEVEL OF EDUCATION - PRIMARY LEARNER  > 4 YEARS OF COLLEGE   BARRIERS PRIMARY LEARNER NONE   CO-LEARNER CAREGIVER Yes   CO-LEARNER NAME Silvino Borrego III   CO-LEARNER HIGHEST LEVEL OF EDUCATION > 4 YEARS OF COLLEGE   BARRIERS CO-LEARNER NONE   PRIMARY LANGUAGE ENGLISH   PRIMARY LANGUAGE CO-LEARNER ENGLISH    NEED No   LEARNER PREFERENCE PRIMARY DEMONSTRATION     LISTENING     -   LEARNER PREFERENCE CO-LEARNER DEMONSTRATION   LEARNING SPECIAL TOPICS none   ANSWERED BY patient   RELATIONSHIP SELF     Depression Screening:     PHQ over the last two weeks 2017   Little interest or pleasure in doing things Not at all   Feeling down, depressed or hopeless Several days   Total Score PHQ 2 1     Fall Risk Assessment:   No flowsheet data found. Abuse Screening:     Abuse Screening Questionnaire 2018   Do you ever feel afraid of your partner? N   Are you in a relationship with someone who physically or mentally threatens you? N   Is it safe for you to go home? Y     Coordination of Care Questionaire:   1. Have you been to the ER, urgent care clinic since your last visit? Hospitalized since your last visit? no    2. Have you seen or consulted any other health care providers outside of the [address] [place] [address] since your last visit? Include any pap smears or colon screening. no    Advanced Directive:   1. Do you have an Advanced Directive? no    2. Would you like information on Advanced Directives? No    Patient has already received flu vaccine.

## 2018-04-17 ENCOUNTER — OFFICE VISIT (OUTPATIENT)
Dept: FAMILY MEDICINE CLINIC | Age: 53
End: 2018-04-17

## 2018-04-17 ENCOUNTER — HOSPITAL ENCOUNTER (OUTPATIENT)
Dept: LAB | Age: 53
Discharge: HOME | End: 2018-04-17
Payer: COMMERCIAL

## 2018-04-17 VITALS
WEIGHT: 235 LBS | DIASTOLIC BLOOD PRESSURE: 99 MMHG | RESPIRATION RATE: 18 BRPM | TEMPERATURE: 98.1 F | HEART RATE: 98 BPM | HEIGHT: 63 IN | OXYGEN SATURATION: 99 % | SYSTOLIC BLOOD PRESSURE: 151 MMHG | BODY MASS INDEX: 41.64 KG/M2

## 2018-04-17 DIAGNOSIS — L75.0 URINARY BODY ODOR: ICD-10-CM

## 2018-04-17 DIAGNOSIS — M43.00 PARS DEFECT WITH SPONDYLOLISTHESIS: ICD-10-CM

## 2018-04-17 DIAGNOSIS — L75.0 URINARY BODY ODOR: Primary | ICD-10-CM

## 2018-04-17 DIAGNOSIS — R32 URINARY INCONTINENCE, UNSPECIFIED TYPE: ICD-10-CM

## 2018-04-17 DIAGNOSIS — M43.10 PARS DEFECT WITH SPONDYLOLISTHESIS: ICD-10-CM

## 2018-04-17 LAB
BILIRUB UR QL STRIP: NEGATIVE
GLUCOSE UR-MCNC: NEGATIVE MG/DL
KETONES P FAST UR STRIP-MCNC: NEGATIVE MG/DL
PH UR STRIP: 6 [PH] (ref 4.6–8)
PROT UR QL STRIP: NEGATIVE
SP GR UR STRIP: 1.01 (ref 1–1.03)
UA UROBILINOGEN AMB POC: NORMAL (ref 0.2–1)
URINALYSIS CLARITY POC: CLEAR
URINALYSIS COLOR POC: YELLOW
URINE BLOOD POC: NEGATIVE
URINE LEUKOCYTES POC: NEGATIVE
URINE NITRITES POC: NEGATIVE

## 2018-04-17 PROCEDURE — 87086 URINE CULTURE/COLONY COUNT: CPT | Performed by: NURSE PRACTITIONER

## 2018-04-17 NOTE — PATIENT INSTRUCTIONS
Urine Test: About This Test  What is it? A urine test checks the color, clarity (clear or cloudy), odor, concentration, and acidity (pH) of your urine. It also checks your levels of protein, sugar, blood cells, or other substances in your urine. This test is sometimes called a urinalysis. Why is this test done? A urine test may be done:  · To check for a disease or infection of the urinary tract. The urinary tract includes the kidneys, the tubes that carry urine from the kidneys to the bladder (ureters), and the bladder. It also includes the tube that carries urine from the bladder to outside the body (urethra). · To check the treatment of conditions such as diabetes, kidney stones, a urinary tract infection (UTI), high blood pressure, or some kidney or liver diseases. How can you prepare for the test?  · Before the test, don't eat foods that can change the color of your urine. Examples of these include blackberries, beets, and rhubarb. · Don't do heavy exercise before the test.  · Tell your doctor if you are menstruating or close to starting your period. Your doctor may want to wait to do the test.  · Tell your doctor about all the nonprescription and prescription medicines and herbs or other supplements you take. Some of these can affect the results of this test.  What happens during the test?  A urine test can be done in your doctor's office, clinic, or lab. Or you may be asked to collect a urine sample at home. Then you can take it to the office or lab for testing. Clean-catch midstream urine collection  · Wash your hands before you start. · If the cup you are given has a lid, remove it carefully. Set it down with the inner surface up. Don't touch the inside of the cup with your fingers. · Clean the area around your genitals. ¨ For men: Pull back the foreskin, if present. Clean the head of your penis with medicated towelettes or swabs.   ¨ For women: Spread open the genital folds of skin with one hand. Then use medicated towelettes or swabs in your other hand to clean the area where urine comes out (the urethra). Wipe the area from front to back. · Start urinating into the toilet or urinal. A woman should hold apart the genital folds of skin while she urinates. · After the urine has flowed for several seconds, place the cup into the urine stream. Collect about 2 ounces of urine without stopping your flow of urine. · Don't touch the rim of the cup to your genital area. Don't get toilet paper, pubic hair, stool (feces), menstrual blood, or anything else in the urine sample. · Finish urinating into the toilet or urinal.  · Carefully replace and tighten the lid on the cup, and then return it to the lab. If you are collecting the urine at home and can't get it to the lab in an hour, refrigerate it. Double-voided urine sample collection  This method collects the urine your body is making right now. · Urinate into the toilet or urinal. Don't collect any of this urine. · Drink a large glass of water, and wait about 30 to 40 minutes. · Then get a urine sample. Follow the instructions above for collecting a clean-catch urine sample. · Take the urine sample to the lab. If you are collecting the urine at home and can't get it to the lab in an hour, refrigerate it. Follow-up care is a key part of your treatment and safety. Be sure to make and go to all appointments, and call your doctor if you are having problems. It's also a good idea to keep a list of the medicines you take. Ask your doctor when you can expect to have your test results. Where can you learn more? Go to http://barber-tod.info/. Enter R266 in the search box to learn more about \"Urine Test: About This Test.\"  Current as of: October 14, 2016  Content Version: 11.4  © 6221-5231 [employer], [employer].  Care instructions adapted under license by [employer] (which disclaims liability or warranty for this information). If you have questions about a medical condition or this instruction, always ask your healthcare professional. [place] [address] any warranty or liability for your use of this information.

## 2018-04-17 NOTE — PROGRESS NOTES
Subjective:     Annette Parikh is a 47 y.o. female who complains of abnormal smelling urine for 2 weeks. Patient denies back pain, fever, stomach ache and vaginal discharge. Patient does not have a history of recurrent UTI. Patient does not have a history of pyelonephritis. Past Medical History:   Diagnosis Date    Adverse effect of anesthesia     HAS PROBLEMS VOIDING AFTER    Anemia     Asthma     in the past, no current issues    Bladder spasms     GERD (gastroesophageal reflux disease)     Psychiatric disorder     ADHD/depression/anxiety    Unspecified adverse effect of anesthesia     Inability to urinate after all sx's with general anesthesia    Urinary frequency      Family History   Problem Relation Age of Onset    Hypertension Mother     Heart Disease Mother     Diabetes Mother     Hypertension Father     Diabetes Father     Thyroid Disease Sister     Hypertension Brother     Diabetes Brother      Current Outpatient Prescriptions   Medication Sig Dispense Refill    ergocalciferol (ERGOCALCIFEROL) 50,000 unit capsule Take 1 Cap by mouth every seven (7) days. 12 Cap 3    acetaminophen (TYLENOL ARTHRITIS PAIN) 650 mg TbER Take 650 mg by mouth every eight (8) hours.  Lisdexamfetamine (VYVANSE) 70 mg capsule Take 70 mg by mouth every morning.  citalopram (CELEXA) 40 mg tablet Take 40 mg by mouth daily.  dextroamphetamine-amphetamine (ADDERALL) 15 mg tablet Take 15 mg by mouth two (2) times a day. Allergies   Allergen Reactions    Erythromycin Hives, Nausea and Vomiting and Other (comments)     several cramping    Erythromycin Base Nausea and Vomiting     Social History     Social History    Marital status:      Spouse name: N/A    Number of children: N/A    Years of education: N/A     Occupational History    Not on file.      Social History Main Topics    Smoking status: Never Smoker    Smokeless tobacco: Never Used    Alcohol use 1.2 - 1.8 oz/week 2 - 3 Glasses of wine per week    Drug use: No    Sexual activity: Not on file     Other Topics Concern    Not on file     Social History Narrative     Review of Systems  A comprehensive review of systems was negative except for that written in the HPI. Objective:     Visit Vitals    BP (!) 151/99 (BP 1 Location: Right arm, BP Patient Position: Sitting)    Pulse 98    Temp 98.1 °F (36.7 °C) (Oral)    Resp 18    Ht 5' 2.5\" (1.588 m)    Wt 235 lb (106.6 kg)    SpO2 99%    BMI 42.3 kg/m2     General: alert, cooperative, no distress, appears stated age   Abdomen: soft, non-tender, without masses or organomegaly in the entire abdomen   Back: CVA tenderness absent   : defer exam   Rectal: deferred     Laboratory:   Urine dipstick shows negative for all components. Micro exam: not done. Urine culture pending. Assessment:     Urine odor       Plan:     1. no antibiotics today will culture urine and call patient  2. Maintain adequate hydration  3. Follow up if symptoms not improving, and prn.

## 2018-04-17 NOTE — PROGRESS NOTES
Luke Price is a 47 y.o. female (: 1965) presenting to address:urinary odor x2 weeks    Chief Complaint   Patient presents with    Urinary Odor     x2 weeks       Vitals:    18 1155   BP: (!) 151/99   Pulse: 98   Resp: 18   Temp: 98.1 °F (36.7 °C)   TempSrc: Oral   SpO2: 99%   Weight: 235 lb (106.6 kg)   Height: 5' 2.5\" (1.588 m)   PainSc:   0 - No pain       Hearing/Vision:   No exam data present    Learning Assessment:     Learning Assessment 2016   PRIMARY LEARNER Patient   HIGHEST LEVEL OF EDUCATION - PRIMARY LEARNER  > 4 YEARS OF COLLEGE   BARRIERS PRIMARY LEARNER NONE   CO-LEARNER CAREGIVER Yes   CO-LEARNER NAME Silvino Borrego III   CO-LEARNER HIGHEST LEVEL OF EDUCATION > 4 YEARS OF COLLEGE   BARRIERS CO-LEARNER NONE   PRIMARY LANGUAGE ENGLISH   PRIMARY LANGUAGE CO-LEARNER ENGLISH    NEED No   LEARNER PREFERENCE PRIMARY DEMONSTRATION     LISTENING     -   LEARNER PREFERENCE CO-LEARNER DEMONSTRATION   LEARNING SPECIAL TOPICS none   ANSWERED BY patient   RELATIONSHIP SELF     Depression Screening:     PHQ over the last two weeks 2017   Little interest or pleasure in doing things Not at all   Feeling down, depressed or hopeless Several days   Total Score PHQ 2 1     Fall Risk Assessment:   No flowsheet data found. Abuse Screening:     Abuse Screening Questionnaire 2018   Do you ever feel afraid of your partner? N   Are you in a relationship with someone who physically or mentally threatens you? N   Is it safe for you to go home? Y     Coordination of Care Questionaire:   1. Have you been to the ER, urgent care clinic since your last visit? Hospitalized since your last visit? no    2. Have you seen or consulted any other health care providers outside of the [address] [place] [address] since your last visit? Include any pap smears or colon screening. no    Advanced Directive:   1. Do you have an Advanced Directive? no    2.  Would you like information on Advanced Directives?  no

## 2018-04-19 LAB
BACTERIA SPEC CULT: NORMAL
SERVICE CMNT-IMP: NORMAL

## 2018-04-23 ENCOUNTER — TELEPHONE (OUTPATIENT)
Dept: FAMILY MEDICINE CLINIC | Age: 53
End: 2018-04-23

## 2018-04-23 ENCOUNTER — OFFICE VISIT (OUTPATIENT)
Dept: FAMILY MEDICINE CLINIC | Age: 53
End: 2018-04-23

## 2018-04-23 VITALS
DIASTOLIC BLOOD PRESSURE: 93 MMHG | TEMPERATURE: 97.3 F | RESPIRATION RATE: 18 BRPM | WEIGHT: 235 LBS | BODY MASS INDEX: 41.64 KG/M2 | HEART RATE: 91 BPM | SYSTOLIC BLOOD PRESSURE: 146 MMHG | HEIGHT: 63 IN | OXYGEN SATURATION: 97 %

## 2018-04-23 DIAGNOSIS — F90.2 ATTENTION DEFICIT HYPERACTIVITY DISORDER (ADHD), COMBINED TYPE: Primary | ICD-10-CM

## 2018-04-23 DIAGNOSIS — Z79.899 CONTROLLED SUBSTANCE AGREEMENT SIGNED: ICD-10-CM

## 2018-04-23 DIAGNOSIS — F41.9 ANXIETY AND DEPRESSION: ICD-10-CM

## 2018-04-23 DIAGNOSIS — F32.A ANXIETY AND DEPRESSION: ICD-10-CM

## 2018-04-23 RX ORDER — DEXTROAMPHETAMINE SACCHARATE, AMPHETAMINE ASPARTATE, DEXTROAMPHETAMINE SULFATE AND AMPHETAMINE SULFATE 7.5; 7.5; 7.5; 7.5 MG/1; MG/1; MG/1; MG/1
30 TABLET ORAL DAILY
Qty: 30 TAB | Refills: 0 | Status: SHIPPED | OUTPATIENT
Start: 2018-04-23 | End: 2018-04-24 | Stop reason: SDUPTHER

## 2018-04-23 RX ORDER — DEXTROAMPHETAMINE SACCHARATE, AMPHETAMINE ASPARTATE MONOHYDRATE, DEXTROAMPHETAMINE SULFATE AND AMPHETAMINE SULFATE 7.5; 7.5; 7.5; 7.5 MG/1; MG/1; MG/1; MG/1
30 CAPSULE, EXTENDED RELEASE ORAL
Qty: 30 CAP | Refills: 0 | Status: SHIPPED | OUTPATIENT
Start: 2018-04-23 | End: 2018-04-24 | Stop reason: CLARIF

## 2018-04-23 RX ORDER — CITALOPRAM 40 MG/1
40 TABLET, FILM COATED ORAL DAILY
Qty: 90 TAB | Refills: 1 | Status: SHIPPED | OUTPATIENT
Start: 2018-04-23 | End: 2019-05-07 | Stop reason: SDUPTHER

## 2018-04-23 RX ORDER — DEXTROAMPHETAMINE SACCHARATE, AMPHETAMINE ASPARTATE, DEXTROAMPHETAMINE SULFATE AND AMPHETAMINE SULFATE 7.5; 7.5; 7.5; 7.5 MG/1; MG/1; MG/1; MG/1
30 TABLET ORAL DAILY
Qty: 30 TAB | Refills: 0 | Status: SHIPPED | OUTPATIENT
Start: 2018-04-23 | End: 2018-10-11 | Stop reason: SDUPTHER

## 2018-04-23 NOTE — MR AVS SNAPSHOT
[address] [facility] [address] [phone] Patient: Rory Morse MRN: [id number] :1965 Visit Information Date & Time Provider Department Dept. Phone Encounter #  
 2018  2:00 PM Corwin Marina, Velasquez Rodriguez [phone] [id number] Follow-up Instructions Return in about 3 months (around 2018). Upcoming Health Maintenance Date Due Hepatitis C Screening 1965 Pneumococcal 19-64 Medium Risk (1 of 1 - PPSV23) 1983 DTaP/Tdap/Td series (1 - Tdap) 1985 PAP AKA CERVICAL CYTOLOGY 1985 FOBT Q 1 YEAR AGE 50-75 2014 Influenza Age 5 to Adult 2017 BREAST CANCER SCRN MAMMOGRAM 2019 Allergies as of 2018  Review Complete On: 2018 By: Corwin Marina NP Severity Noted Reaction Type Reactions Erythromycin  2014    Hives, Nausea and Vomiting, Other (comments) several cramping Erythromycin Base  2014    Nausea and Vomiting Current Immunizations  Reviewed on 2018 No immunizations on file. Not reviewed this visit You Were Diagnosed With   
  
 Codes Comments Attention deficit hyperactivity disorder (ADHD), combined type    -  Primary ICD-10-CM: F90.2 ICD-9-CM: 314.01 Anxiety and depression     ICD-10-CM: F41.9, F32.9 ICD-9-CM: 300.00, 311 Vitals BP Pulse Temp Resp Height(growth percentile) Weight(growth percentile) (!) 146/93 (BP 1 Location: Right arm, BP Patient Position: Sitting) 91 97.3 °F (36.3 °C) (Oral) 18 5' 2.5\" (1.588 m) 235 lb (106.6 kg) SpO2 BMI OB Status Smoking Status 97% 42.3 kg/m2 Hysterectomy Never Smoker BMI and BSA Data Body Mass Index Body Surface Area  
 42.3 kg/m 2 2.17 m 2 Preferred Pharmacy Pharmacy Name Phone [facility] [address] [facility] [address]. [phone] Your Updated Medication List  
  
   
This list is accurate as of 4/23/18  2:17 PM.  Always use your most recent med list.  
  
  
  
  
 citalopram 40 mg tablet Commonly known as:  [place] Take 1 Tab by mouth daily. * dextroamphetamine-amphetamine 30 mg tablet Commonly known as:  ADDERALL Take 1 Tab by mouth daily. Earliest fill date 04/23/2018 Max Daily Amount: 1 Tab * dextroamphetamine-amphetamine 30 mg tablet Commonly known as:  ADDERALL Take 1 Tab by mouth daily. Earliest fill date 05/20/2018 Max Daily Amount: 1 Tab * amphetamine-dextroamphetamine XR 30 mg XR capsule Commonly known as:  ADDERALL XR Take 1 Cap (30 mg total) by mouth every morning. Earliest fill date 06/19/2018 Max Daily Amount: 30 mg  
  
 ergocalciferol 50,000 unit capsule Commonly known as:  ERGOCALCIFEROL Take 1 Cap by mouth every seven (7) days. * Lisdexamfetamine 70 mg capsule Commonly known as:  VYVANSE Take 1 Cap (70 mg total) by mouth every morning. Earliest fill date 04/23/2018 Max Daily Amount: 70 mg * Lisdexamfetamine 70 mg capsule Commonly known as:  VYVANSE Take 1 Cap (70 mg total) by mouth every morning. Earliest fill date 05/20/2018 Max Daily Amount: 70 mg * Lisdexamfetamine 70 mg capsule Commonly known as:  VYVANSE Take 1 Cap (70 mg total) by mouth every morning. Earliest fill date 06/19/2018 Max Daily Amount: 70 mg  
  
 TYLENOL ARTHRITIS PAIN 650 mg Tber Generic drug:  acetaminophen Take 650 mg by mouth every eight (8) hours. * Notice: This list has 6 medication(s) that are the same as other medications prescribed for you. Read the directions carefully, and ask your doctor or other care provider to review them with you. Prescriptions Printed Refills Lisdexamfetamine (VYVANSE) 70 mg capsule 0 Sig: Take 1 Cap (70 mg total) by mouth every morning. Earliest fill date 04/23/2018 Max Daily Amount: 70 mg  
 Class: Print Route: Oral  
 Lisdexamfetamine (VYVANSE) 70 mg capsule 0 Sig: Take 1 Cap (70 mg total) by mouth every morning. Earliest fill date 05/20/2018 Max Daily Amount: 70 mg  
 Class: Print Route: Oral  
 Lisdexamfetamine (VYVANSE) 70 mg capsule 0 Sig: Take 1 Cap (70 mg total) by mouth every morning. Earliest fill date 06/19/2018 Max Daily Amount: 70 mg  
 Class: Print Route: Oral  
 dextroamphetamine-amphetamine (ADDERALL) 30 mg tablet 0 Sig: Take 1 Tab by mouth daily. Earliest fill date 04/23/2018 Max Daily Amount: 1 Tab Class: Print Route: Oral  
 dextroamphetamine-amphetamine (ADDERALL) 30 mg tablet 0 Sig: Take 1 Tab by mouth daily. Earliest fill date 05/20/2018 Max Daily Amount: 1 Tab Class: Print Route: Oral  
 amphetamine-dextroamphetamine XR (ADDERALL XR) 30 mg XR capsule 0 Sig: Take 1 Cap (30 mg total) by mouth every morning. Earliest fill date 06/19/2018 Max Daily Amount: 30 mg  
 Class: Print Route: Oral  
  
Prescriptions Sent to Pharmacy Refills  
 citalopram (CELEXA) 40 mg tablet 1 Sig: Take 1 Tab by mouth daily. Class: Normal  
 Pharmacy: [facility]/pharmacy #[id number]- [address] #: [phone] Route: Oral  
  
Follow-up Instructions Return in about 3 months (around 7/23/2018). Patient Instructions Learning About Attention Deficit Hyperactivity Disorder (ADHD) in Adults What is ADHD? Attention deficit hyperactivity disorder (ADHD) is a condition in which people have a hard time paying attention. Adults with ADHD also may be more active than normal. They tend to act without thinking. ADHD may make it harder for them to focus, get organized, and finish tasks. ADHD most often starts in childhood and lasts into adulthood. Many adults don't know that they have ADHD until their children are diagnosed. Then they begin to see their own symptoms. Doctors don't know what causes ADHD. But it tends to run in families. What are the symptoms? The most common types of ADHD symptoms in adults are attention problems and hyperactivity. Attention problems Adults with ADHD often find it hard to: · Finish tasks that don't interest them or aren't easy. But they may become obsessed with activities that they find interesting and enjoy. · Keep relationships. · Focus their attention on conversations, reading materials, or jobs. They may change jobs a lot. · Remember things. They may misplace or lose things. · Pay attention. They are easily distracted. They find it hard to focus on one task. · Think before they act. They may make quick decisions. They may act before they think about the effect of their actions. Hyperactivity Adults with ADHD may: · Fidget. They may swing their legs, shift in their seats, or tap their fingers. · Move around a lot. They may feel \"revved up\" or on the go. They may not be able to slow down until they are very tired. · Find it hard to relax. They may feel restless and find it hard to do quiet things like read or watch TV. How does ADHD affect daily life? ADHD in adults may affect: · Job performance. They may find it hard to organize their work, manage their time, and focus on one task at a time. They may forget, misplace, or lose things. They may quit their jobs out of boredom. · Relationships. Adults with ADHD may find it hard to focus their attention on conversations. It is hard for them to \"read\" the behavior and moods of others and express their own feelings. · Temper. They may get easily frustrated. This often can make it harder for them to deal with stress. These adults may overreact and have a short, quick temper. · The ability to solve problems.  Adults who have a hard time waiting for things they want may act before they think about the effect of their actions. They may take part in risky behaviors. These include unprotected sex, unsafe driving, alcohol and drug use, or unwise business ventures. How is ADHD treated? ?ADHD can be treated with medicines, behavior training, or counseling. Or it may be a combination of these treatments. Medicines ? Stimulant medicines are most often used to treat ADHD. These may include: 
? · Amphetamines (such as Adderall and Dexedrine). ? · Methylphenidate (such as Concerta, Daytrana, Focalin, Metadate, and Ritalin). ? Other medicines that may be used are: 
? · Atomoxetine, such as Strattera, a nonstimulant medicine for ADHD. ? · Antihypertensives. These include clonidine (such as Catapres) and guanfacine (such as Tenex). ? · Antidepressants, which include bupropion (Wellbutrin). ?Behavior training ? Behavior training can help adults with ADHD learn how to: 
? · Get organized. A daily organizer or planner can help these adults organize their daily tasks. They can write down appointments and other things they need to remember. ? · Decrease distractions. They can set up their work or home environment so that there are fewer things that will distract them. They may find using headphones or a \"white noise\" machine helpful. College students can arrange a quiet living situation. They may need a single dorm room. ? · Work on relationships. Social skills training can help adults with ADHD relate to family, friends, and coworkers. Couples counseling or family therapy can also help improve relationships. ? Counseling ? Counseling is not meant to treat inattention, hyperactivity, or impulsiveness. But it can help with some of the problems that go along with ADHD. These include not getting along well with others and having problems following rules. Where can you learn more? Go to http://barber-tod.info/.  
Enter K039 in the search box to learn more about \"Learning About Attention Deficit Hyperactivity Disorder (ADHD) in Adults. \" Current as of: May 12, 2017 Content Version: 11.4 © 1788-0007 Healthwise, Incorporated. Care instructions adapted under license by [employer] (which disclaims liability or warranty for this information). If you have questions about a medical condition or this instruction, always ask your healthcare professional. [address] any warranty or liability for your use of this information. Introducing [facility]! Dear Valentino Gonsalves: 
Thank you for requesting a Infrascale account. Our records indicate that you already have an active Infrascale account. You can access your account anytime at [email or web address] Did you know that you can access your hospital and ER discharge instructions at any time in Infrascale? You can also review all of your test results from your hospital stay or ER visit. Additional Information If you have questions, please visit the Frequently Asked Questions section of the Infrascale website at [email or web address] Remember, Infrascale is NOT to be used for urgent needs. For medical emergencies, dial 911. Now available from your iPhone and Android! Please provide this summary of care documentation to your next provider. Your primary care clinician is listed as Erich He. If you have any questions after today's visit, please call [phone].

## 2018-04-23 NOTE — PATIENT INSTRUCTIONS
Learning About Attention Deficit Hyperactivity Disorder (ADHD) in Adults  What is ADHD? Attention deficit hyperactivity disorder (ADHD) is a condition in which people have a hard time paying attention. Adults with ADHD also may be more active than normal. They tend to act without thinking. ADHD may make it harder for them to focus, get organized, and finish tasks. ADHD most often starts in childhood and lasts into adulthood. Many adults don't know that they have ADHD until their children are diagnosed. Then they begin to see their own symptoms. Doctors don't know what causes ADHD. But it tends to run in families. What are the symptoms? The most common types of ADHD symptoms in adults are attention problems and hyperactivity. Attention problems  Adults with ADHD often find it hard to:  · Finish tasks that don't interest them or aren't easy. But they may become obsessed with activities that they find interesting and enjoy. · Keep relationships. · Focus their attention on conversations, reading materials, or jobs. They may change jobs a lot. · Remember things. They may misplace or lose things. · Pay attention. They are easily distracted. They find it hard to focus on one task. · Think before they act. They may make quick decisions. They may act before they think about the effect of their actions. Hyperactivity  Adults with ADHD may:  · Fidget. They may swing their legs, shift in their seats, or tap their fingers. · Move around a lot. They may feel \"revved up\" or on the go. They may not be able to slow down until they are very tired. · Find it hard to relax. They may feel restless and find it hard to do quiet things like read or watch TV. How does ADHD affect daily life? ADHD in adults may affect:  · Job performance. They may find it hard to organize their work, manage their time, and focus on one task at a time. They may forget, misplace, or lose things.  They may quit their jobs out of boredom. · Relationships. Adults with ADHD may find it hard to focus their attention on conversations. It is hard for them to \"read\" the behavior and moods of others and express their own feelings. · Temper. They may get easily frustrated. This often can make it harder for them to deal with stress. These adults may overreact and have a short, quick temper. · The ability to solve problems. Adults who have a hard time waiting for things they want may act before they think about the effect of their actions. They may take part in risky behaviors. These include unprotected sex, unsafe driving, alcohol and drug use, or unwise business ventures. How is ADHD treated? ?ADHD can be treated with medicines, behavior training, or counseling. Or it may be a combination of these treatments. Medicines  ? Stimulant medicines are most often used to treat ADHD. These may include:  ? · Amphetamines (such as Adderall and Dexedrine). ? · Methylphenidate (such as Concerta, Daytrana, Focalin, Metadate, and Ritalin). ? Other medicines that may be used are:  ? · Atomoxetine, such as Strattera, a nonstimulant medicine for ADHD. ? · Antihypertensives. These include clonidine (such as Catapres) and guanfacine (such as Tenex). ? · Antidepressants, which include bupropion (Wellbutrin). ?Behavior training  ? Behavior training can help adults with ADHD learn how to:  ? · Get organized. A daily organizer or planner can help these adults organize their daily tasks. They can write down appointments and other things they need to remember. ? · Decrease distractions. They can set up their work or home environment so that there are fewer things that will distract them. They may find using headphones or a \"white noise\" machine helpful. College students can arrange a quiet living situation. They may need a single dorm room. ? · Work on relationships. Social skills training can help adults with ADHD relate to family, friends, and coworkers. Couples counseling or family therapy can also help improve relationships. ? Counseling  ? Counseling is not meant to treat inattention, hyperactivity, or impulsiveness. But it can help with some of the problems that go along with ADHD. These include not getting along well with others and having problems following rules. Where can you learn more? Go to http://barber-tod.info/. Enter J867 in the search box to learn more about \"Learning About Attention Deficit Hyperactivity Disorder (ADHD) in Adults. \"  Current as of: May 12, 2017  Content Version: 11.4  © 8038-7389 Healthwise, [employer]. Care instructions adapted under license by [employer] (which disclaims liability or warranty for this information). If you have questions about a medical condition or this instruction, always ask your healthcare professional. [address] any warranty or liability for your use of this information.

## 2018-04-23 NOTE — PROGRESS NOTES
Yuli Swan is a 47 y.o.  female and presents with    Chief Complaint   Patient presents with    Medication Evaluation     Subjective:  Patient presents for chronic med mgmt refills. Patient with hx adhd, depression. Long term use of vyvanse and adderall daily. Denies se's associated with medication. Uses for daily concentration and focus at work as a . Patient previously seen by Psych would like to switch providers, states stable on current tx plan. Current Outpatient Prescriptions   Medication Sig Dispense Refill    dextroamphetamine-amphetamine (ADDERALL) 30 mg tablet Take 1 Tab by mouth daily. Earliest fill date 06/19/2018 Max Daily Amount: 1 Tab 30 Tab 0    citalopram (CELEXA) 40 mg tablet Take 1 Tab by mouth daily. 90 Tab 1    Lisdexamfetamine (VYVANSE) 70 mg capsule Take 1 Cap (70 mg total) by mouth every morning. Earliest fill date 04/23/2018 Max Daily Amount: 70 mg 30 Cap 0    Lisdexamfetamine (VYVANSE) 70 mg capsule Take 1 Cap (70 mg total) by mouth every morning. Earliest fill date 05/20/2018 Max Daily Amount: 70 mg 30 Cap 0    Lisdexamfetamine (VYVANSE) 70 mg capsule Take 1 Cap (70 mg total) by mouth every morning. Earliest fill date 06/19/2018 Max Daily Amount: 70 mg 30 Cap 0    dextroamphetamine-amphetamine (ADDERALL) 30 mg tablet Take 1 Tab by mouth daily. Earliest fill date 05/20/2018 Max Daily Amount: 1 Tab 30 Tab 0    ergocalciferol (ERGOCALCIFEROL) 50,000 unit capsule Take 1 Cap by mouth every seven (7) days. 12 Cap 3    acetaminophen (TYLENOL ARTHRITIS PAIN) 650 mg TbER Take 650 mg by mouth every eight (8) hours. Allergies   Allergen Reactions    Erythromycin Hives, Nausea and Vomiting and Other (comments)     several cramping    Erythromycin Base Nausea and Vomiting       Review of Systems   Cardiovascular: Negative for chest pain and palpitations.         Tremors   Gastrointestinal:        No anorexia   Psychiatric/Behavioral: Positive for depression. The patient is nervous/anxious. All other systems reviewed and are negative. Objective:  Vitals:    04/23/18 1406   BP: (!) 146/93   Pulse: 91   Resp: 18   Temp: 97.3 °F (36.3 °C)   TempSrc: Oral   SpO2: 97%   Weight: 235 lb (106.6 kg)   Height: 5' 2.5\" (1.588 m)   PainSc:   0 - No pain     General:   Well-groomed, well-nourished, in no distress, pleasant, alert, appropriate    Cardiovasc:   No JVD. RRR,  no murmur, rubs or gallops. Pulmonary:    Lungs clear bilaterally, no wheezing, rales or rhonchi. Neuro:            Reflexes and motor strength normal and symmetric. No focal deficits. Psych:  Alert and oriented, No pressured speech or abnormal thought content     Assessment/Plan:      1. Attention deficit hyperactivity disorder (ADHD), combined type  Contract signed, refills provided rtc 3 months   - Lisdexamfetamine (VYVANSE) 70 mg capsule; Take 1 Cap (70 mg total) by mouth every morning. Earliest fill date 04/23/2018 Max Daily Amount: 70 mg  Dispense: 30 Cap; Refill: 0  - Lisdexamfetamine (VYVANSE) 70 mg capsule; Take 1 Cap (70 mg total) by mouth every morning. Earliest fill date 05/20/2018 Max Daily Amount: 70 mg  Dispense: 30 Cap; Refill: 0  - Lisdexamfetamine (VYVANSE) 70 mg capsule; Take 1 Cap (70 mg total) by mouth every morning. Earliest fill date 06/19/2018 Max Daily Amount: 70 mg  Dispense: 30 Cap; Refill: 0  - dextroamphetamine-amphetamine (ADDERALL) 30 mg tablet; Take 1 Tab by mouth daily. Earliest fill date 04/23/2018 Max Daily Amount: 1 Tab  Dispense: 30 Tab; Refill: 0  - dextroamphetamine-amphetamine (ADDERALL) 30 mg tablet; Take 1 Tab by mouth daily. Earliest fill date 05/20/2018 Max Daily Amount: 1 Tab  Dispense: 30 Tab; Refill: 0  - amphetamine-dextroamphetamine  (ADDERALL ) 30 mg  capsule; Take 1 Cap (30 mg total) by mouth every morning. Earliest fill date 06/19/2018 Max Daily Amount: 30 mg  Dispense: 30 Cap; Refill: 0    2.  Anxiety and depression  Refilled celexa continue same works well for patient. I have discussed the diagnosis with the patient and the intended plan as seen in the above orders. The patient has received an after-visit summary and questions were answered concerning future plans. I have discussed medication side effects and warnings with the patient as well. I have reviewed the plan of care with the patient, accepted their input and they are in agreement with the treatment goals. Follow-up Disposition:  Return in about 3 months (around 7/23/2018). More than 1/2 of this 15 minute visit was spent in counselling and coordination of care, as described above.     Joy Ferris [facility]

## 2018-04-23 NOTE — PROGRESS NOTES
Jose Maria Mcclelland is a 47 y.o. female (: 1965) presenting to address:medication evaluation    Chief Complaint   Patient presents with    Medication Evaluation       Vitals:    18 1406   BP: (!) 146/93   Pulse: 91   Resp: 18   Temp: 97.3 °F (36.3 °C)   TempSrc: Oral   SpO2: 97%   Weight: 235 lb (106.6 kg)   Height: 5' 2.5\" (1.588 m)   PainSc:   0 - No pain       Hearing/Vision:   No exam data present    Learning Assessment:     Learning Assessment 2016   PRIMARY LEARNER Patient   HIGHEST LEVEL OF EDUCATION - PRIMARY LEARNER  > 4 YEARS OF COLLEGE   BARRIERS PRIMARY LEARNER NONE   CO-LEARNER CAREGIVER Yes   CO-LEARNER NAME Silvino Borrego III   CO-LEARNER HIGHEST LEVEL OF EDUCATION > 4 YEARS OF COLLEGE   BARRIERS CO-LEARNER NONE   PRIMARY LANGUAGE ENGLISH   PRIMARY LANGUAGE CO-LEARNER ENGLISH    NEED No   LEARNER PREFERENCE PRIMARY DEMONSTRATION     LISTENING     -   LEARNER PREFERENCE CO-LEARNER DEMONSTRATION   LEARNING SPECIAL TOPICS none   ANSWERED BY patient   RELATIONSHIP SELF     Depression Screening:     PHQ over the last two weeks 2017   Little interest or pleasure in doing things Not at all   Feeling down, depressed or hopeless Several days   Total Score PHQ 2 1     Fall Risk Assessment:   No flowsheet data found. Abuse Screening:     Abuse Screening Questionnaire 2018   Do you ever feel afraid of your partner? N   Are you in a relationship with someone who physically or mentally threatens you? N   Is it safe for you to go home? Y     Coordination of Care Questionaire:   1. Have you been to the ER, urgent care clinic since your last visit? Hospitalized since your last visit? no    2. Have you seen or consulted any other health care providers outside of the [address] [place] [address] since your last visit? Include any pap smears or colon screening. no    Advanced Directive:   1. Do you have an Advanced Directive? no    2. Would you like information on Advanced Directives? no

## 2018-04-24 ENCOUNTER — TELEPHONE (OUTPATIENT)
Dept: FAMILY MEDICINE CLINIC | Age: 53
End: 2018-04-24

## 2018-04-24 PROBLEM — F41.9 ANXIETY AND DEPRESSION: Status: ACTIVE | Noted: 2018-04-24

## 2018-04-24 PROBLEM — F90.2 ATTENTION DEFICIT HYPERACTIVITY DISORDER (ADHD), COMBINED TYPE: Status: ACTIVE | Noted: 2018-04-24

## 2018-04-24 PROBLEM — F32.A ANXIETY AND DEPRESSION: Status: ACTIVE | Noted: 2018-04-24

## 2018-04-24 PROBLEM — Z79.899 CONTROLLED SUBSTANCE AGREEMENT SIGNED: Status: ACTIVE | Noted: 2018-04-24

## 2018-04-24 RX ORDER — DEXTROAMPHETAMINE SACCHARATE, AMPHETAMINE ASPARTATE, DEXTROAMPHETAMINE SULFATE AND AMPHETAMINE SULFATE 7.5; 7.5; 7.5; 7.5 MG/1; MG/1; MG/1; MG/1
30 TABLET ORAL DAILY
Qty: 30 TAB | Refills: 0 | Status: SHIPPED | OUTPATIENT
Start: 2018-04-24 | End: 2018-07-19 | Stop reason: SDUPTHER

## 2018-04-24 NOTE — TELEPHONE ENCOUNTER
Attempted to contact patient. Left message to please call office back. Adderall Rx given at appt has error on it. New Rx printed and at my desk but patient needs to bring Rx given at appt back to office to trade.

## 2018-05-22 ENCOUNTER — OFFICE VISIT (OUTPATIENT)
Dept: FAMILY MEDICINE CLINIC | Age: 53
End: 2018-05-22

## 2018-05-22 VITALS
OXYGEN SATURATION: 98 % | RESPIRATION RATE: 17 BRPM | HEART RATE: 95 BPM | HEIGHT: 63 IN | DIASTOLIC BLOOD PRESSURE: 96 MMHG | BODY MASS INDEX: 43.05 KG/M2 | TEMPERATURE: 98.2 F | SYSTOLIC BLOOD PRESSURE: 139 MMHG | WEIGHT: 243 LBS

## 2018-05-22 DIAGNOSIS — H10.9 BACTERIAL CONJUNCTIVITIS OF LEFT EYE: Primary | ICD-10-CM

## 2018-05-22 RX ORDER — POLYMYXIN B SULFATE AND TRIMETHOPRIM 1; 10000 MG/ML; [USP'U]/ML
1 SOLUTION OPHTHALMIC EVERY 4 HOURS
Qty: 10 ML | Refills: 0 | Status: SHIPPED | OUTPATIENT
Start: 2018-05-22 | End: 2018-05-29

## 2018-05-22 NOTE — PROGRESS NOTES
Subjective:   Annette Rosario is a 47 y.o. female who presents for evaluation of redness. She has noticed the above symptoms in the left eye for 1 day. Onset was sudden. Symptoms have included discharge, itching. Patient denies blurred vision, visual field deficit, photophobia. There is a history of allergies    Review of Systems  +itch, redness left eye    Objective:     Visit Vitals    BP (!) 139/96 (BP 1 Location: Left arm, BP Patient Position: Sitting)    Pulse 95    Temp 98.2 °F (36.8 °C) (Oral)    Resp 17    Ht 5' 2.5\" (1.588 m)    Wt 243 lb (110.2 kg)    SpO2 98%    BMI 43.74 kg/m2                 General: alert, cooperative, no distress, appears stated age   Eyes:  positive findings: conjunctivae: 1+ bacterial conjunctivitis   Vision: Not performed   Fluorescein:  not done     Assessment/Plan:     acute conjunctivitis    1. Discussed the diagnosis and proper care of conjunctivitis. Stressed household hygiene. 2. Ophthalmic drops per orders. 3. Antihistamines per orders. 4. Warm compress to eye(s). 5. Local eye care discussed. 6. Patient instructions: contagiousness precautions  7. Risks and side effects of medications was discussed. 8. Pt advised to read written information provided by the pharmacist: yes  9. Followup as needed. Encounter Diagnoses   Name Primary?  Bacterial conjunctivitis of left eye Yes     Orders Placed This Encounter    trimethoprim-polymyxin b (POLYTRIM) ophthalmic solution   .

## 2018-05-22 NOTE — LETTER
NOTIFICATION RETURN TO WORK / SCHOOL 
 
5/22/2018 Ms. Cheri Stover [address] [place] [id number] To Whom It May Concern: 
 
Annette Kruger Spencer is currently under the care of [address] [place] [address]. She will return to work/school on: 5/23/2018 If there are questions or concerns please have the patient contact our office.  
 
 
 
Sincerely, 
 
 
Nicole Blackmon NP

## 2018-05-22 NOTE — PATIENT INSTRUCTIONS
Pinkeye: Care Instructions  Your Care Instructions    Pinkeye is redness and swelling of the eye surface and the conjunctiva (the lining of the eyelid and the covering of the white part of the eye). Pinkeye is also called conjunctivitis. Pinkeye is often caused by infection with bacteria or a virus. Dry air, allergies, smoke, and chemicals are other common causes. Pinkeye often clears on its own in 7 to 10 days. Antibiotics only help if the pinkeye is caused by bacteria. Pinkeye caused by infection spreads easily. If an allergy or chemical is causing pinkeye, it will not go away unless you can avoid whatever is causing it. Follow-up care is a key part of your treatment and safety. Be sure to make and go to all appointments, and call your doctor if you are having problems. It's also a good idea to know your test results and keep a list of the medicines you take. How can you care for yourself at home? · Wash your hands often. Always wash them before and after you treat pinkeye or touch your eyes or face. · Use moist cotton or a clean, wet cloth to remove crust. Wipe from the inside corner of the eye to the outside. Use a clean part of the cloth for each wipe. · Put cold or warm wet cloths on your eye a few times a day if the eye hurts. · Do not wear contact lenses or eye makeup until the pinkeye is gone. Throw away any eye makeup you were using when you got pinkeye. Clean your contacts and storage case. If you wear disposable contacts, use a new pair when your eye has cleared and it is safe to wear contacts again. · If the doctor gave you antibiotic ointment or eyedrops, use them as directed. Use the medicine for as long as instructed, even if your eye starts looking better soon. Keep the bottle tip clean, and do not let it touch the eye area. · To put in eyedrops or ointment:  ¨ Tilt your head back, and pull your lower eyelid down with one finger.   ¨ Drop or squirt the medicine inside the lower lid.  ¨ Close your eye for 30 to 60 seconds to let the drops or ointment move around. ¨ Do not touch the ointment or dropper tip to your eyelashes or any other surface. · Do not share towels, pillows, or washcloths while you have pinkeye. When should you call for help? Call your doctor now or seek immediate medical care if:  ? · You have pain in your eye, not just irritation on the surface. ? · You have a change in vision or loss of vision. ? · You have an increase in discharge from the eye.   ? · Your eye has not started to improve or begins to get worse within 48 hours after you start using antibiotics. ? · Pinkeye lasts longer than 7 days. ? Watch closely for changes in your health, and be sure to contact your doctor if you have any problems. Where can you learn more? Go to http://barber-tod.info/. Enter Y392 in the search box to learn more about \"Pinkeye: Care Instructions. \"  Current as of: March 20, 2017  Content Version: 11.4  © 5183-4286 [employer]wise, [employer]. Care instructions adapted under license by [employer] (which disclaims liability or warranty for this information). If you have questions about a medical condition or this instruction, always ask your healthcare professional. [address] any warranty or liability for your use of this information.

## 2018-05-22 NOTE — PROGRESS NOTES
Tadeo Cuevas is a 47 y.o. female (: 1965) presenting to address:itchy eye left sided     Chief Complaint   Patient presents with   [address] [place] [address]     Left sided        Vitals:    18 1156   BP: (!) 139/96   Pulse: 95   Resp: 17   Temp: 98.2 °F (36.8 °C)   TempSrc: Oral   SpO2: 98%   Weight: 243 lb (110.2 kg)   Height: 5' 2.5\" (1.588 m)   PainSc:   0 - No pain       Hearing/Vision:   No exam data present    Learning Assessment:     Learning Assessment 2016   PRIMARY LEARNER Patient   HIGHEST LEVEL OF EDUCATION - PRIMARY LEARNER  > 4 YEARS OF COLLEGE   BARRIERS PRIMARY LEARNER NONE   CO-LEARNER CAREGIVER Yes   CO-LEARNER NAME Silvino Borrego III   CO-LEARNER HIGHEST LEVEL OF EDUCATION > 4 YEARS OF COLLEGE   BARRIERS CO-LEARNER NONE   PRIMARY LANGUAGE ENGLISH   PRIMARY LANGUAGE CO-LEARNER ENGLISH    NEED No   LEARNER PREFERENCE PRIMARY DEMONSTRATION     LISTENING     -   LEARNER PREFERENCE CO-LEARNER DEMONSTRATION   LEARNING SPECIAL TOPICS none   ANSWERED BY patient   RELATIONSHIP SELF     Depression Screening:     PHQ over the last two weeks 2017   Little interest or pleasure in doing things Not at all   Feeling down, depressed or hopeless Several days   Total Score PHQ 2 1     Fall Risk Assessment:   No flowsheet data found. Abuse Screening:     Abuse Screening Questionnaire 2018   Do you ever feel afraid of your partner? N   Are you in a relationship with someone who physically or mentally threatens you? N   Is it safe for you to go home? Y     Coordination of Care Questionaire:   1. Have you been to the ER, urgent care clinic since your last visit? Hospitalized since your last visit? no    2. Have you seen or consulted any other health care providers outside of the [address] [place] [address] since your last visit? Include any pap smears or colon screening. no    Advanced Directive:   1. Do you have an Advanced Directive? no    2.  Would you like information on Advanced Directives?  no

## 2018-05-25 ENCOUNTER — TELEPHONE (OUTPATIENT)
Dept: SURGERY | Age: 53
End: 2018-05-25

## 2018-06-19 ENCOUNTER — TELEPHONE (OUTPATIENT)
Dept: FAMILY MEDICINE CLINIC | Age: 53
End: 2018-06-19

## 2018-06-19 DIAGNOSIS — F90.2 ATTENTION DEFICIT HYPERACTIVITY DISORDER (ADHD), COMBINED TYPE: ICD-10-CM

## 2018-06-19 NOTE — TELEPHONE ENCOUNTER
Patient called and left message stating that at her appointment on 4/23/18 she was given 3 month of prescriptions for Vyvanse & Adderall however she can not find the last month of prescriptions that were due to be filled today 6/19/18 and would like to know if you would rewrite this months prescriptions for Vyvanse & Adderall. Informed her that I will send message to [address] [place] [address] however I can not guarantee that she will rewrite them due to the control substance contract she signed. Please advise.

## 2018-07-19 ENCOUNTER — OFFICE VISIT (OUTPATIENT)
Dept: FAMILY MEDICINE CLINIC | Age: 53
End: 2018-07-19

## 2018-07-19 VITALS
TEMPERATURE: 98.5 F | DIASTOLIC BLOOD PRESSURE: 75 MMHG | WEIGHT: 244 LBS | HEART RATE: 86 BPM | RESPIRATION RATE: 20 BRPM | SYSTOLIC BLOOD PRESSURE: 142 MMHG | OXYGEN SATURATION: 99 % | HEIGHT: 63 IN | BODY MASS INDEX: 43.23 KG/M2

## 2018-07-19 DIAGNOSIS — F90.2 ATTENTION DEFICIT HYPERACTIVITY DISORDER (ADHD), COMBINED TYPE: ICD-10-CM

## 2018-07-19 RX ORDER — DEXTROAMPHETAMINE SACCHARATE, AMPHETAMINE ASPARTATE, DEXTROAMPHETAMINE SULFATE AND AMPHETAMINE SULFATE 7.5; 7.5; 7.5; 7.5 MG/1; MG/1; MG/1; MG/1
TABLET ORAL
Qty: 90 TAB | Refills: 0 | Status: SHIPPED | OUTPATIENT
Start: 2018-07-19 | End: 2018-11-19 | Stop reason: SDUPTHER

## 2018-07-19 NOTE — PROGRESS NOTES
Taco Veronica is a 47 y.o. female (: 1965) presenting to address:medication refills    Chief Complaint   Patient presents with    Medication Refill       Vitals:    18 1619   BP: 142/75   Pulse: 86   Resp: 20   Temp: 98.5 °F (36.9 °C)   TempSrc: Oral   SpO2: 99%   Weight: 244 lb (110.7 kg)   Height: 5' 2.5\" (1.588 m)   PainSc:   7   PainLoc: Back       Hearing/Vision:   No exam data present    Learning Assessment:     Learning Assessment 2016   PRIMARY LEARNER Patient   HIGHEST LEVEL OF EDUCATION - PRIMARY LEARNER  > 4 YEARS OF COLLEGE   BARRIERS PRIMARY LEARNER NONE   CO-LEARNER CAREGIVER Yes   CO-LEARNER NAME Silvino Borrego III   CO-LEARNER HIGHEST LEVEL OF EDUCATION > 4 YEARS OF COLLEGE   BARRIERS CO-LEARNER NONE   PRIMARY LANGUAGE ENGLISH   PRIMARY LANGUAGE CO-LEARNER ENGLISH    NEED No   LEARNER PREFERENCE PRIMARY DEMONSTRATION     LISTENING     -   LEARNER PREFERENCE CO-LEARNER DEMONSTRATION   LEARNING SPECIAL TOPICS none   ANSWERED BY patient   RELATIONSHIP SELF     Depression Screening:     PHQ over the last two weeks 2017   Little interest or pleasure in doing things Not at all   Feeling down, depressed, irritable, or hopeless Several days   Total Score PHQ 2 1     Fall Risk Assessment:   No flowsheet data found. Abuse Screening:     Abuse Screening Questionnaire 2018   Do you ever feel afraid of your partner? N   Are you in a relationship with someone who physically or mentally threatens you? N   Is it safe for you to go home? Y     Coordination of Care Questionaire:   1. Have you been to the ER, urgent care clinic since your last visit? Hospitalized since your last visit? no    2. Have you seen or consulted any other health care providers outside of the [facility] since your last visit? Include any pap smears or colon screening. no    Advanced Directive:   1. Do you have an Advanced Directive? no    2.  Would you like information on Advanced Directives?  no

## 2018-07-19 NOTE — MR AVS SNAPSHOT
[address] [facility] [address] 
[phone] Patient: Kellie Perla MRN: [id number] :1965 Visit Information Date & Time Provider Department Dept. Phone Encounter #  
 2018  4:00 PM Bree Ramos [phone] [id number] Follow-up Instructions Return in about 3 months (around 10/19/2018). Upcoming Health Maintenance Date Due Hepatitis C Screening 1965 Pneumococcal 19-64 Medium Risk (1 of 1 - PPSV23) 1983 DTaP/Tdap/Td series (1 - Tdap) 1985 PAP AKA CERVICAL CYTOLOGY 1985 FOBT Q 1 YEAR AGE 50-75 2014 Influenza Age 5 to Adult 2018 BREAST CANCER SCRN MAMMOGRAM 2019 Allergies as of 2018  Review Complete On: 2018 By: Natalie Morris LPN Severity Noted Reaction Type Reactions Erythromycin  2014    Hives, Nausea and Vomiting, Other (comments) several cramping Erythromycin Base  2014    Nausea and Vomiting Current Immunizations  Reviewed on 2018 No immunizations on file. Not reviewed this visit You Were Diagnosed With   
  
 Codes Comments Attention deficit hyperactivity disorder (ADHD), combined type     ICD-10-CM: F90.2 ICD-9-CM: 314.01 Vitals BP Pulse Temp Resp Height(growth percentile) Weight(growth percentile) 142/75 (BP 1 Location: Left arm, BP Patient Position: Sitting) 86 98.5 °F (36.9 °C) (Oral) 20 5' 2.5\" (1.588 m) 244 lb (110.7 kg) SpO2 BMI OB Status Smoking Status 99% 43.92 kg/m2 Hysterectomy Never Smoker BMI and BSA Data Body Mass Index Body Surface Area 43.92 kg/m 2 2.21 m 2 Preferred Pharmacy Pharmacy Name Phone [facility] [address] [facility] [address] [phone]. [phone] Your Updated Medication List  
  
   
 This list is accurate as of 7/19/18  4:32 PM.  Always use your most recent med list.  
  
  
  
  
 citalopram 40 mg tablet Commonly known as:  Severo Mew Take 1 Tab by mouth daily. * dextroamphetamine-amphetamine 30 mg tablet Commonly known as:  ADDERALL Take 1 Tab by mouth daily. Earliest fill date 05/20/2018 Max Daily Amount: 1 Tab * dextroamphetamine-amphetamine 30 mg tablet Commonly known as:  ADDERALL Take 1 cap (30mg) by mouth daily  Earliest fill date 07/19/2018  
  
 ergocalciferol 50,000 unit capsule Commonly known as:  ERGOCALCIFEROL Take 1 Cap by mouth every seven (7) days. * Lisdexamfetamine 70 mg capsule Commonly known as:  VYVANSE Take 1 Cap (70 mg total) by mouth every morning. Earliest fill date 04/23/2018 Max Daily Amount: 70 mg * Lisdexamfetamine 70 mg capsule Commonly known as:  VYVANSE Take 1 Cap (70 mg total) by mouth every morning. Earliest fill date 05/20/2018 Max Daily Amount: 70 mg * Lisdexamfetamine 70 mg capsule Commonly known as:  VYVANSE Take 1 cap (70mg) by mouth daily in a.m. Earliest fill date 07/19/2018 TYLENOL ARTHRITIS PAIN 650 mg Neno Race Generic drug:  acetaminophen Take 650 mg by mouth every eight (8) hours. * Notice: This list has 5 medication(s) that are the same as other medications prescribed for you. Read the directions carefully, and ask your doctor or other care provider to review them with you. Prescriptions Printed Refills Lisdexamfetamine (VYVANSE) 70 mg capsule 0 Sig: Take 1 cap (70mg) by mouth daily in a.m. Earliest fill date 07/19/2018 Class: Print  
 dextroamphetamine-amphetamine (ADDERALL) 30 mg tablet 0 Sig: Take 1 cap (30mg) by mouth daily  Earliest fill date 07/19/2018 Class: Print Follow-up Instructions Return in about 3 months (around 10/19/2018). Patient Instructions Learning About Attention Deficit Hyperactivity Disorder (ADHD) in Adults What is ADHD? Attention deficit hyperactivity disorder (ADHD) is a condition in which people have a hard time paying attention. Adults with ADHD also may be more active than normal. They tend to act without thinking. ADHD may make it harder for them to focus, get organized, and finish tasks. ADHD most often starts in childhood and lasts into adulthood. Many adults don't know that they have ADHD until their children are diagnosed. Then they begin to see their own symptoms. Doctors don't know what causes ADHD. But it tends to run in families. What are the symptoms? The most common types of ADHD symptoms in adults are attention problems and hyperactivity. Attention problems Adults with ADHD often find it hard to: · Finish tasks that don't interest them or aren't easy. But they may become obsessed with activities that they find interesting and enjoy. · Keep relationships. · Focus their attention on conversations, reading materials, or jobs. They may change jobs a lot. · Remember things. They may misplace or lose things. · Pay attention. They are easily distracted. They find it hard to focus on one task. · Think before they act. They may make quick decisions. They may act before they think about the effect of their actions. Hyperactivity Adults with ADHD may: · Fidget. They may swing their legs, shift in their seats, or tap their fingers. · Move around a lot. They may feel \"revved up\" or on the go. They may not be able to slow down until they are very tired. · Find it hard to relax. They may feel restless and find it hard to do quiet things like read or watch TV. How does ADHD affect daily life? ADHD in adults may affect: · Job performance. They may find it hard to organize their work, manage their time, and focus on one task at a time. They may forget, misplace, or lose things. They may quit their jobs out of boredom. · Relationships. Adults with ADHD may find it hard to focus their attention on conversations. It is hard for them to \"read\" the behavior and moods of others and express their own feelings. · Temper. They may get easily frustrated. This often can make it harder for them to deal with stress. These adults may overreact and have a short, quick temper. · The ability to solve problems. Adults who have a hard time waiting for things they want may act before they think about the effect of their actions. They may take part in risky behaviors. These include unprotected sex, unsafe driving, alcohol and drug use, or unwise business ventures. How is ADHD treated? 
 ADHD can be treated with medicines, behavior training, or counseling. Or it may be a combination of these treatments. Medicines 
 Stimulant medicines are most often used to treat ADHD. These may include: 
  · Amphetamines (such as Adderall and Dexedrine).  
  · Methylphenidate (such as Concerta, Daytrana, Focalin, Metadate, and Ritalin).  
 Other medicines that may be used are: 
  · Atomoxetine, such as Strattera, a nonstimulant medicine for ADHD.  
  · Antihypertensives. These include clonidine (such as Catapres) and guanfacine (such as Tenex).   · Antidepressants, which include bupropion (Wellbutrin).  
[employer] training can help adults with ADHD learn how to: 
  · Get organized. A daily organizer or planner can help these adults organize their daily tasks. They can write down appointments and other things they need to remember.  
  · Decrease distractions. They can set up their work or home environment so that there are fewer things that will distract them. They may find using headphones or a \"white noise\" machine helpful. College students can arrange a quiet living situation. They may need a single dorm room.  
  · Work on relationships.  Social skills training can help adults with ADHD relate to family, friends, and coworkers. Couples counseling or family therapy can also help improve relationships.  
Lisette Weston is not meant to treat inattention, hyperactivity, or impulsiveness. But it can help with some of the problems that go along with ADHD. These include not getting along well with others and having problems following rules. Where can you learn more? Go to http://barber-tod.info/. Enter I471 in the search box to learn more about \"Learning About Attention Deficit Hyperactivity Disorder (ADHD) in Adults. \" Current as of: December 7, 2017 Content Version: 11.7 © 5685-1749 [employer]. Care instructions adapted under license by [employer] (which disclaims liability or warranty for this information). If you have questions about a medical condition or this instruction, always ask your healthcare professional. [address] any warranty or liability for your use of this information. Introducing [facility] [place] [facility]! Dear Sarbjit Loya: 
Thank you for requesting a [employer] account. Our records indicate that you have previously registered for a [employer] account but its currently inactive. Please call our [employer] support line at [phone]. Additional Information If you have questions, please visit the Frequently Asked Questions section of the [employer] website at [email or web address] Remember, [employer] is NOT to be used for urgent needs. For medical emergencies, dial 911. Now available from your iPhone and Android! Please provide this summary of care documentation to your next provider. Your primary care clinician is listed as Bennett Barrios. If you have any questions after today's visit, please call [phone].

## 2018-07-19 NOTE — PATIENT INSTRUCTIONS

## 2018-07-19 NOTE — PROGRESS NOTES
Thaddeus Das is a 47 y.o.  female and presents with    Chief Complaint   Patient presents with    Medication Refill         Subjective:  Patient presents for adhd medication refill. She is doing well on medication. There is no change to her medication regiment. She states no anorexia, tremors, palpitations. Focus is good with use. Current Outpatient Prescriptions   Medication Sig Dispense Refill    Lisdexamfetamine (VYVANSE) 70 mg capsule Take 1 cap (70mg) by mouth daily in a.m. Earliest fill date 07/19/2018 90 Cap 0    dextroamphetamine-amphetamine (ADDERALL) 30 mg tablet Take 1 cap (30mg) by mouth daily  Earliest fill date 07/19/2018 90 Tab 0    citalopram (CELEXA) 40 mg tablet Take 1 Tab by mouth daily. 90 Tab 1    Lisdexamfetamine (VYVANSE) 70 mg capsule Take 1 Cap (70 mg total) by mouth every morning. Earliest fill date 04/23/2018 Max Daily Amount: 70 mg 30 Cap 0    Lisdexamfetamine (VYVANSE) 70 mg capsule Take 1 Cap (70 mg total) by mouth every morning. Earliest fill date 05/20/2018 Max Daily Amount: 70 mg 30 Cap 0    dextroamphetamine-amphetamine (ADDERALL) 30 mg tablet Take 1 Tab by mouth daily. Earliest fill date 05/20/2018 Max Daily Amount: 1 Tab 30 Tab 0    ergocalciferol (ERGOCALCIFEROL) 50,000 unit capsule Take 1 Cap by mouth every seven (7) days. 12 Cap 3    acetaminophen (TYLENOL ARTHRITIS PAIN) 650 mg TbER Take 650 mg by mouth every eight (8) hours. Allergies   Allergen Reactions    Erythromycin Hives, Nausea and Vomiting and Other (comments)     several cramping    Erythromycin Base Nausea and Vomiting       ROS  All other systems reviewed and are negative.        Objective:  Vitals:    07/19/18 1619   BP: 142/75   Pulse: 86   Resp: 20   Temp: 98.5 °F (36.9 °C)   TempSrc: Oral   SpO2: 99%   Weight: 244 lb (110.7 kg)   Height: 5' 2.5\" (1.588 m)   PainSc:   7   PainLoc: Back     General:   Well-groomed, well-nourished, in no distress, pleasant, alert, appropriate Cardiovasc:   No JVD. RRR,  no murmur, rubs or gallops. Pulmonary:    Lungs clear bilaterally, no wheezing, rales or rhonchi. Psych:  Alert and oriented, No pressured speech or abnormal thought content      Assessment/Plan:      1. Attention deficit hyperactivity disorder (ADHD), combined type  Refills provided  reviewed and ok  - Lisdexamfetamine (VYVANSE) 70 mg capsule; Take 1 cap (70mg) by mouth daily in a.m. Earliest fill date 07/19/2018  Dispense: 90 Cap; Refill: 0  - dextroamphetamine-amphetamine (ADDERALL) 30 mg tablet; Take 1 cap (30mg) by mouth daily  Earliest fill date 07/19/2018  Dispense: 90 Tab; Refill: 0    I have discussed the diagnosis with the patient and the intended plan as seen in the above orders. The patient has received an after-visit summary and questions were answered concerning future plans. I have discussed medication side effects and warnings with the patient as well. I have reviewed the plan of care with the patient, accepted their input and they are in agreement with the treatment goals. Follow-up Disposition:  Return in about 3 months (around 10/19/2018). More than 1/2 of this 15 minute visit was spent in counselling and coordination of care, as described above.     Fermin LUCERO-BC

## 2018-08-10 ENCOUNTER — OFFICE VISIT (OUTPATIENT)
Dept: FAMILY MEDICINE CLINIC | Age: 53
End: 2018-08-10

## 2018-08-10 VITALS
SYSTOLIC BLOOD PRESSURE: 124 MMHG | RESPIRATION RATE: 18 BRPM | OXYGEN SATURATION: 98 % | HEIGHT: 63 IN | WEIGHT: 247.4 LBS | TEMPERATURE: 98.2 F | BODY MASS INDEX: 43.84 KG/M2 | DIASTOLIC BLOOD PRESSURE: 80 MMHG | HEART RATE: 107 BPM

## 2018-08-10 DIAGNOSIS — M54.50 CHRONIC MIDLINE LOW BACK PAIN WITHOUT SCIATICA: ICD-10-CM

## 2018-08-10 DIAGNOSIS — M43.00 PARS DEFECT WITH SPONDYLOLISTHESIS: ICD-10-CM

## 2018-08-10 DIAGNOSIS — Z02.89 ENCOUNTER FOR COMPLETION OF FORM WITH PATIENT: ICD-10-CM

## 2018-08-10 DIAGNOSIS — M43.10 PARS DEFECT WITH SPONDYLOLISTHESIS: ICD-10-CM

## 2018-08-10 DIAGNOSIS — Z01.818 PRE-OP EXAM: Primary | ICD-10-CM

## 2018-08-10 DIAGNOSIS — G89.29 CHRONIC MIDLINE LOW BACK PAIN WITHOUT SCIATICA: ICD-10-CM

## 2018-08-10 NOTE — PROGRESS NOTES
Sharifa Kolb is a 47 y.o. female is here for per op for spinal surgery. Already got labs done at [address] [place] [address], states EKG not needed, not an invasive surgery       1. Have you been to the ER, urgent care clinic since your last visit? Hospitalized since your last visit? No    2. Have you seen or consulted any other health care providers outside of the [address] [place] [address] since your last visit? Include any pap smears or colon screening.  Follicumron      Health Maintenance Due   Topic Date Due    Hepatitis C Screening  1965    Pneumococcal 19-64 Medium Risk (1 of 1 - PPSV23) 09/29/1983    DTaP/Tdap/Td series (1 - Tdap) 09/29/1985    PAP AKA CERVICAL CYTOLOGY  09/29/1985    FOBT Q 1 YEAR AGE 50-75  09/29/2014    Influenza Age 9 to Adult  08/01/2018

## 2018-08-10 NOTE — PROGRESS NOTES
Preoperative Evaluation    Date of Exam: 8/10/2018    Annette Owens is a 47 y.o. female (:1965) who presents for preoperative evaluation. Procedure/Surgery: Transforminal Lumbar body fusion L4-5 T Lift   Date of Procedure/Surgery: 2018  Surgeon: Dr Plaza Player: [facility]  Primary Physician: Sabino Elliott NP  Latex Allergy: no    Problem List:     Patient Active Problem List    Diagnosis Date Noted    Controlled substance agreement signed 2018    Attention deficit hyperactivity disorder (ADHD), combined type 2018    Anxiety and depression 2018    Pars defect with spondylolisthesis 2018    Obesity, morbid ([address] 2018    Hiatal hernia 2016    Psychiatric disorder     Asthma     Anemia     Gastric bypass status for obesity 2016    Status post Nissen fundoplication     Reactive hypoglycemia 2016    GERD (gastroesophageal reflux disease) 2014    Abdominal pain 2014     Medical History:     Past Medical History:   Diagnosis Date    Adverse effect of anesthesia     HAS PROBLEMS VOIDING AFTER    Anemia     Asthma     in the past, no current issues    Bladder spasms     GERD (gastroesophageal reflux disease)     Psychiatric disorder     ADHD/depression/anxiety    Unspecified adverse effect of anesthesia     Inability to urinate after all sx's with general anesthesia    Urinary frequency      Allergies: Allergies   Allergen Reactions    Erythromycin Hives, Nausea and Vomiting and Other (comments)     several cramping    Erythromycin Base Nausea and Vomiting      Medications:     Current Outpatient Prescriptions   Medication Sig    Lisdexamfetamine (VYVANSE) 70 mg capsule Take 1 cap (70mg) by mouth daily in a.m.  Earliest fill date 2018    dextroamphetamine-amphetamine (ADDERALL) 30 mg tablet Take 1 cap (30mg) by mouth daily  Earliest fill date 2018   Jose A Up citalopram (CELEXA) 40 mg tablet Take 1 Tab by mouth daily.  Lisdexamfetamine (VYVANSE) 70 mg capsule Take 1 Cap (70 mg total) by mouth every morning. Earliest fill date 04/23/2018 Max Daily Amount: 70 mg    Lisdexamfetamine (VYVANSE) 70 mg capsule Take 1 Cap (70 mg total) by mouth every morning. Earliest fill date 05/20/2018 Max Daily Amount: 70 mg    dextroamphetamine-amphetamine (ADDERALL) 30 mg tablet Take 1 Tab by mouth daily. Earliest fill date 05/20/2018 Max Daily Amount: 1 Tab    ergocalciferol (ERGOCALCIFEROL) 50,000 unit capsule Take 1 Cap by mouth every seven (7) days.  acetaminophen (TYLENOL ARTHRITIS PAIN) 650 mg TbER Take 650 mg by mouth every eight (8) hours. No current facility-administered medications for this visit.       Surgical History:     Past Surgical History:   Procedure Laterality Date    ABDOMEN SURGERY PROC UNLISTED      12/22/14 Laparoscopic hiatal hernia repair with Nissen fundic plication and plication of the blind jejunal limb, Repair of Incisional hernia with mesh, Intra-op EGD    ABDOMEN SURGERY PROC UNLISTED  2017    EGD C STRETCHING OF ESOPHAGUS    FULL ESOPHAGEAL MANOMETRY  12/18/2016         HX APPENDECTOMY  1989    HX BREAST REDUCTION      2006    HX CHOLECYSTECTOMY  1988    HX GASTRIC BYPASS      2004    HX GI  2006    COLONOSCOPY    HX GI  01/24/2018    Laparoscopic conversion of Nissen to Toupet fundoplication, repair of recurrent hiatal hernia with biologic mesh    HX GYN  1981    ovarian cyst    HX GYN  2006  2009    A&P REPAIRS X 2    HX HERNIA REPAIR      06 and 2016    HX HERNIA REPAIR  2005    VENTRAL    HX HYSTERECTOMY      2013    HX OTHER SURGICAL      A & P repair 2005, bladder    HX OVARIAN CYST REMOVAL      83     Social History:     Social History     Social History    Marital status:      Spouse name: N/A    Number of children: N/A    Years of education: N/A     Social History Main Topics    Smoking status: Never Smoker  Smokeless tobacco: Never Used    Alcohol use 1.2 - 1.8 oz/week     2 - 3 Glasses of wine per week    Drug use: No    Sexual activity: Not Asked     Other Topics Concern    None     Social History Narrative       Recent use of: NSAIDs for back pain    Tetanus up to date: last tetanus booster within 10 years    Anesthesia Complications: None  History of abnormal bleeding : None  History of Blood Transfusions: no  Health Care Directive or Living Will: no    REVIEW OF SYSTEMS:  A comprehensive review of systems was negative except for: Musculoskeletal: positive for back pain and muscle weakness  Neurological: positive for paresthesia, gait problems and weakness    EXAM:   Visit Vitals    /80 (BP 1 Location: Left arm, BP Patient Position: Sitting)    Pulse (!) 107    Temp 98.2 °F (36.8 °C) (Oral)    Resp 18    Ht 5' 2.5\" (1.588 m)    Wt 247 lb 6.4 oz (112.2 kg)    SpO2 98%    BMI 44.53 kg/m2     General appearance - alert, well appearing, and in no distress  Mental status - alert, oriented to person, place, and time  Eyes - pupils equal and reactive, extraocular eye movements intact  Ears - bilateral TM's and external ear canals normal  Nose - normal and patent, no erythema, discharge or polyps  Mouth - mucous membranes moist, pharynx normal without lesions  Neck - supple, no significant adenopathy  Lymphatics - no palpable lymphadenopathy, no hepatosplenomegaly  Chest - clear to auscultation, no wheezes, rales or rhonchi, symmetric air entry  Heart - normal rate, regular rhythm, normal S1, S2, no murmurs, rubs, clicks or gallops  Abdomen - soft, nontender, nondistended, no masses or organomegaly  Back exam - Limited range of motion, + tenderness, + palpable spasm and pain on motion  Neurological - alert, oriented, normal speech, weakness noted in lower extremities   Musculoskeletal -+ joint tenderness, no deformity or swelling.   Extremities - peripheral pulses normal, no pedal edema, no clubbing or cyanosis  Skin - normal coloration and turgor, no rashes, no suspicious skin lesions noted      DIAGNOSTICS:   Labs: [facility] at [facility] unable to ob[facility] copy  Please review for appropriate range and call with questions previous labs obtained for CPE are within normal limits, but also out of date range.       IMPRESSION:   None  No contraindications to planned surgery    Lee Melgar NP   8/10/2018

## 2018-08-29 ENCOUNTER — HOSPITAL ENCOUNTER (OUTPATIENT)
Dept: LAB | Age: 53
Discharge: HOME | End: 2018-08-29
Payer: COMMERCIAL

## 2018-08-29 ENCOUNTER — OFFICE VISIT (OUTPATIENT)
Dept: FAMILY MEDICINE CLINIC | Age: 53
End: 2018-08-29

## 2018-08-29 VITALS
HEART RATE: 102 BPM | RESPIRATION RATE: 16 BRPM | DIASTOLIC BLOOD PRESSURE: 82 MMHG | BODY MASS INDEX: 44.4 KG/M2 | TEMPERATURE: 97.5 F | OXYGEN SATURATION: 96 % | WEIGHT: 250.6 LBS | SYSTOLIC BLOOD PRESSURE: 121 MMHG | HEIGHT: 63 IN

## 2018-08-29 DIAGNOSIS — M43.10 PARS DEFECT WITH SPONDYLOLISTHESIS: ICD-10-CM

## 2018-08-29 DIAGNOSIS — T50.905A ADVERSE EFFECT OF DRUG, INITIAL ENCOUNTER: Primary | ICD-10-CM

## 2018-08-29 DIAGNOSIS — M43.00 PARS DEFECT WITH SPONDYLOLISTHESIS: ICD-10-CM

## 2018-08-29 DIAGNOSIS — G25.79 SEROTONIN SYNDROME: ICD-10-CM

## 2018-08-29 LAB
ALBUMIN SERPL-MCNC: 3.1 G/DL (ref 3.4–5)
ALBUMIN/GLOB SERPL: 0.9 {RATIO} (ref 0.8–1.7)
ALP SERPL-CCNC: 175 U/L (ref 45–117)
ALT SERPL-CCNC: 44 U/L (ref 13–56)
ANION GAP SERPL CALC-SCNC: 6 MMOL/L (ref 3–18)
AST SERPL-CCNC: 23 U/L (ref 15–37)
BILIRUB DIRECT SERPL-MCNC: 0.1 MG/DL (ref 0–0.2)
BILIRUB SERPL-MCNC: 0.2 MG/DL (ref 0.2–1)
BUN SERPL-MCNC: 9 MG/DL (ref 7–18)
BUN/CREAT SERPL: 18 (ref 12–20)
CALCIUM SERPL-MCNC: 8.8 MG/DL (ref 8.5–10.1)
CHLORIDE SERPL-SCNC: 110 MMOL/L (ref 100–108)
CO2 SERPL-SCNC: 27 MMOL/L (ref 21–32)
CREAT SERPL-MCNC: 0.5 MG/DL (ref 0.6–1.3)
ERYTHROCYTE [DISTWIDTH] IN BLOOD BY AUTOMATED COUNT: 13.6 % (ref 11.6–14.5)
GLOBULIN SER CALC-MCNC: 3.3 G/DL (ref 2–4)
GLUCOSE SERPL-MCNC: 90 MG/DL (ref 74–99)
HCT VFR BLD AUTO: 31.3 % (ref 35–45)
HGB BLD-MCNC: 9.9 G/DL (ref 12–16)
MAGNESIUM SERPL-MCNC: 2.4 MG/DL (ref 1.6–2.6)
MCH RBC QN AUTO: 29.1 PG (ref 24–34)
MCHC RBC AUTO-ENTMCNC: 31.6 G/DL (ref 31–37)
MCV RBC AUTO: 92.1 FL (ref 74–97)
PLATELET # BLD AUTO: 311 K/UL (ref 135–420)
PMV BLD AUTO: 10.9 FL (ref 9.2–11.8)
POTASSIUM SERPL-SCNC: 4 MMOL/L (ref 3.5–5.5)
PROT SERPL-MCNC: 6.4 G/DL (ref 6.4–8.2)
RBC # BLD AUTO: 3.4 M/UL (ref 4.2–5.3)
SODIUM SERPL-SCNC: 143 MMOL/L (ref 136–145)
WBC # BLD AUTO: 6.9 K/UL (ref 4.6–13.2)

## 2018-08-29 PROCEDURE — 85027 COMPLETE CBC AUTOMATED: CPT | Performed by: NURSE PRACTITIONER

## 2018-08-29 PROCEDURE — 83735 ASSAY OF MAGNESIUM: CPT | Performed by: NURSE PRACTITIONER

## 2018-08-29 PROCEDURE — 80076 HEPATIC FUNCTION PANEL: CPT | Performed by: NURSE PRACTITIONER

## 2018-08-29 PROCEDURE — 36415 COLL VENOUS BLD VENIPUNCTURE: CPT | Performed by: NURSE PRACTITIONER

## 2018-08-29 PROCEDURE — 80048 BASIC METABOLIC PNL TOTAL CA: CPT | Performed by: NURSE PRACTITIONER

## 2018-08-29 RX ORDER — TRAMADOL HYDROCHLORIDE 50 MG/1
50 TABLET ORAL
Qty: 30 TAB | Refills: 0 | Status: SHIPPED | OUTPATIENT
Start: 2018-08-29 | End: 2018-09-18 | Stop reason: ALTCHOICE

## 2018-08-29 RX ORDER — LORAZEPAM 0.5 MG/1
0.5 TABLET ORAL
Qty: 14 TAB | Refills: 0 | Status: SHIPPED | OUTPATIENT
Start: 2018-08-29 | End: 2018-09-05 | Stop reason: SDUPTHER

## 2018-08-29 RX ORDER — NALOXONE HYDROCHLORIDE 4 MG/.1ML
SPRAY NASAL
Qty: 2 EACH | Refills: 0 | Status: SHIPPED | OUTPATIENT
Start: 2018-08-29

## 2018-08-29 NOTE — PROGRESS NOTES
Shahab Shah is a 47 y.o. female is here for muscle spasms in different areas of her body, and medication evaluation. Patient states she has not taken any medications today and she states she last took them Monday or Tuesday. 1. Have you been to the ER, urgent care clinic since your last visit? Hospitalized since your last visit? No    2. Have you seen or consulted any other health care providers outside of the [address] [place] [address] since your last visit? Include any pap smears or colon screening. degenerative disc repair surgery in [place].

## 2018-08-29 NOTE — PROGRESS NOTES
Thaddeus Das is a 47 y.o.  female and presents with    Chief Complaint   Patient presents with    Spasms     Generalized.  Medication Evaluation    Hallucinations         Subjective:  Patient presents for concerns associated with post surgical medication. She states that for past 72 hours she has not been able to sleep, she has had tremors, hallucinations and uncontrolled crying. She denies any severe pain but states her body is in spasms all over. She was wondering if she might have serotonin syndrome. Her  is a Psychiatrist and noted that multiple medications mixed with her celexa could cause this. She denies cp, sob, n/v. She denies suicidal or homicidal ideations. Current Outpatient Prescriptions   Medication Sig Dispense Refill    traMADol (ULTRAM) 50 mg tablet Take 1 Tab by mouth every six (6) hours as needed for Pain. Max Daily Amount: 200 mg. 30 Tab 0    LORazepam (ATIVAN) 0.5 mg tablet Take 1 Tab by mouth every eight (8) hours as needed for Anxiety. Max Daily Amount: 1.5 mg. 14 Tab 0    naloxone (NARCAN) 4 mg/actuation nasal spray Use 1 spray intranasally, then discard. Repeat with new spray every 2 min as needed for opioid overdose symptoms, alternating nostrils. 2 Each 0    Lisdexamfetamine (VYVANSE) 70 mg capsule Take 1 cap (70mg) by mouth daily in a.m. Earliest fill date 07/19/2018 90 Cap 0    dextroamphetamine-amphetamine (ADDERALL) 30 mg tablet Take 1 cap (30mg) by mouth daily  Earliest fill date 07/19/2018 90 Tab 0    citalopram (CELEXA) 40 mg tablet Take 1 Tab by mouth daily. 90 Tab 1    Lisdexamfetamine (VYVANSE) 70 mg capsule Take 1 Cap (70 mg total) by mouth every morning. Earliest fill date 04/23/2018 Max Daily Amount: 70 mg 30 Cap 0    Lisdexamfetamine (VYVANSE) 70 mg capsule Take 1 Cap (70 mg total) by mouth every morning.   Earliest fill date 05/20/2018 Max Daily Amount: 70 mg 30 Cap 0    dextroamphetamine-amphetamine (ADDERALL) 30 mg tablet Take 1 Tab by mouth daily. Earliest fill date 05/20/2018 Max Daily Amount: 1 Tab 30 Tab 0    ergocalciferol (ERGOCALCIFEROL) 50,000 unit capsule Take 1 Cap by mouth every seven (7) days. 12 Cap 3    acetaminophen (TYLENOL ARTHRITIS PAIN) 650 mg TbER Take 650 mg by mouth every eight (8) hours. Allergies   Allergen Reactions    Erythromycin Hives, Nausea and Vomiting and Other (comments)     several cramping    Erythromycin Base Nausea and Vomiting       Review of Systems   Musculoskeletal: Positive for myalgias. Neurological: Positive for dizziness, tremors and weakness. Psychiatric/Behavioral: Positive for hallucinations. The patient is nervous/anxious and has insomnia. All other systems reviewed and are negative. Objective:  Vitals:    08/29/18 1319   BP: 121/82   Pulse: (!) 102   Resp: 16   Temp: 97.5 °F (36.4 °C)   TempSrc: Oral   SpO2: 96%   Weight: 250 lb 9.6 oz (113.7 kg)   Height: 5' 2.5\" (1.588 m)   PainSc:  10 - Worst pain ever   PainLoc: Generalized     General:   Well-groomed, well-nourished, in no distress, pleasant, alert, appropriate    Cardiovasc:   No JVD. RRR,  no murmur, rubs or gallops. Pulmonary:    Lungs clear bilaterally, no wheezing, rales or rhonchi. Neuro:            Reflexes and motor strength abnormal, tremors present   Psych:  Alert and oriented, pressured speak, disorganized thoughts. Anxious. Assessment/Plan:      1. Pars defect with spondylolisthesis  Continue tramadol prn no other medications mixed with this. Stop opiates. - traMADol (ULTRAM) 50 mg tablet; Take 1 Tab by mouth every six (6) hours as needed for Pain. Max Daily Amount: 200 mg. Dispense: 30 Tab; Refill: 0    2. Adverse effect of drug, initial encounter  Discussed over use of multiple medications and side effects. 3. Serotonin syndrome  Hold celexa, stop flexeril, hydrocodone  Tramadol prn  Ativan prn per serotonin syndrome benzo for sedation as per guidelines.  May help with spasms, hallucination, and sleep. Go to ER for worsened state  - CBC W/O DIFF; Future  - METABOLIC PANEL, BASIC; Future  - MAGNESIUM; Future  - HEPATIC FUNCTION PANEL; Future  - LORazepam (ATIVAN) 0.5 mg tablet; Take 1 Tab by mouth every eight (8) hours as needed for Anxiety. Max Daily Amount: 1.5 mg.  Dispense: 14 Tab; Refill: 0    Spoke with  on phone during appointment per patient request. He will watch closely and they will rtc or go to ED if worsened. I have discussed the diagnosis with the patient and the intended plan as seen in the above orders. The patient has received an after-visit summary and questions were answered concerning future plans. I have discussed medication side effects and warnings with the patient as well. I have reviewed the plan of care with the patient, accepted their input and they are in agreement with the treatment goals. Follow-up Disposition:  Return if symptoms worsen or fail to improve. More than 1/2 of this 30 minute visit was spent in counselling and coordination of care, as described above.     Mireya YODERP-BC

## 2018-09-02 NOTE — PATIENT INSTRUCTIONS
Side Effects of Medicine: Care Instructions  Your Care Instructions    Medicines are a big part of treatment for many health problems. But all medicines have side effects. Often these are mild problems. They might include a dry mouth or upset stomach. But sometimes medicines can cause dangerous side effects. One example is a bad allergic reaction. The best treatment will depend on what side effects you have. If you have a serious side effect, you may need to stop taking the medicine. You may also need to take another medicine to treat the side effect. If you have a mild side effect, it may go away after you take the medicine for a while. The doctor has checked you carefully, but problems can develop later. If you notice any problems or new symptoms, get medical treatment right away. Follow-up care is a key part of your treatment and safety. Be sure to make and go to all appointments, and call your doctor if you are having problems. It's also a good idea to know your test results and keep a list of the medicines you take. How can you care for yourself at home? · Be safe with medicines. Take your medicines exactly as prescribed. Call your doctor if you think you are having a problem with your medicine. · Call your doctor if side effects bother you and you wonder if you should keep taking a medicine. Your doctor may be able to lower your dose or change your medicine. Do not suddenly quit taking your medicine unless a doctor tells you to. · Make sure your doctor has a list of all the medicines, vitamins, supplements, and herbal remedies you take. Ask about side effects. When should you call for help? Watch closely for changes in your health, and be sure to contact your doctor if:    · You think you are having a new problem with your medicine.     · You do not get better as expected. Where can you learn more? Go to http://barber-tod.info/.   Enter D152 in the search box to learn more about \"Side Effects of Medicine: Care Instructions. \"  Current as of: September 10, 2017  Content Version: 11.7  © 8152-6672 [employer]. Care instructions adapted under license by [employer] (which disclaims liability or warranty for this information). If you have questions about a medical condition or this instruction, always ask your healthcare professional. [address] any warranty or liability for your use of this information.

## 2018-09-05 DIAGNOSIS — G25.79 SEROTONIN SYNDROME: ICD-10-CM

## 2018-09-05 RX ORDER — LORAZEPAM 0.5 MG/1
TABLET ORAL
Qty: 14 TAB | Refills: 0 | Status: SHIPPED | OUTPATIENT
Start: 2018-09-05 | End: 2018-09-24 | Stop reason: ALTCHOICE

## 2018-09-11 ENCOUNTER — OFFICE VISIT (OUTPATIENT)
Dept: FAMILY MEDICINE CLINIC | Age: 53
End: 2018-09-11

## 2018-09-11 VITALS
OXYGEN SATURATION: 98 % | HEIGHT: 63 IN | DIASTOLIC BLOOD PRESSURE: 86 MMHG | WEIGHT: 242 LBS | BODY MASS INDEX: 42.88 KG/M2 | SYSTOLIC BLOOD PRESSURE: 136 MMHG | RESPIRATION RATE: 20 BRPM | TEMPERATURE: 98.2 F | HEART RATE: 101 BPM

## 2018-09-11 DIAGNOSIS — G25.79 SEROTONIN SYNDROME: Primary | ICD-10-CM

## 2018-09-11 DIAGNOSIS — G47.9 SLEEP DISTURBANCE: ICD-10-CM

## 2018-09-11 DIAGNOSIS — Z98.890 HISTORY OF BACK SURGERY: ICD-10-CM

## 2018-09-11 RX ORDER — GABAPENTIN 100 MG/1
100 CAPSULE ORAL 3 TIMES DAILY
Qty: 90 CAP | Refills: 0 | Status: SHIPPED | OUTPATIENT
Start: 2018-09-11 | End: 2018-09-20 | Stop reason: SDUPTHER

## 2018-09-11 RX ORDER — TRAZODONE HYDROCHLORIDE 50 MG/1
50 TABLET ORAL
Qty: 30 TAB | Refills: 0 | Status: SHIPPED | OUTPATIENT
Start: 2018-09-11 | End: 2018-10-26 | Stop reason: SDUPTHER

## 2018-09-11 NOTE — PROGRESS NOTES
Jackie Greene is a 47 y.o. female is here to follow up on back pain. She states she is no longer taking ativan or tramadol     1. Have you been to the ER, urgent care clinic since your last visit? Hospitalized since your last visit? No    2. Have you seen or consulted any other health care providers outside of the [address] [place] [address] since your last visit? Include any pap smears or colon screening. [facility]        [employer] Maintenance Due   Topic Date Due    Hepatitis C Screening  1965    Pneumococcal 19-64 Medium Risk (1 of 1 - PPSV23) 09/29/1983    DTaP/Tdap/Td series (1 - Tdap) 09/29/1985    PAP AKA CERVICAL CYTOLOGY  09/29/1985    FOBT Q 1 YEAR AGE 50-75  09/29/2014    Influenza Age 5 to Adult  08/01/2018     Wants to wait for the flu shot.

## 2018-09-11 NOTE — PATIENT INSTRUCTIONS

## 2018-09-18 NOTE — PROGRESS NOTES
Rafaela Palacios is a 47 y.o.  female and presents with    Chief Complaint   Patient presents with    Sleep Problem    Post-Op Problem     Subjective:  Patient presents for follow up from last week. She was seen for problems after surgery on back. Patient likely had serotonin syndrome s/p back surgery d/t med interactions. She states today she is doing much better. She states she is no longer taking tramadol or ativan. Her sleep is improved but she is not back to normal sleep pattern yet. She states she saw surgeon a couple of days ago and he told her to follow up with PCP going forward for any ongoing concerns with pain associated with back. He suggested neurontin but did not prescribe it. Current Outpatient Prescriptions   Medication Sig Dispense Refill    traZODone (DESYREL) 50 mg tablet Take 1 Tab by mouth nightly. 30 Tab 0    gabapentin (NEURONTIN) 100 mg capsule Take 1 Cap by mouth three (3) times daily. 90 Cap 0    naloxone (NARCAN) 4 mg/actuation nasal spray Use 1 spray intranasally, then discard. Repeat with new spray every 2 min as needed for opioid overdose symptoms, alternating nostrils. 2 Each 0    Lisdexamfetamine (VYVANSE) 70 mg capsule Take 1 cap (70mg) by mouth daily in a.m. Earliest fill date 07/19/2018 90 Cap 0    dextroamphetamine-amphetamine (ADDERALL) 30 mg tablet Take 1 cap (30mg) by mouth daily  Earliest fill date 07/19/2018 90 Tab 0    citalopram (CELEXA) 40 mg tablet Take 1 Tab by mouth daily. 90 Tab 1    Lisdexamfetamine (VYVANSE) 70 mg capsule Take 1 Cap (70 mg total) by mouth every morning. Earliest fill date 04/23/2018 Max Daily Amount: 70 mg 30 Cap 0    Lisdexamfetamine (VYVANSE) 70 mg capsule Take 1 Cap (70 mg total) by mouth every morning. Earliest fill date 05/20/2018 Max Daily Amount: 70 mg 30 Cap 0    dextroamphetamine-amphetamine (ADDERALL) 30 mg tablet Take 1 Tab by mouth daily.   Earliest fill date 05/20/2018 Max Daily Amount: 1 Tab 30 Tab 0    ergocalciferol (ERGOCALCIFEROL) 50,000 unit capsule Take 1 Cap by mouth every seven (7) days. 12 Cap 3    acetaminophen (TYLENOL ARTHRITIS PAIN) 650 mg TbER Take 650 mg by mouth every eight (8) hours.  LORazepam (ATIVAN) 0.5 mg tablet TAKE 1 TAB BY MOUTH EVERY 8 HOURS AS NEEDED FOR ANXIETY 14 Tab 0     Allergies   Allergen Reactions    Erythromycin Hives, Nausea and Vomiting and Other (comments)     several cramping    Erythromycin Base Nausea and Vomiting       Review of Systems   Musculoskeletal: Positive for back pain. Psychiatric/Behavioral: The patient has insomnia. All other systems reviewed and are negative. Objective:  Vitals:    09/11/18 1409   BP: 136/86   Pulse: (!) 101   Resp: 20   Temp: 98.2 °F (36.8 °C)   TempSrc: Oral   SpO2: 98%   Weight: 242 lb (109.8 kg)   Height: 5' 2.5\" (1.588 m)   PainSc:   6   PainLoc: Back     General:   Well-groomed, well-nourished, in no distress, pleasant, alert, appropriate    Neuro:            Reflexes and motor strength normal and symmetric. No focal deficits. Back:  Area healing, no concern for infection at site. TTP at site. Psych:  Alert and oriented, No pressured speech or abnormal thought content, improved mood      Assessment/Plan:      1. Serotonin syndrome  Resolved now. She is back on her medication     2. History of back surgery  Continue back care and rehab as prescribed. Will start neurontin low dose so she can see how she responds to medication. Can adjust dose as needed. 3. Sleep disturbance  Trial trazodone will see back in 30 days and evaluate both medications. I have discussed the diagnosis with the patient and the intended plan as seen in the above orders. The patient has received an after-visit summary and questions were answered concerning future plans. I have discussed medication side effects and warnings with the patient as well.  I have reviewed the plan of care with the patient, accepted their input and they are in agreement with the treatment goals. Follow-up Disposition:  Return if symptoms worsen or fail to improve. More than 1/2 of this 30 minute visit was spent in counselling and coordination of care, as described above.     Wen LUCERO-BC

## 2018-09-20 RX ORDER — GABAPENTIN 100 MG/1
100 CAPSULE ORAL 3 TIMES DAILY
Qty: 90 CAP | Refills: 1 | Status: SHIPPED | OUTPATIENT
Start: 2018-09-20 | End: 2018-09-24 | Stop reason: ALTCHOICE

## 2018-09-20 NOTE — TELEPHONE ENCOUNTER
Pt is in need of a prescription refill of Gabapentin (Neutontin) 100 mg capsules due to having the recommended dosage increased by Grayson Truong to 500mg 3xs a day, please advise.

## 2018-09-21 ENCOUNTER — PATIENT MESSAGE (OUTPATIENT)
Dept: FAMILY MEDICINE CLINIC | Age: 53
End: 2018-09-21

## 2018-09-21 NOTE — TELEPHONE ENCOUNTER
From: Ute Borrego  Sent: 9/21/2018 8:30 AM EDT  Subject: Prescription Question    To Ines Estrella:    I have called several times and left messages regarding the gabapentin. I did increase the dose and have so far found 500mg 3 x day to work. I am now out of the medication. My pharmacist told me you called in a script yesterday for 100mg 3x a day. - of course they won't fill this because I shouldn't have run out if that is what I was taking; but since we were trying to figure out the dose I needed I am out and a bit nauseous    I am requesting that you call in a new script for 500mg 3 x a day. to [facility] on [address] [facility] asa you are able. Thank you for being on this pain management with me. Annette Borrego

## 2018-09-24 ENCOUNTER — TELEPHONE (OUTPATIENT)
Dept: FAMILY MEDICINE CLINIC | Age: 53
End: 2018-09-24

## 2018-09-24 RX ORDER — GABAPENTIN 600 MG/1
600 TABLET ORAL 3 TIMES DAILY
Qty: 90 TAB | Refills: 2 | Status: SHIPPED | OUTPATIENT
Start: 2018-09-24 | End: 2019-02-04

## 2018-09-24 NOTE — TELEPHONE ENCOUNTER
Pt called back and states that the wrong medication was called in for her. She states that her gabapentin was supposed to be called in for 500 mg 3x day for a 90 day supply. What was called in was 100mg 3x day. Pt would like the mediation to be called in today, if at all possible.

## 2018-09-26 NOTE — TELEPHONE ENCOUNTER
Call placed to KASIE gómez with information.  It doesn't appear she has not read the [employer] sent back to her

## 2018-09-26 NOTE — TELEPHONE ENCOUNTER
This medication was already filled and a message was emailed to the patient. LPN to call patient and explain previous note. On dose.

## 2018-10-11 DIAGNOSIS — F90.2 ATTENTION DEFICIT HYPERACTIVITY DISORDER (ADHD), COMBINED TYPE: ICD-10-CM

## 2018-10-11 NOTE — TELEPHONE ENCOUNTER
Patient wanted to 1:  Thank the entire office staff for being so friendly and patient with her through out her care here. 2. Patient is going out of town. Please print prescriptions requested for 90 day  Supply per  Her insurance, for her to fill when it is time to.

## 2018-10-15 RX ORDER — DEXTROAMPHETAMINE SACCHARATE, AMPHETAMINE ASPARTATE, DEXTROAMPHETAMINE SULFATE AND AMPHETAMINE SULFATE 7.5; 7.5; 7.5; 7.5 MG/1; MG/1; MG/1; MG/1
30 TABLET ORAL DAILY
Qty: 30 TAB | Refills: 0 | Status: SHIPPED | OUTPATIENT
Start: 2018-10-15 | End: 2018-11-19 | Stop reason: SDUPTHER

## 2018-10-26 ENCOUNTER — HOSPITAL ENCOUNTER (OUTPATIENT)
Dept: LAB | Age: 53
Discharge: HOME | End: 2018-10-26
Payer: COMMERCIAL

## 2018-10-26 ENCOUNTER — OFFICE VISIT (OUTPATIENT)
Dept: FAMILY MEDICINE CLINIC | Age: 53
End: 2018-10-26

## 2018-10-26 VITALS
SYSTOLIC BLOOD PRESSURE: 154 MMHG | HEIGHT: 63 IN | TEMPERATURE: 97.8 F | BODY MASS INDEX: 43.77 KG/M2 | RESPIRATION RATE: 18 BRPM | DIASTOLIC BLOOD PRESSURE: 95 MMHG | WEIGHT: 247 LBS | OXYGEN SATURATION: 98 % | HEART RATE: 98 BPM

## 2018-10-26 DIAGNOSIS — F90.2 ATTENTION DEFICIT HYPERACTIVITY DISORDER (ADHD), COMBINED TYPE: ICD-10-CM

## 2018-10-26 DIAGNOSIS — Z13.0 SCREENING FOR DEFICIENCY ANEMIA: ICD-10-CM

## 2018-10-26 DIAGNOSIS — Z13.0 SCREENING FOR DEFICIENCY ANEMIA: Primary | ICD-10-CM

## 2018-10-26 LAB
ERYTHROCYTE [DISTWIDTH] IN BLOOD BY AUTOMATED COUNT: 13.9 % (ref 11.6–14.5)
FERRITIN SERPL-MCNC: 19 NG/ML (ref 8–388)
FOLATE SERPL-MCNC: 10.7 NG/ML (ref 3.1–17.5)
HCT VFR BLD AUTO: 36.8 % (ref 35–45)
HGB BLD-MCNC: 11.3 G/DL (ref 12–16)
MCH RBC QN AUTO: 27.8 PG (ref 24–34)
MCHC RBC AUTO-ENTMCNC: 30.7 G/DL (ref 31–37)
MCV RBC AUTO: 90.4 FL (ref 74–97)
PLATELET # BLD AUTO: 280 K/UL (ref 135–420)
PMV BLD AUTO: 10.6 FL (ref 9.2–11.8)
RBC # BLD AUTO: 4.07 M/UL (ref 4.2–5.3)
VIT B12 SERPL-MCNC: 258 PG/ML (ref 211–911)
WBC # BLD AUTO: 4.9 K/UL (ref 4.6–13.2)

## 2018-10-26 PROCEDURE — 85027 COMPLETE CBC AUTOMATED: CPT | Performed by: NURSE PRACTITIONER

## 2018-10-26 PROCEDURE — 36415 COLL VENOUS BLD VENIPUNCTURE: CPT | Performed by: NURSE PRACTITIONER

## 2018-10-26 PROCEDURE — 82728 ASSAY OF FERRITIN: CPT | Performed by: NURSE PRACTITIONER

## 2018-10-26 PROCEDURE — 82607 VITAMIN B-12: CPT | Performed by: NURSE PRACTITIONER

## 2018-10-26 RX ORDER — TRAZODONE HYDROCHLORIDE 50 MG/1
50 TABLET ORAL
Qty: 90 TAB | Refills: 1 | Status: SHIPPED | OUTPATIENT
Start: 2018-10-26 | End: 2019-03-15 | Stop reason: ALTCHOICE

## 2018-10-26 NOTE — PROGRESS NOTES
Madan Harrell is a 46 y.o.  female and presents with    Chief Complaint   Patient presents with    Medication Evaluation         Subjective:  Patient presents for adhd medication refill. She is doing well on medication. There is no change to her medication regiment. She states no anorexia, tremors, palpitations, headaches. Focus is good with use, she has not been taking medication for several weeks d/t back surgery just restarted. She does struggle with concentration when anemic as well will do panel to r/o. Also request refill of sleep aid used 1-3 times weekly.          Current Outpatient Medications   Medication Sig Dispense Refill    Lisdexamfetamine (VYVANSE) 70 mg capsule Take 1 Cap (70 mg total) by mouth every morningEarliest Fill Date: 10/26/18. Earliest fill date 10/15/2018 Max Daily Amount: 70 mg 90 Cap 0    traZODone (DESYREL) 50 mg tablet Take 1 Tab by mouth nightly. 90 Tab 1    dextroamphetamine-amphetamine (ADDERALL) 30 mg tablet Take 1 Tab by mouth dailyEarliest Fill Date: 10/15/18. Earliest fill date 10/15/2018 Max Daily Amount: 1 Tab 30 Tab 0    gabapentin (NEURONTIN) 600 mg tablet Take 1 Tab by mouth three (3) times daily. 90 Tab 2    naloxone (NARCAN) 4 mg/actuation nasal spray Use 1 spray intranasally, then discard. Repeat with new spray every 2 min as needed for opioid overdose symptoms, alternating nostrils. 2 Each 0    Lisdexamfetamine (VYVANSE) 70 mg capsule Take 1 cap (70mg) by mouth daily in a.m. Earliest fill date 07/19/2018 90 Cap 0    dextroamphetamine-amphetamine (ADDERALL) 30 mg tablet Take 1 cap (30mg) by mouth daily  Earliest fill date 07/19/2018 90 Tab 0    citalopram (CELEXA) 40 mg tablet Take 1 Tab by mouth daily. 90 Tab 1    Lisdexamfetamine (VYVANSE) 70 mg capsule Take 1 Cap (70 mg total) by mouth every morning.   Earliest fill date 04/23/2018 Max Daily Amount: 70 mg 30 Cap 0    ergocalciferol (ERGOCALCIFEROL) 50,000 unit capsule Take 1 Cap by mouth every seven (7) days. 12 Cap 3    acetaminophen (TYLENOL ARTHRITIS PAIN) 650 mg TbER Take 650 mg by mouth every eight (8) hours. Allergies   Allergen Reactions    Erythromycin Hives, Nausea and Vomiting and Other (comments)     several cramping    Erythromycin Base Nausea and Vomiting       Review of Systems   Psychiatric/Behavioral: Positive for depression. The patient has insomnia.         + concentration issues   All other systems reviewed and are negative. Objective:  Vitals:    10/26/18 1010   BP: (!) 154/95   Pulse: 98   Resp: 18   Temp: 97.8 °F (36.6 °C)   TempSrc: Oral   SpO2: 98%   Weight: 247 lb (112 kg)   Height: 5' 2.5\" (1.588 m)   PainSc:   3   PainLoc: Back     General:   Well-groomed, well-nourished, in no distress, pleasant, alert, appropriate    Cardiovasc:   No JVD. RRR,  no murmur, rubs or gallops. Pulmonary:    Lungs clear bilaterally, no wheezing, rales or rhonchi. Psych:  Alert and oriented, No pressured speech or abnormal thought content      Assessment/Plan:      1. Attention deficit hyperactivity disorder (ADHD), combined type   reviewed and ok  - Lisdexamfetamine (VYVANSE) 70 mg capsule; Take 1 Cap (70 mg total) by mouth every morningEarliest Fill Date: 10/26/18. Earliest fill date 10/15/2018 Max Daily Amount: 70 mg  Dispense: 90 Cap; Refill: 0    2. Screening for deficiency anemia  - CBC W/O DIFF; Future  - FERRITIN; Future  - VITAMIN B12 & FOLATE; Future    I have discussed the diagnosis with the patient and the intended plan as seen in the above orders. The patient has received an after-visit summary and questions were answered concerning future plans. I have discussed medication side effects and warnings with the patient as well. I have reviewed the plan of care with the patient, accepted their input and they are in agreement with the treatment goals. Follow-up Disposition:  Return in about 3 months (around 1/26/2019).   More than 1/2 of this 30 minute visit was spent in counselling and coordination of care, as described above.     Ezio Osborne FN-BC

## 2018-10-26 NOTE — PROGRESS NOTES
Corry Real is a 46 y.o. female (: 1965) presenting to address:medication eval    Chief Complaint   Patient presents with    Medication Evaluation       Vitals:    10/26/18 1010   BP: (!) 154/95   Pulse: 98   Resp: 18   Temp: 97.8 °F (36.6 °C)   TempSrc: Oral   SpO2: 98%   Weight: 247 lb (112 kg)   Height: 5' 2.5\" (1.588 m)   PainSc:   3   PainLoc: Back       Hearing/Vision:   No exam data present    Learning Assessment:     Learning Assessment 2016   PRIMARY LEARNER Patient   HIGHEST LEVEL OF EDUCATION - PRIMARY LEARNER  > 4 YEARS OF COLLEGE   BARRIERS PRIMARY LEARNER NONE   CO-LEARNER CAREGIVER Yes   CO-LEARNER NAME Silvino Borrego III   CO-LEARNER HIGHEST LEVEL OF EDUCATION > 4 YEARS OF COLLEGE   BARRIERS CO-LEARNER NONE   PRIMARY LANGUAGE ENGLISH   PRIMARY LANGUAGE CO-LEARNER ENGLISH    NEED No   LEARNER PREFERENCE PRIMARY DEMONSTRATION     LISTENING     -   LEARNER PREFERENCE CO-LEARNER DEMONSTRATION   LEARNING SPECIAL TOPICS none   ANSWERED BY patient   RELATIONSHIP SELF     Depression Screening:     PHQ over the last two weeks 2017   Little interest or pleasure in doing things Not at all   Feeling down, depressed, irritable, or hopeless Several days   Total Score PHQ 2 1     Fall Risk Assessment:   No flowsheet data found. Abuse Screening:     Abuse Screening Questionnaire 2018   Do you ever feel afraid of your partner? N   Are you in a relationship with someone who physically or mentally threatens you? N   Is it safe for you to go home? Y     Coordination of Care Questionaire:   1. Have you been to the ER, urgent care clinic since your last visit? Hospitalized since your last visit? [facility]    2. Have you seen or consulted any other health care providers outside of the [address] [place] [address] since your last visit? Include any pap smears or colon screening. no    Advanced Directive:   1. Do you have an Advanced Directive? no    2.  Would you like information on Advanced Directives?  No

## 2018-10-26 NOTE — PATIENT INSTRUCTIONS
Anemia: Care Instructions  Your Care Instructions    Anemia is a low level of red blood cells, which carry oxygen throughout your body. Many things can cause anemia. Lack of iron is one of the most common causes. Your body needs iron to make hemoglobin, a substance in red blood cells that carries oxygen from the lungs to your body's cells. Without enough iron, the body produces fewer and smaller red blood cells. As a result, your body's cells do not get enough oxygen, and you feel tired and weak. And you may have trouble concentrating. Bleeding is the most common cause of a lack of iron. You may have heavy menstrual bleeding or bleeding caused by conditions such as ulcers, hemorrhoids, or cancer. Regular use of aspirin or other anti-inflammatory medicines (such as ibuprofen) also can cause bleeding in some people. A lack of iron in your diet also can cause anemia, especially at times when the body needs more iron, such as during pregnancy, infancy, and the teen years. Your doctor may have prescribed iron pills. It may take several months of treatment for your iron levels to return to normal. Your doctor also may suggest that you eat foods that are rich in iron, such as meat and beans. There are many other causes of anemia. It is not always due to a lack of iron. Finding the specific cause of your anemia will help your doctor find the right treatment for you. Follow-up care is a key part of your treatment and safety. Be sure to make and go to all appointments, and call your doctor if you are having problems. It's also a good idea to know your test results and keep a list of the medicines you take. How can you care for yourself at home? · Take your medicines exactly as prescribed. Call your doctor if you think you are having a problem with your medicine. · If your doctor recommends iron pills, take them as directed:  ? Try to take the pills on an empty stomach about 1 hour before or 2 hours after meals. But you may need to take iron with food to avoid an upset stomach. ? Do not take antacids or drink milk or caffeine drinks (such as coffee, tea, or cola) at the same time or within 2 hours of the time that you take your iron. They can make it hard for your body to absorb the iron. ? Vitamin C (from food or supplements) helps your body absorb iron. Try taking iron pills with a glass of orange juice or some other food that is high in vitamin C, such as citrus fruits. ? Iron pills may cause stomach problems, such as heartburn, nausea, diarrhea, constipation, and cramps. Be sure to drink plenty of fluids, and include fruits, vegetables, and fiber in your diet each day. Iron pills often make your bowel movements dark or green. ? If you forget to take an iron pill, do not take a double dose of iron the next time you take a pill. ? Keep iron pills out of the reach of small children. An overdose of iron can be very dangerous. · Follow your doctor's advice about eating iron-rich foods. These include red meat, shellfish, poultry, eggs, beans, raisins, whole-grain bread, and leafy green vegetables. · Steam vegetables to help them keep their iron content. When should you call for help? Call 911 anytime you think you may need emergency care. For example, call if:    · You have symptoms of a heart attack. These may include:  ? Chest pain or pressure, or a strange feeling in the chest.  ? Sweating. ? Shortness of breath. ? Nausea or vomiting. ? Pain, pressure, or a strange feeling in the back, neck, jaw, or upper belly or in one or both shoulders or arms. ? Lightheadedness or sudden weakness. ? A fast or irregular heartbeat. After you call 911, the  may tell you to chew 1 adult-strength or 2 to 4 low-dose aspirin. Wait for an ambulance.  Do not try to drive yourself.     · You passed out (lost consciousness).    Call your doctor now or seek immediate medical care if:    · You have new or increased shortness of breath.     · You are dizzy or lightheaded, or you feel like you may faint.     · Your fatigue and weakness continue or get worse.     · You have any abnormal bleeding, such as:  ? Nosebleeds. ? Vaginal bleeding that is different (heavier, more frequent, at a different time of the month) than what you are used to.  ? Bloody or black stools, or rectal bleeding. ? Bloody or pink urine.    Watch closely for changes in your health, and be sure to contact your doctor if:    · You do not get better as expected. Where can you learn more? Go to http://barber-tod.info/. Enter R301 in the search box to learn more about \"Anemia: Care Instructions. \"  Current as of: May 7, 2018  Content Version: 11.8  © 5124-4128 [employer]. Care instructions adapted under license by [employer] (which disclaims liability or warranty for this information). If you have questions about a medical condition or this instruction, always ask your healthcare professional. [address] any warranty or liability for your use of this information.

## 2018-10-31 NOTE — PROGRESS NOTES
Please call the patient regarding her abnormal result. Ferritin lower end of normal but all other tests wnl. H&H better than 2 months ago. Looks like improvements would not recommend iron at this time but can recheck labs in 1 month and go from there. Attempted to contact patient. LM to CB.  Tyler Carbajal

## 2018-11-15 ENCOUNTER — TELEPHONE (OUTPATIENT)
Dept: FAMILY MEDICINE CLINIC | Age: 53
End: 2018-11-15

## 2018-11-15 DIAGNOSIS — F90.2 ATTENTION DEFICIT HYPERACTIVITY DISORDER (ADHD), COMBINED TYPE: ICD-10-CM

## 2018-11-15 NOTE — TELEPHONE ENCOUNTER
Pt called because she was told to message NIC Miranda for her adderal rx refill and that she would refill her rx however she would have to  the rx, she was calling to see if it was ready. I did not see any [facility] messages or previous encounters.  Please advise

## 2018-11-16 NOTE — TELEPHONE ENCOUNTER
Pt called checking on the status of her requesting stating that she, \"doesnt want us to close without getting her medication\"     I spoke with Moisés Leger and she informed me that the medication was not ready that she had a 72 hour time frame and the rx would be ready by 5pm on Monday. I informed pt of this, pt became very upset at this because of her adderall, she states that she is out of her rx and she started this prcoess on Monday (no message in system). She told me to go back there and say something to Moisés Leger which I told her that I could not force the provider to write the rx. Pt stated that she understood that I couldn't make the provider do anything but she still wanted me to say something.  I told her I would inform Moisés Leger her message and pt hung up the phone

## 2018-11-19 RX ORDER — DEXTROAMPHETAMINE SACCHARATE, AMPHETAMINE ASPARTATE, DEXTROAMPHETAMINE SULFATE AND AMPHETAMINE SULFATE 7.5; 7.5; 7.5; 7.5 MG/1; MG/1; MG/1; MG/1
TABLET ORAL
Qty: 90 TAB | Refills: 0 | Status: SHIPPED | OUTPATIENT
Start: 2018-11-19 | End: 2019-02-04 | Stop reason: SDUPTHER

## 2018-11-19 NOTE — TELEPHONE ENCOUNTER
Pt just received vyvanse #90 on 10/26/2018 not due until January. Will fill Adderall for #90 may  today.

## 2018-11-26 ENCOUNTER — TELEPHONE (OUTPATIENT)
Dept: SURGERY | Age: 53
End: 2018-11-26

## 2019-01-21 DIAGNOSIS — F90.2 ATTENTION DEFICIT HYPERACTIVITY DISORDER (ADHD), COMBINED TYPE: ICD-10-CM

## 2019-01-21 NOTE — TELEPHONE ENCOUNTER
Last filled: 10/26/2018 90 w/ 0    Per MyChart, 90 days needed for insurance. Patient is going out of town at end of week.

## 2019-02-04 ENCOUNTER — OFFICE VISIT (OUTPATIENT)
Dept: FAMILY MEDICINE CLINIC | Age: 54
End: 2019-02-04

## 2019-02-04 VITALS
BODY MASS INDEX: 43.94 KG/M2 | OXYGEN SATURATION: 98 % | HEART RATE: 96 BPM | RESPIRATION RATE: 22 BRPM | DIASTOLIC BLOOD PRESSURE: 99 MMHG | SYSTOLIC BLOOD PRESSURE: 125 MMHG | HEIGHT: 63 IN | WEIGHT: 248 LBS | TEMPERATURE: 97.7 F

## 2019-02-04 DIAGNOSIS — F90.2 ATTENTION DEFICIT HYPERACTIVITY DISORDER (ADHD), COMBINED TYPE: ICD-10-CM

## 2019-02-04 DIAGNOSIS — Z23 ENCOUNTER FOR IMMUNIZATION: Primary | ICD-10-CM

## 2019-02-04 PROBLEM — M48.062 LUMBAR STENOSIS WITH NEUROGENIC CLAUDICATION: Status: ACTIVE | Noted: 2018-07-27

## 2019-02-04 PROBLEM — M51.36 DDD (DEGENERATIVE DISC DISEASE), LUMBAR: Status: ACTIVE | Noted: 2018-07-27

## 2019-02-04 PROBLEM — M43.10 SPONDYLOLISTHESIS, GRADE 2: Status: ACTIVE | Noted: 2018-07-27

## 2019-02-04 RX ORDER — DEXTROAMPHETAMINE SACCHARATE, AMPHETAMINE ASPARTATE, DEXTROAMPHETAMINE SULFATE AND AMPHETAMINE SULFATE 7.5; 7.5; 7.5; 7.5 MG/1; MG/1; MG/1; MG/1
TABLET ORAL
Qty: 90 TAB | Refills: 0 | Status: SHIPPED | OUTPATIENT
Start: 2019-02-04 | End: 2019-04-12 | Stop reason: SDUPTHER

## 2019-02-04 NOTE — PROGRESS NOTES
Edgard Pardo is a 46 y.o.  female and presents with    Chief Complaint   Patient presents with    Medication Evaluation     patient would like the flu shot today     Subjective:  Patient presents for refill of adhd medication. She states she uses medication for focus and concentration daily. She states she takes med most days. No se's from use. Current Outpatient Medications   Medication Sig Dispense Refill    dextroamphetamine-amphetamine (ADDERALL) 30 mg tablet Earliest Fill Date: 2/4/19. Take 1 cap (30mg) by mouth daily  Earliest fill date 11/19/2018 90 Tab 0    Lisdexamfetamine (VYVANSE) 70 mg capsule Take 1 Cap (70 mg total) by mouth every morningEarliest Fill Date: 1/22/19. Earliest fill date 10/15/2018 Max Daily Amount: 70 mg 90 Cap 0    traZODone (DESYREL) 50 mg tablet Take 1 Tab by mouth nightly. 90 Tab 1    naloxone (NARCAN) 4 mg/actuation nasal spray Use 1 spray intranasally, then discard. Repeat with new spray every 2 min as needed for opioid overdose symptoms, alternating nostrils. 2 Each 0    Lisdexamfetamine (VYVANSE) 70 mg capsule Take 1 cap (70mg) by mouth daily in a.m. Earliest fill date 07/19/2018 90 Cap 0    citalopram (CELEXA) 40 mg tablet Take 1 Tab by mouth daily. 90 Tab 1    Lisdexamfetamine (VYVANSE) 70 mg capsule Take 1 Cap (70 mg total) by mouth every morning. Earliest fill date 04/23/2018 Max Daily Amount: 70 mg 30 Cap 0    ergocalciferol (ERGOCALCIFEROL) 50,000 unit capsule Take 1 Cap by mouth every seven (7) days. 12 Cap 3    acetaminophen (TYLENOL ARTHRITIS PAIN) 650 mg TbER Take 650 mg by mouth every eight (8) hours. Allergies   Allergen Reactions    Erythromycin Hives, Nausea and Vomiting and Other (comments)     several cramping    Erythromycin Base Nausea and Vomiting       Review of Systems   Psychiatric/Behavioral:        +focus and concentration issues   All other systems reviewed and are negative.        Objective:  Vitals:    02/04/19 0946 02/04/19 0951   BP: (!) 144/97 (!) 125/99   Pulse: 96    Resp: 22    Temp: 97.7 °F (36.5 °C)    TempSrc: Oral    SpO2: 98%    Weight: 248 lb (112.5 kg)    Height: 5' 2.5\" (1.588 m)    PainSc:   0 - No pain      General:   Well-groomed, well-nourished, in no distress, pleasant, alert, appropriate    Cardiovasc:   No JVD. RRR,  no murmur, rubs or gallops. Pulmonary:    Lungs clear bilaterally, no wheezing, rales or rhonchi. Assessment/Plan:      1. Attention deficit hyperactivity disorder (ADHD), combined type   reviewed and ok   - dextroamphetamine-amphetamine (ADDERALL) 30 mg tablet; Earliest Fill Date: 2/4/19. Take 1 cap (30mg) by mouth daily  Earliest fill date 11/19/2018  Dispense: 90 Tab; Refill: 0    2. Encounter for immunization  - INFLUENZA VIRUS VAC QUAD,SPLIT,PRESV FREE SYRINGE IM    I have discussed the diagnosis with the patient and the intended plan as seen in the above orders. The patient has received an after-visit summary and questions were answered concerning future plans. I have discussed medication side effects and warnings with the patient as well. I have reviewed the plan of care with the patient, accepted their input and they are in agreement with the treatment goals. Follow-up Disposition:  Return in about 6 months (around 8/4/2019). More than 1/2 of this 30 minute visit was spent in counselling and coordination of care, as described above.     Fiona James [facility]-BC

## 2019-02-04 NOTE — PROGRESS NOTES
Flu Immunization/s administered 2/4/2019 by Sonya Franz LPN with consent. Patient tolerated procedure well. No reactions noted.

## 2019-02-04 NOTE — PROGRESS NOTES
Jose Randle is a 46 y.o. female (: 1965) presenting to address:    Chief Complaint   Patient presents with    Medication Evaluation     patient would like the flu shot today       Vitals:    19 0946   BP: (!) 144/97   Pulse: 96   Resp: 22   Temp: 97.7 °F (36.5 °C)   TempSrc: Oral   SpO2: 98%   Weight: 248 lb (112.5 kg)   Height: 5' 2.5\" (1.588 m)   PainSc:   0 - No pain       Hearing/Vision:   No exam data present    Learning Assessment:     Learning Assessment 2016   PRIMARY LEARNER Patient   HIGHEST LEVEL OF EDUCATION - PRIMARY LEARNER  > 4 YEARS OF COLLEGE   BARRIERS PRIMARY LEARNER NONE   CO-LEARNER CAREGIVER Yes   CO-LEARNER NAME Silvino Borrego III   CO-LEARNER HIGHEST LEVEL OF EDUCATION > 4 YEARS OF COLLEGE   BARRIERS CO-LEARNER NONE   PRIMARY LANGUAGE ENGLISH   PRIMARY LANGUAGE CO-LEARNER ENGLISH    NEED No   LEARNER PREFERENCE PRIMARY DEMONSTRATION     LISTENING     -   LEARNER PREFERENCE CO-LEARNER DEMONSTRATION   LEARNING SPECIAL TOPICS none   ANSWERED BY patient   RELATIONSHIP SELF     Depression Screening:     PHQ over the last two weeks 2017   Little interest or pleasure in doing things Not at all   Feeling down, depressed, irritable, or hopeless Several days   Total Score PHQ 2 1     Fall Risk Assessment:   No flowsheet data found. Abuse Screening:     Abuse Screening Questionnaire 2018   Do you ever feel afraid of your partner? N   Are you in a relationship with someone who physically or mentally threatens you? N   Is it safe for you to go home? Y     Coordination of Care Questionaire:   1. Have you been to the ER, urgent care clinic since your last visit? Hospitalized since your last visit? NO    2. Have you seen or consulted any other health care providers outside of the [facility] since your last visit? Include any pap smears or colon screening. Yes Dr. Srinivas Sarabia    Advanced Directive:   1. Do you have an Advanced Directive? NO    2.  Would you like information on Advanced Directives?  NO

## 2019-02-26 DIAGNOSIS — K91.2 POSTOPERATIVE MALABSORPTION: Primary | ICD-10-CM

## 2019-03-01 ENCOUNTER — HOSPITAL ENCOUNTER (OUTPATIENT)
Dept: LAB | Age: 54
Discharge: HOME | End: 2019-03-01
Payer: COMMERCIAL

## 2019-03-01 LAB
ANION GAP SERPL CALC-SCNC: 8 MMOL/L (ref 3–18)
BASOPHILS # BLD: 0 K/UL (ref 0–0.1)
BASOPHILS NFR BLD: 0 % (ref 0–2)
BUN SERPL-MCNC: 12 MG/DL (ref 7–18)
BUN/CREAT SERPL: 20 (ref 12–20)
CALCIUM SERPL-MCNC: 8.6 MG/DL (ref 8.5–10.1)
CHLORIDE SERPL-SCNC: 105 MMOL/L (ref 100–108)
CO2 SERPL-SCNC: 27 MMOL/L (ref 21–32)
CREAT SERPL-MCNC: 0.59 MG/DL (ref 0.6–1.3)
DIFFERENTIAL METHOD BLD: ABNORMAL
EOSINOPHIL # BLD: 0.2 K/UL (ref 0–0.4)
EOSINOPHIL NFR BLD: 3 % (ref 0–5)
ERYTHROCYTE [DISTWIDTH] IN BLOOD BY AUTOMATED COUNT: 15.1 % (ref 11.6–14.5)
FERRITIN SERPL-MCNC: 14 NG/ML (ref 8–388)
FOLATE SERPL-MCNC: 18.4 NG/ML (ref 3.1–17.5)
GLUCOSE SERPL-MCNC: 89 MG/DL (ref 74–99)
HCT VFR BLD AUTO: 36.9 % (ref 35–45)
HGB BLD-MCNC: 11.7 G/DL (ref 12–16)
LYMPHOCYTES # BLD: 2.3 K/UL (ref 0.9–3.6)
LYMPHOCYTES NFR BLD: 36 % (ref 21–52)
MCH RBC QN AUTO: 28 PG (ref 24–34)
MCHC RBC AUTO-ENTMCNC: 31.7 G/DL (ref 31–37)
MCV RBC AUTO: 88.3 FL (ref 74–97)
MONOCYTES # BLD: 0.5 K/UL (ref 0.05–1.2)
MONOCYTES NFR BLD: 8 % (ref 3–10)
NEUTS SEG # BLD: 3.5 K/UL (ref 1.8–8)
NEUTS SEG NFR BLD: 53 % (ref 40–73)
PLATELET # BLD AUTO: 277 K/UL (ref 135–420)
PMV BLD AUTO: 11.3 FL (ref 9.2–11.8)
POTASSIUM SERPL-SCNC: 4.7 MMOL/L (ref 3.5–5.5)
RBC # BLD AUTO: 4.18 M/UL (ref 4.2–5.3)
SODIUM SERPL-SCNC: 140 MMOL/L (ref 136–145)
VIT B12 SERPL-MCNC: 360 PG/ML (ref 211–911)
WBC # BLD AUTO: 6.5 K/UL (ref 4.6–13.2)

## 2019-03-01 PROCEDURE — 82306 VITAMIN D 25 HYDROXY: CPT

## 2019-03-01 PROCEDURE — 36415 COLL VENOUS BLD VENIPUNCTURE: CPT

## 2019-03-01 PROCEDURE — 84425 ASSAY OF VITAMIN B-1: CPT

## 2019-03-01 PROCEDURE — 85025 COMPLETE CBC W/AUTO DIFF WBC: CPT

## 2019-03-01 PROCEDURE — 82728 ASSAY OF FERRITIN: CPT

## 2019-03-01 PROCEDURE — 80048 BASIC METABOLIC PNL TOTAL CA: CPT

## 2019-03-01 PROCEDURE — 82607 VITAMIN B-12: CPT

## 2019-03-04 LAB — VIT B1 BLD-SCNC: 100.7 NMOL/L (ref 66.5–200)

## 2019-03-06 LAB
25(OH)D2 SERPL-MCNC: 18 NG/ML
25(OH)D3 SERPL-MCNC: 7.2 NG/ML
25(OH)D3+25(OH)D2 SERPL-MCNC: 25 NG/ML

## 2019-03-08 ENCOUNTER — OFFICE VISIT (OUTPATIENT)
Dept: SURGERY | Age: 54
End: 2019-03-08

## 2019-03-08 VITALS
DIASTOLIC BLOOD PRESSURE: 92 MMHG | BODY MASS INDEX: 45 KG/M2 | HEART RATE: 93 BPM | HEIGHT: 63 IN | WEIGHT: 254 LBS | TEMPERATURE: 98.4 F | OXYGEN SATURATION: 98 % | SYSTOLIC BLOOD PRESSURE: 146 MMHG

## 2019-03-08 DIAGNOSIS — F90.9 ATTENTION DEFICIT HYPERACTIVITY DISORDER (ADHD), UNSPECIFIED ADHD TYPE: ICD-10-CM

## 2019-03-08 DIAGNOSIS — Z98.890 HISTORY OF NISSEN FUNDOPLICATION: ICD-10-CM

## 2019-03-08 DIAGNOSIS — Z98.84 HISTORY OF GASTRIC BYPASS: ICD-10-CM

## 2019-03-08 DIAGNOSIS — F32.A ANXIETY AND DEPRESSION: ICD-10-CM

## 2019-03-08 DIAGNOSIS — E66.01 MORBID OBESITY (HCC): Primary | ICD-10-CM

## 2019-03-08 DIAGNOSIS — Z91.199 MEDICALLY NONCOMPLIANT: ICD-10-CM

## 2019-03-08 DIAGNOSIS — F41.9 ANXIETY AND DEPRESSION: ICD-10-CM

## 2019-03-08 DIAGNOSIS — K91.2 POST-RESECTION MALABSORPTION: ICD-10-CM

## 2019-03-08 NOTE — PATIENT INSTRUCTIONS
If you have any questions or concerns about today's appointment, the verbal and/or written instructions you were given for follow up care, please call our office at [phone].     [facility] - [address], [address] [place] [address]    [phone] office  [phone]

## 2019-03-08 NOTE — PROGRESS NOTES
Kwaku Schwartz is a 46 y.o. female (: 1965) presenting to address:    Chief Complaint   Patient presents with    Follow-up     1 year annual visit        Medication list and allergies have been reviewed with Annette Peck and updated as of today's date. I have gone over all Medical, Surgical and Social History with Kwaku Schwartz and updated/added the information accordingly. 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 She reports that she is consistently gaining weight. She wants to explore options for weight loss and diet management. Pt expresses to me she did research because she really wants someone who is heavily involved in her care and is strict. 1. Have you been to the ER, urgent care clinic since your last visit? Hospitalized since your last visit? No    2. Have you seen or consulted any other health care providers outside of the [address] [place] [address] since your last visit? Include any pap smears or colon screening.  Yes pt has had back john in [facility]

## 2019-03-08 NOTE — PROGRESS NOTES
Bariatric Follow Up Note    Annette Vincent Guardian is a 46 y.o. female is now 15 mos status post laparoscopic revision of Nissen Fundoplication with mesh and resection of afferent blind limb with Dr. Bin Gonzalez on January 24, 2018 at [facility]. She has been lost to follow up since her 2 week visit. Patient with original gastric bypass in 2004. Pt had revision gastric bypass-modified Nissen with HHR in 2014 for GERD symptoms. Pt tells me she initially lost weight to a total weight of 150lbs in 2006 regaining 100lbs since. Pt admits to multiple social issues including marital issues, family death issues, child issues all triggering \"anxiety/depression. \" Pt having admitted trouble coping with issues and has turned to abusing etOH. 1-2 glasses of wine 5 nights per week. Also drinking carbonation/sodas. Returns today to get back on track. Reaching out for help. Not currently seeing a therapist.     Having trouble overall. Currently on a regular diet without difficulty admits to taking in too many carbs. Taking in >100oz fluid,  Not counting g protein. Not exercising. The patient denies hair loss. Bowel movements are regular. The patient is not having any pain. . She claims to be compliant with multivitamins, Protein, calcium, Vit D and B12 supplements. The patient reports no difficulty eating/drinking. The patient denies smoking, NSAID use. Admits to Critical access hospital use and carbonation ingestion.      Weight Loss Metrics 3/8/2019 2/4/2019 10/26/2018 9/11/2018 8/29/2018 8/10/2018 7/19/2018   Pre op / Initial Wt - - - - - - -   Today's Wt 254 lb 248 lb 247 lb 242 lb 250 lb 9.6 oz 247 lb 6.4 oz 244 lb   BMI 45.72 kg/m2 44.64 kg/m2 44.46 kg/m2 43.56 kg/m2 45.1 kg/m2 44.53 kg/m2 43.92 kg/m2   Ideal Body Wt - - - - - - -   Excess Body Wt - - - - - - -   Goal Wt - - - - - - -   Wt loss to date - - - - - - -   % Wt Loss - - - - - - -   80% EBW - - - - - - -       Body mass index is 45.72 kg/m². Comorbidities:    Hypertension:n/a  Diabetes: n/a  Obstructive Sleep Apnea:n/a  Hyperlipidemia: n/a  Stress Urinary Incontinence: improved  Gastroesophageal Reflux: resolved  Weight related arthropathy: worse        Past Medical History:   Diagnosis Date    Adverse effect of anesthesia     HAS PROBLEMS VOIDING AFTER    Anemia     Asthma     in the past, no current issues    Bladder spasms     Depression     GERD (gastroesophageal reflux disease)     Psychiatric disorder     ADHD/depression/anxiety    Unspecified adverse effect of anesthesia     Inability to urinate after all sx's with general anesthesia    Urinary frequency        Past Surgical History:   Procedure Laterality Date    ABDOMEN SURGERY PROC UNLISTED      12/22/14 Laparoscopic hiatal hernia repair with Nissen fundic plication and plication of the blind jejunal limb, Repair of Incisional hernia with mesh, Intra-op EGD    ABDOMEN SURGERY PROC UNLISTED  2017    EGD C STRETCHING OF ESOPHAGUS    FULL ESOPHAGEAL MANOMETRY  12/18/2016         HX APPENDECTOMY  1989    HX BREAST REDUCTION      2006    HX CHOLECYSTECTOMY  1988    HX GASTRIC BYPASS      2004    HX GI  2006    COLONOSCOPY    HX GI  01/24/2018    Laparoscopic conversion of Nissen to Toupet fundoplication, repair of recurrent hiatal hernia with biologic mesh    HX GYN  1981    ovarian cyst    HX GYN  2006  2009    A&P REPAIRS X 2    HX HERNIA REPAIR      06 and 2016    HX HERNIA REPAIR  2005    VENTRAL    HX HYSTERECTOMY      2013    HX ORTHOPAEDIC  08/2018    \"i broke my back\"    HX OTHER SURGICAL      A & P repair 2005, bladder    HX OVARIAN CYST REMOVAL      83       Current Outpatient Medications   Medication Sig Dispense Refill    Multivitamins with Fluoride (MULTI-VITAMIN PO) Take  by mouth.  dextroamphetamine-amphetamine (ADDERALL) 30 mg tablet Earliest Fill Date: 2/4/19.   Take 1 cap (30mg) by mouth daily  Earliest fill date 11/19/2018 90 Tab 0    traZODone (DESYREL) 50 mg tablet Take 1 Tab by mouth nightly. 90 Tab 1    naloxone (NARCAN) 4 mg/actuation nasal spray Use 1 spray intranasally, then discard. Repeat with new spray every 2 min as needed for opioid overdose symptoms, alternating nostrils. 2 Each 0    citalopram (CELEXA) 40 mg tablet Take 1 Tab by mouth daily. 90 Tab 1    Lisdexamfetamine (VYVANSE) 70 mg capsule Take 1 Cap (70 mg total) by mouth every morning. Earliest fill date 04/23/2018 Max Daily Amount: 70 mg 30 Cap 0    ergocalciferol (ERGOCALCIFEROL) 50,000 unit capsule Take 1 Cap by mouth every seven (7) days. 12 Cap 3    calcium carb/vitamin D3/vit K1 (SOFT CHEWS CALCIUM PO) Take  by mouth.  Lisdexamfetamine (VYVANSE) 70 mg capsule Take 1 Cap (70 mg total) by mouth every morningEarliest Fill Date: 1/22/19. Earliest fill date 10/15/2018 Max Daily Amount: 70 mg 90 Cap 0    Lisdexamfetamine (VYVANSE) 70 mg capsule Take 1 cap (70mg) by mouth daily in a.m. Earliest fill date 07/19/2018 90 Cap 0    acetaminophen (TYLENOL ARTHRITIS PAIN) 650 mg TbER Take 650 mg by mouth every eight (8) hours. Allergies   Allergen Reactions    Erythromycin Hives, Nausea and Vomiting and Other (comments)     several cramping    Erythromycin Base Nausea and Vomiting       ROS:  Review of Systems:  Positives in bold    Constitutional:Unexpected weight gain/loss, night sweats,fatigue/maliase/lethargy, change in sleep, fever, rash  Eyes:  Visual changes, headaches, eye pain, floaters  ENT:  Rhinorrhea, epistaxis, change in hearing, gingival bleeding, sore throat, dysphagia  CV:  Denies chest pain, shortness of breath, difficulty breathing, orthopnea, palpitations, loss of consciousness, claudication  Resp: Cough, wheeze, haemoptysis, sob, exercise intolerence  GI:  Abdominal pain, dysphagia, reflux, bloating, cramping. Obstipation, haematemesis, brbpr, hematochezia,dark tarry stools. Nausea, vomitting, diarrhea, constipation.     Neuro: Paresthesias, numbness, weakness, changes in balance, changes in speech, loss of control of bowel or bladder,   Psych: Changes in depression, anxiety, sleep patterns, change in energy Levels    Remainder of ROS as per HPI. Physicial Exam:  Visit Vitals  BP (!) 146/92 (BP Patient Position: Sitting)   Pulse 93   Temp 98.4 °F (36.9 °C) (Oral)   Ht 5' 2.5\" (1.588 m)   Wt 115.2 kg (254 lb)   SpO2 98%   BMI 45.72 kg/m²         General: AAOX3, pleasant and cooperative to exam. Appropriately groomed. NAD. Non-toxic in appearance. Appears stated age. HENT: NC/AT. PERRLA. Extraocular motions are intact. Sclera anicteric, Conjunctiva Clear. Nares clear. Oropharynx pink, moist without exudate or erythema. Uvula Midline. Neck:  Supple, trachea is midline. No JVD, Lymphadenopathy. No bruits. Chest: Good equal bilateral expansion  Lungs: Clear to auscultation bilaterally without e/o crackles, wheezes or rhales. Heart: RRR, S1 and S2 noted. No c/r/g/vpmi. +murmor  Abdomen: obese, soft and non-tender without distension. Good bowel sounds. No vis/palp masses or pulsations. No organo-splenomegaly. No hernias to my exam. No e/o acute abdomen or peritoneal signs. Previous surgical wounds well-healed. Pelvis: Stable. :  Deferred  Rectal: Deferred  Extremities: Positive pulses in all 4 extremities. Baseline range of motion in all 4 extremities. Strength, sensation and reflexes intact, appropriate and equal in b/l upper and lower extremities. No C/C. +2 edema to mid-calf b/l. Neuro: CN II-XII grossly intact without focal deficit. Ambulatory. Skin: Clean, warm and dry. Labs: reviewed with patient. Thiamine wnl's. Paraesthesias likely musculoskeletal. Will  Increase BA Vit D3. Assessment/Plan: Pt is about 14 years s/p initial gastric bypass with 2 subsequent revisions with a total weight loss of 54lbs to date, struggling. She was instructed to:  1. Stop etOH  2. Stop Carbonated/sweetened beverages  3.  Avoid carbs  4. Go to 3-4 high protein low/no carb diet. Palm sized portion of lean protein, finger sized portion of vegetable other than carrots or corn. May have additional serving of protein between meals if truly hungry but no grazing! 5. Must keep food diary for 2 weeks and f/u with dietician. 6. Must see Bariatric psychologist. Pt with admitted addiction issues and needs to learn coping mechanisms. 7. Close f/u for back on track. Will See in 3 mos which will hold her accountable and allow her to see consultants and implement their recommended changes before f/u with me. 8. Goal of 12-24 lbs loss prior to next visit. (New weight goal: 242-230)     Encouraged support group attendance. Advised exercise program of 20-30 minutes daily 5-7 times per week. Recommended utilizing bariatric multivitamins or at least adult chewable multivitamins in lieu of flintstones and/or flintstones gummies. Follow up 3 mos as outlined above, sooner as needed. Health Maintenance issues reviewed and except as it relates to bariatrics, deferred to primary care. Greater than 50% of this 30 minute visit was spent couseling the patient about the aforementioned issues.          Alejandro Fam, MS, PA-C

## 2019-03-15 RX ORDER — TRAZODONE HYDROCHLORIDE 50 MG/1
50 TABLET ORAL
Qty: 90 TAB | Refills: 1 | Status: CANCELLED | OUTPATIENT
Start: 2019-03-15

## 2019-03-15 RX ORDER — TRAZODONE HYDROCHLORIDE 100 MG/1
100 TABLET ORAL
Qty: 90 TAB | Refills: 1 | Status: SHIPPED | OUTPATIENT
Start: 2019-03-15 | End: 2019-09-07 | Stop reason: SDUPTHER

## 2019-03-15 NOTE — TELEPHONE ENCOUNTER
Last refilled 10/26/2018 for 90 with 1 .  Patient has been taking 2 tablets daily and is requesting for 100 mg tablets

## 2019-03-28 ENCOUNTER — DOCUMENTATION ONLY (OUTPATIENT)
Dept: SURGERY | Age: 54
End: 2019-03-28

## 2019-03-28 NOTE — PROGRESS NOTES
Patient visits RD weighing 241. She was 254 last month. She brought her food diary. RD provided some guidance. She is set to see me in 1 month.

## 2019-04-12 DIAGNOSIS — F90.2 ATTENTION DEFICIT HYPERACTIVITY DISORDER (ADHD), COMBINED TYPE: ICD-10-CM

## 2019-04-12 RX ORDER — DEXTROAMPHETAMINE SACCHARATE, AMPHETAMINE ASPARTATE, DEXTROAMPHETAMINE SULFATE AND AMPHETAMINE SULFATE 7.5; 7.5; 7.5; 7.5 MG/1; MG/1; MG/1; MG/1
TABLET ORAL
Qty: 90 TAB | Refills: 0 | Status: SHIPPED | OUTPATIENT
Start: 2019-04-12 | End: 2019-08-12 | Stop reason: SDUPTHER

## 2019-04-12 NOTE — TELEPHONE ENCOUNTER
Requested Prescriptions     Pending Prescriptions Disp Refills    dextroamphetamine-amphetamine (ADDERALL) 30 mg tablet 90 Tab 0     Sig: Take 1 cap (30mg) by mouth daily  Earliest fill date 11/19/2018     Patient calling to request refill on:      Remaining pills:  Last appt:2/4/19  Next appt: Future Appointments   Date Time Provider Kolby Anglin   4/24/2019  1:00 PM [facility] DIETICIAN [facility] [address] [facility] [address]: written prescription      Patient aware of 72 hour time frame.

## 2019-04-15 ENCOUNTER — TELEPHONE (OUTPATIENT)
Dept: FAMILY MEDICINE CLINIC | Age: 54
End: 2019-04-15

## 2019-04-15 NOTE — TELEPHONE ENCOUNTER
After speaking with Latisha from ; patient will call office later this week and request a refill for the Vyvanse.

## 2019-04-15 NOTE — TELEPHONE ENCOUNTER
Spoke with patient and refill has been approved and printed; ready for  at ; patient will sign copy.

## 2019-04-15 NOTE — TELEPHONE ENCOUNTER
Pt came into office today to  adderral script. Pt states that she pharmacy will not let her fill it because it is not due for refill yet. Pt was thinking maybe chester meant to fill the vyvanse instead. because they are both for ADD. Pt states she is honestly not sure because she does not take her adderral everyday.  Please call pt with answers [phone]

## 2019-04-17 DIAGNOSIS — F90.2 ATTENTION DEFICIT HYPERACTIVITY DISORDER (ADHD), COMBINED TYPE: ICD-10-CM

## 2019-04-17 NOTE — TELEPHONE ENCOUNTER
Pt called to get a refill vyvanse refilled  Patient calling to request refill on:      Remaining pills:5  Last appt:Monday, February 04, 2019 09:30 AM  Next appt:    Pharmacy:      Patient aware of 72 hour time frame.

## 2019-04-17 NOTE — TELEPHONE ENCOUNTER
Please call Annette Borrego. Paper Rx ready for pickup. Orders Placed This Encounter    Lisdexamfetamine (VYVANSE) 70 mg cap     Sig: Take 1 Cap by mouth every morning. Max Daily Amount: 70 mg. Earliest fill date 04/23/2018     Dispense:  30 Cap     Refill:  0     On behalf of NP [employer]. -----------------------    I have reviewed this patient's report generated by the [facility] which does not demonstrate aberrancies and inconsistencies with regard to the historical prescribing of controlled medications to this patient by other providers. Controlled sub agreement is on file.

## 2019-04-19 ENCOUNTER — TELEPHONE (OUTPATIENT)
Dept: FAMILY MEDICINE CLINIC | Age: 54
End: 2019-04-19

## 2019-04-19 DIAGNOSIS — F90.2 ATTENTION DEFICIT HYPERACTIVITY DISORDER (ADHD), COMBINED TYPE: ICD-10-CM

## 2019-04-19 NOTE — TELEPHONE ENCOUNTER
Spoke with patient and pharmacist is asking if the script can be written for a #90 day supply which is what patient's insurance will pay for and at a lower cost; patient will bring in the script written for #30 if a #90 day is approved; please advise.

## 2019-04-19 NOTE — TELEPHONE ENCOUNTER
Patient is having trouble filling her prescription at the pharmacy. This is a rewritten prescription. Would like to speak with the nurse about it.

## 2019-04-19 NOTE — TELEPHONE ENCOUNTER
Prepared a #90 Rx however the #30 paper prescription MUST be returned prior to the patient receiving the #90 paper prescription. Orders Placed This Encounter    Lisdexamfetamine (VYVANSE) 70 mg cap     Sig: Take 1 Cap by mouth every morning. Max Daily Amount: 70 mg. Earliest fill date 04/23/2018     Dispense:  90 Cap     Refill:  0     On behalf of NP [employer].

## 2019-04-19 NOTE — TELEPHONE ENCOUNTER
Spoke with patient; will bring in #30 qty Rx in exchange for #90 qty Rx; patient arrived at office, turned in #30 qty Rx and was given #90 qty rx; #30 qty Rx was destroyed and witnessed by Edwige Álvarez., PEDRO.

## 2019-04-29 ENCOUNTER — DOCUMENTATION ONLY (OUTPATIENT)
Dept: SURGERY | Age: 54
End: 2019-04-29

## 2019-04-30 NOTE — PROGRESS NOTES
Patient visits RD weighing 241#. Goal weight for the next visit. Is 234#  RD reviewed diet history. Encouraged protocol.

## 2019-05-07 NOTE — TELEPHONE ENCOUNTER
Requested Prescriptions     Pending Prescriptions Disp Refills    citalopram (CELEXA) 40 mg tablet 90 Tab 1     Sig: Take 1 Tab by mouth daily. Patient calling to request refill on: citalopram      Remaining pills: 0   Last appt: 02/04/2019  Next appt: none    [facility]  Pt is requesting a 90 day supply for insurance purposes      Patient aware of 72 hour time frame.

## 2019-05-08 NOTE — TELEPHONE ENCOUNTER
Last ov 2-4-19, to follow up in 6 months.  (overdue for appt by 0 months)     Future Appointments   Date Time Provider Kolby Anglin   5/29/2019  2:00 PM [facility] DIETICIAN [facility] TATUM GALARZA         Last fill 4-23-18 #90 x1

## 2019-05-09 RX ORDER — CITALOPRAM 40 MG/1
40 TABLET, FILM COATED ORAL DAILY
Qty: 90 TAB | Refills: 1 | Status: SHIPPED | OUTPATIENT
Start: 2019-05-09 | End: 2019-10-26 | Stop reason: SDUPTHER

## 2019-05-29 ENCOUNTER — DOCUMENTATION ONLY (OUTPATIENT)
Dept: SURGERY | Age: 54
End: 2019-05-29

## 2019-06-26 ENCOUNTER — OFFICE VISIT (OUTPATIENT)
Dept: FAMILY MEDICINE CLINIC | Age: 54
End: 2019-06-26

## 2019-06-26 VITALS
TEMPERATURE: 98 F | OXYGEN SATURATION: 97 % | WEIGHT: 227 LBS | DIASTOLIC BLOOD PRESSURE: 87 MMHG | HEART RATE: 72 BPM | BODY MASS INDEX: 40.22 KG/M2 | RESPIRATION RATE: 20 BRPM | SYSTOLIC BLOOD PRESSURE: 127 MMHG | HEIGHT: 63 IN

## 2019-06-26 DIAGNOSIS — M25.561 ACUTE PAIN OF RIGHT KNEE: Primary | ICD-10-CM

## 2019-06-26 DIAGNOSIS — S89.91XA INJURY OF RIGHT KNEE, INITIAL ENCOUNTER: ICD-10-CM

## 2019-06-26 NOTE — PATIENT INSTRUCTIONS
Knee Pain or Injury: Care Instructions  Your Care Instructions    Injuries are a common cause of knee problems. Sudden (acute) injuries may be caused by a direct blow to the knee. They can also be caused by abnormal twisting, bending, or falling on the knee. Pain, bruising, or swelling may be severe, and may start within minutes of the injury. Overuse is another cause of knee pain. Other causes are climbing stairs, kneeling, and other activities that use the knee. Everyday wear and tear, especially as you get older, also can cause knee pain. Rest, along with home treatment, often relieves pain and allows your knee to heal. If you have a serious knee injury, you may need tests and treatment. Follow-up care is a key part of your treatment and safety. Be sure to make and go to all appointments, and call your doctor if you are having problems. It's also a good idea to know your test results and keep a list of the medicines you take. How can you care for yourself at home? · Be safe with medicines. Read and follow all instructions on the label. ? If the doctor gave you a prescription medicine for pain, take it as prescribed. ? If you are not taking a prescription pain medicine, ask your doctor if you can take an over-the-counter medicine. · Rest and protect your knee. Take a break from any activity that may cause pain. · Put ice or a cold pack on your knee for 10 to 20 minutes at a time. Put a thin cloth between the ice and your skin. · Prop up a sore knee on a pillow when you ice it or anytime you sit or lie down for the next 3 days. Try to keep it above the level of your heart. This will help reduce swelling. · If your knee is not swollen, you can put moist heat, a heating pad, or a warm cloth on your knee. · If your doctor recommends an elastic bandage, sleeve, or other type of support for your knee, wear it as directed.   · Follow your doctor's instructions about how much weight you can put on your leg. Use a cane, crutches, or a walker as instructed. · Follow your doctor's instructions about activity during your healing process. If you can do mild exercise, slowly increase your activity. · Reach and stay at a healthy weight. Extra weight can strain the joints, especially the knees and hips, and make the pain worse. Losing even a few pounds may help. When should you call for help? Call 911 anytime you think you may need emergency care. For example, call if:    · You have symptoms of a blood clot in your lung (called a pulmonary embolism). These may include:  ? Sudden chest pain. ? Trouble breathing. ? Coughing up blood.    Call your doctor now or seek immediate medical care if:    · You have severe or increasing pain.     · Your leg or foot turns cold or changes color.     · You cannot stand or put weight on your knee.     · Your knee looks twisted or bent out of shape.     · You cannot move your knee.     · You have signs of infection, such as:  ? Increased pain, swelling, warmth, or redness. ? Red streaks leading from the knee. ? Pus draining from a place on your knee. ? A fever.     · You have signs of a blood clot in your leg (called a deep vein thrombosis), such as:  ? Pain in your calf, back of the knee, thigh, or groin. ? Redness and swelling in your leg or groin.    Watch closely for changes in your health, and be sure to contact your doctor if:    · You have tingling, weakness, or numbness in your knee.     · You have any new symptoms, such as swelling.     · You have bruises from a knee injury that last longer than 2 weeks.     · You do not get better as expected. Where can you learn more? Go to http://barber-otd.info/. Enter K195 in the search box to learn more about \"Knee Pain or Injury: Care Instructions. \"  Current as of: September 23, 2018  Content Version: 11.9  © 7564-7475 [email or web address], [employer].  Care instructions adapted under license by [employer] (which disclaims liability or warranty for this information). If you have questions about a medical condition or this instruction, always ask your healthcare professional. [address] any warranty or liability for your use of this information.

## 2019-06-26 NOTE — PROGRESS NOTES
Caprice Vasquez is a 46 y.o. female (: 1965) presenting to address:    Chief Complaint   Patient presents with    Leg Pain     patient c/o RT leg pain x one month         pain scale 4/10       Vitals:    19 1127   BP: 127/87   Pulse: 72   Resp: 20   Temp: 98 °F (36.7 °C)   TempSrc: Oral   SpO2: 97%   Weight: 227 lb (103 kg)   Height: 5' 2.5\" (1.588 m)   PainSc:   4   PainLoc: Leg       Hearing/Vision:   No exam data present    Learning Assessment:     Learning Assessment 2016   PRIMARY LEARNER Patient   HIGHEST LEVEL OF EDUCATION - PRIMARY LEARNER  > 4 YEARS OF COLLEGE   BARRIERS PRIMARY LEARNER NONE   CO-LEARNER CAREGIVER Yes   CO-LEARNER NAME Silvino Borrego III   CO-LEARNER HIGHEST LEVEL OF EDUCATION > 4 YEARS OF COLLEGE   BARRIERS CO-LEARNER NONE   PRIMARY LANGUAGE ENGLISH   PRIMARY LANGUAGE CO-LEARNER ENGLISH    NEED No   LEARNER PREFERENCE PRIMARY DEMONSTRATION     LISTENING     -   LEARNER PREFERENCE CO-LEARNER DEMONSTRATION   LEARNING SPECIAL TOPICS none   ANSWERED BY patient   RELATIONSHIP SELF     Depression Screening:     3 most recent PHQ Screens 2017   Little interest or pleasure in doing things Not at all   Feeling down, depressed, irritable, or hopeless Several days   Total Score PHQ 2 1     Fall Risk Assessment:   No flowsheet data found. Abuse Screening:     Abuse Screening Questionnaire 2018   Do you ever feel afraid of your partner? N   Are you in a relationship with someone who physically or mentally threatens you? N   Is it safe for you to go home? Y     Coordination of Care Questionaire:   1. Have you been to the ER, urgent care clinic since your last visit? Hospitalized since your last visit? NO    2. Have you seen or consulted any other health care providers outside of the [address] [place] [address] since your last visit? Include any pap smears or colon screening. NO    Advanced Directive:   1. Do you have an Advanced Directive? NO    2.  Would you like information on Advanced Directives?  NO

## 2019-07-02 NOTE — PROGRESS NOTES
Erika Aaron is a 46 y.o.  female and presents with    Chief Complaint   Patient presents with    Leg Pain     patient c/o RT leg pain x one month         pain scale 4/10         Subjective:  Patient presents for c/o of injury to the right knee which is causing referred leg pain down leg into shin. She states 1 month ago she fell and landed on right knee she was fine immediately after but did note next day some increased pain over the lateral side of leg and knee cap. She has not wrapped it but did use ice and elevated with some success. She noticed that it is getting better but wanted to have follow up to ensure no problems. Current Outpatient Medications   Medication Sig Dispense Refill    citalopram (CELEXA) 40 mg tablet Take 1 Tab by mouth daily. 90 Tab 1    Lisdexamfetamine (VYVANSE) 70 mg cap Take 1 Cap by mouth every morning. Max Daily Amount: 70 mg. Earliest fill date 04/23/2018 90 Cap 0    dextroamphetamine-amphetamine (ADDERALL) 30 mg tablet Take 1 cap (30mg) by mouth daily  Earliest fill date 11/19/2018 90 Tab 0    traZODone (DESYREL) 100 mg tablet Take 1 Tab by mouth nightly. 90 Tab 1    Multivitamins with Fluoride (MULTI-VITAMIN PO) Take  by mouth.  calcium carb/vitamin D3/vit K1 (SOFT CHEWS CALCIUM PO) Take  by mouth.  ergocalciferol (ERGOCALCIFEROL) 50,000 unit capsule Take 1 Cap by mouth every seven (7) days. 12 Cap 3    acetaminophen (TYLENOL ARTHRITIS PAIN) 650 mg TbER Take 650 mg by mouth every eight (8) hours.  naloxone (NARCAN) 4 mg/actuation nasal spray Use 1 spray intranasally, then discard. Repeat with new spray every 2 min as needed for opioid overdose symptoms, alternating nostrils. 2 Each 0     Allergies   Allergen Reactions    Erythromycin Hives, Nausea and Vomiting and Other (comments)     several cramping    Erythromycin Base Nausea and Vomiting       Review of Systems   Constitutional: Negative for chills and fever. HENT: Negative.     Eyes: Negative for blurred vision. Respiratory: Negative for shortness of breath. Cardiovascular: Negative for chest pain and leg swelling. Gastrointestinal: Negative for abdominal pain, constipation, diarrhea, nausea and vomiting. Genitourinary: Negative. Musculoskeletal: Positive for joint pain. Right knee   Skin: Negative. Neurological: Negative for headaches. Psychiatric/Behavioral: Negative. Objective:  Vitals:    06/26/19 1127   BP: 127/87   Pulse: 72   Resp: 20   Temp: 98 °F (36.7 °C)   TempSrc: Oral   SpO2: 97%   Weight: 227 lb (103 kg)   Height: 5' 2.5\" (1.588 m)   PainSc:   4   PainLoc: Leg     General:   Well-groomed, well-nourished, in no distress, pleasant, alert, appropriate    MSK:   Normal full range of motion of right knee joint, 5/5 muscle strength throughout. Flexion/extension decreased with TTP of lateral side of knee. Assessment/Plan:      1. Acute pain of right knee  2. Injury of right knee, initial encounter  Continue ice/elevation, and tylenol not a candidate for NSAIDS given s/p gastric bypass. Encourage knee sleeve during day when on the knee and off in evenings. I have discussed the diagnosis with the patient and the intended plan as seen in the above orders. The patient has received an after-visit summary and questions were answered concerning future plans. I have discussed medication side effects and warnings with the patient as well. I have reviewed the plan of care with the patient, accepted their input and they are in agreement with the treatment goals. More than 1/2 of this 15 minute visit was spent in counselling and coordination of care, as described above.     Rekha Ross [facility]

## 2019-07-15 DIAGNOSIS — F90.2 ATTENTION DEFICIT HYPERACTIVITY DISORDER (ADHD), COMBINED TYPE: ICD-10-CM

## 2019-07-15 NOTE — TELEPHONE ENCOUNTER
Requested Prescriptions     Pending Prescriptions Disp Refills    Lisdexamfetamine (VYVANSE) 70 mg cap 90 Cap 0     Sig: Take 1 Cap by mouth every morning. Max Daily Amount: 70 mg. Earliest fill date 04/23/2018       Remaining pills:a few left  Last appt:06/26/19  Next appt:N/A    [facility]      Patient aware of 72 hour time frame.

## 2019-07-16 NOTE — TELEPHONE ENCOUNTER
LM to [facility]  Prescription ready for  and will be at the .

## 2019-08-12 DIAGNOSIS — F90.2 ATTENTION DEFICIT HYPERACTIVITY DISORDER (ADHD), COMBINED TYPE: ICD-10-CM

## 2019-08-12 NOTE — TELEPHONE ENCOUNTER
Patient calling to request refill on:adderall      Remaining pills:6  Last appt: Wednesday, June 26, 2019  Next appt:    Pharmacy:[facility]       Patient aware of 72 hour time frame.   Requested Prescriptions     Pending Prescriptions Disp Refills    dextroamphetamine-amphetamine (ADDERALL) 30 mg tablet 90 Tab 0     Sig: Take 1 cap (30mg) by mouth daily  Earliest fill date 11/19/2018

## 2019-08-15 ENCOUNTER — TELEPHONE (OUTPATIENT)
Dept: FAMILY MEDICINE CLINIC | Age: 54
End: 2019-08-15

## 2019-08-15 RX ORDER — DEXTROAMPHETAMINE SACCHARATE, AMPHETAMINE ASPARTATE, DEXTROAMPHETAMINE SULFATE AND AMPHETAMINE SULFATE 7.5; 7.5; 7.5; 7.5 MG/1; MG/1; MG/1; MG/1
TABLET ORAL
Qty: 90 TAB | Refills: 0 | Status: SHIPPED | OUTPATIENT
Start: 2019-08-15 | End: 2019-11-12 | Stop reason: SDUPTHER

## 2019-09-10 RX ORDER — TRAZODONE HYDROCHLORIDE 100 MG/1
TABLET ORAL
Qty: 90 TAB | Refills: 1 | Status: SHIPPED | OUTPATIENT
Start: 2019-09-10 | End: 2020-03-13

## 2019-10-14 DIAGNOSIS — F90.2 ATTENTION DEFICIT HYPERACTIVITY DISORDER (ADHD), COMBINED TYPE: ICD-10-CM

## 2019-10-18 ENCOUNTER — OFFICE VISIT (OUTPATIENT)
Dept: FAMILY MEDICINE CLINIC | Age: 54
End: 2019-10-18

## 2019-10-18 ENCOUNTER — HOSPITAL ENCOUNTER (OUTPATIENT)
Dept: LAB | Age: 54
Discharge: HOME | End: 2019-10-18
Payer: COMMERCIAL

## 2019-10-18 VITALS
RESPIRATION RATE: 22 BRPM | TEMPERATURE: 97.6 F | OXYGEN SATURATION: 99 % | SYSTOLIC BLOOD PRESSURE: 127 MMHG | WEIGHT: 226 LBS | BODY MASS INDEX: 40.04 KG/M2 | HEIGHT: 63 IN | DIASTOLIC BLOOD PRESSURE: 83 MMHG | HEART RATE: 96 BPM

## 2019-10-18 DIAGNOSIS — F90.2 ATTENTION DEFICIT HYPERACTIVITY DISORDER (ADHD), COMBINED TYPE: ICD-10-CM

## 2019-10-18 DIAGNOSIS — Z79.899 CONTROLLED SUBSTANCE AGREEMENT SIGNED: ICD-10-CM

## 2019-10-18 DIAGNOSIS — Z12.39 SCREENING FOR BREAST CANCER: ICD-10-CM

## 2019-10-18 DIAGNOSIS — Z23 ENCOUNTER FOR IMMUNIZATION: ICD-10-CM

## 2019-10-18 DIAGNOSIS — Z79.899 CONTROLLED SUBSTANCE AGREEMENT SIGNED: Primary | ICD-10-CM

## 2019-10-18 PROCEDURE — 80307 DRUG TEST PRSMV CHEM ANLYZR: CPT

## 2019-10-18 NOTE — PROGRESS NOTES
Lobo Palma is a 53 y.o.  female and presents with    Chief Complaint   Patient presents with    Behavioral Problem     flu shot today    Medication Refill         Subjective:  Patient presents for medication refill on ADHD medication. Patient states medication is working well no SEs with medication use. 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 Patient also requesting flu shot today. Patient will travel to [facility] in December for a wedding but will be staying for period of time would like to know if there are any concerns she should have going. Current Outpatient Medications   Medication Sig Dispense Refill    Lisdexamfetamine (VYVANSE) 70 mg cap Take 1 Cap by mouth every morning. Max Daily Amount: 70 mg. Earliest fill date 04/23/2018 90 Cap 0    traZODone (DESYREL) 100 mg tablet TAKE 1 TABLET BY MOUTH EVERY DAY AT NIGHT 90 Tab 1    dextroamphetamine-amphetamine (ADDERALL) 30 mg tablet Take 1 cap (30mg) by mouth daily 90 Tab 0    citalopram (CELEXA) 40 mg tablet Take 1 Tab by mouth daily. 90 Tab 1    Multivitamins with Fluoride (MULTI-VITAMIN PO) Take  by mouth.  calcium carb/vitamin D3/vit K1 (SOFT CHEWS CALCIUM PO) Take  by mouth.  ergocalciferol (ERGOCALCIFEROL) 50,000 unit capsule Take 1 Cap by mouth every seven (7) days. 12 Cap 3    acetaminophen (TYLENOL ARTHRITIS PAIN) 650 mg TbER Take 650 mg by mouth every eight (8) hours.  naloxone (NARCAN) 4 mg/actuation nasal spray Use 1 spray intranasally, then discard. Repeat with new spray every 2 min as needed for opioid overdose symptoms, alternating nostrils. 2 Each 0     Allergies   Allergen Reactions    Erythromycin Hives, Nausea and Vomiting and Other (comments)     several cramping    Erythromycin Base Nausea and Vomiting       Review of Systems   Constitutional: Negative for chills and fever. HENT: Negative. Eyes: Negative for blurred vision.    Respiratory: Negative for shortness of breath. Cardiovascular: Negative for chest pain and leg swelling. Gastrointestinal: Negative for abdominal pain, constipation, diarrhea, nausea and vomiting. Genitourinary: Negative. Musculoskeletal: Negative. Skin: Negative. Neurological: Negative for headaches. Psychiatric/Behavioral:        Positive focus and concentration issues off medication. Objective:  Vitals:    10/18/19 1001   BP: 127/83   Pulse: 96   Resp: 22   Temp: 97.6 °F (36.4 °C)   SpO2: 99%   Weight: 226 lb (102.5 kg)   Height: 5' 2.5\" (1.588 m)   PainSc:   0 - No pain     General:   Well-groomed, well-nourished, in no distress, pleasant, alert, appropriate    Neck:      Neck supple, no swelling, mass or tenderness or thyromegaly  Cardiovasc:   RRR,  no murmur, rubs or gallops. Pulmonary:    Lungs clear bilaterally, no wheezing, rales or rhonchi. Assessment/Plan:      1. Attention deficit hyperactivity disorder (ADHD), combined type   reviewed and ok refills provided  - Lisdexamfetamine (VYVANSE) 70 mg cap; Take 1 Cap by mouth every morning. Max Daily Amount: 70 mg. Earliest fill date 04/23/2018  Dispense: 90 Cap; Refill: 0  - COMPLIANCE DRUG SCREEN/PRESCRIPTION MONITORING; Future    2. Controlled substance agreement signed  Due for drug screen. Patient disclosed was in [place] visiting son 4 weeks ago and did take 2 Gummies with questionable CBD versus THC in them. No use since. Not habitual  - COMPLIANCE DRUG SCREEN/PRESCRIPTION MONITORING; Future    3. Encounter for immunization  - INFLUENZA VIRUS VAC QUAD,SPLIT,PRESV FREE SYRINGE IM    4. Screening for breast cancer  - DEON MAMMO BI SCREENING INCL CAD; Future    On trip to [facility] advised patient to go to [facility] and follow-up here as needed. I have discussed the diagnosis with the patient and the intended plan as seen in the above orders.   The patient has received an after-visit summary and questions were answered concerning future plans. I have discussed medication side effects and warnings with the patient as well. I have reviewed the plan of care with the patient, accepted their input and they are in agreement with the treatment goals. Follow-up and Dispositions    · Return in about 6 months (around 4/18/2020). More than 1/2 of this 15 minute visit was spent face to face in counselling and coordination of care, as described above. This document may have been created with the aid of dictation software. Text may contain errors, particularly phonetic errors.      Jc [id number]

## 2019-10-18 NOTE — PROGRESS NOTES
Myron Mahan is a 53 y.o. female (: 1965) presenting to address:    Chief Complaint   Patient presents with    Behavioral Problem     flu shot today    Medication Refill       Vitals:    10/18/19 1001   BP: 127/83   Pulse: 96   Resp: 22   Temp: 97.6 °F (36.4 °C)   SpO2: 99%   Weight: 226 lb (102.5 kg)   Height: 5' 2.5\" (1.588 m)   PainSc:   0 - No pain       Hearing/Vision:   No exam data present    Learning Assessment:     Learning Assessment 2016   PRIMARY LEARNER Patient   HIGHEST LEVEL OF EDUCATION - PRIMARY LEARNER  > 4 YEARS OF COLLEGE   BARRIERS PRIMARY LEARNER NONE   CO-LEARNER CAREGIVER Yes   CO-LEARNER NAME Silvino Borrego III   CO-LEARNER HIGHEST LEVEL OF EDUCATION > 4 YEARS OF COLLEGE   BARRIERS CO-LEARNER NONE   PRIMARY LANGUAGE ENGLISH   PRIMARY LANGUAGE CO-LEARNER ENGLISH    NEED No   LEARNER PREFERENCE PRIMARY DEMONSTRATION     LISTENING     -   LEARNER PREFERENCE CO-LEARNER DEMONSTRATION   LEARNING SPECIAL TOPICS none   ANSWERED BY patient   RELATIONSHIP SELF     Depression Screening:     3 most recent PHQ Screens 2017   Little interest or pleasure in doing things Not at all   Feeling down, depressed, irritable, or hopeless Several days   Total Score PHQ 2 1     Fall Risk Assessment:   No flowsheet data found. Abuse Screening:     Abuse Screening Questionnaire 2018   Do you ever feel afraid of your partner? N   Are you in a relationship with someone who physically or mentally threatens you? N   Is it safe for you to go home? Y     Coordination of Care Questionaire:   1. Have you been to the ER, urgent care clinic since your last visit? Hospitalized since your last visit? NO    2. Have you seen or consulted any other health care providers outside of the [address] [place] [address] since your last visit? Include any pap smears or colon screening. NO    Advanced Directive:   1. Do you have an Advanced Directive? NO    2.  Would you like information on Advanced Directives? NO    Flu Immunization/s administered 10/18/2019 by Adolfo Carrera with consent. Patient tolerated procedure well. No reactions noted.

## 2019-10-24 LAB — DRUGS UR: NORMAL

## 2019-10-30 RX ORDER — CITALOPRAM 40 MG/1
TABLET, FILM COATED ORAL
Qty: 90 TAB | Refills: 1 | Status: SHIPPED | OUTPATIENT
Start: 2019-10-30

## 2019-11-12 DIAGNOSIS — F90.2 ATTENTION DEFICIT HYPERACTIVITY DISORDER (ADHD), COMBINED TYPE: ICD-10-CM

## 2019-11-12 NOTE — TELEPHONE ENCOUNTER
Requested Prescriptions     Pending Prescriptions Disp Refills    dextroamphetamine-amphetamine (ADDERALL) 30 mg tablet 90 Tab 0     Sig: Take 1 cap (30mg) by mouth daily

## 2019-11-15 RX ORDER — DEXTROAMPHETAMINE SACCHARATE, AMPHETAMINE ASPARTATE, DEXTROAMPHETAMINE SULFATE AND AMPHETAMINE SULFATE 7.5; 7.5; 7.5; 7.5 MG/1; MG/1; MG/1; MG/1
TABLET ORAL
Qty: 90 TAB | Refills: 0 | Status: SHIPPED | OUTPATIENT
Start: 2019-11-15

## 2019-11-25 ENCOUNTER — TELEPHONE (OUTPATIENT)
Dept: SURGERY | Age: 54
End: 2019-11-25

## 2019-12-17 ENCOUNTER — TELEPHONE (OUTPATIENT)
Dept: FAMILY MEDICINE CLINIC | Age: 54
End: 2019-12-17

## 2019-12-17 NOTE — TELEPHONE ENCOUNTER
Did patient go to [facility] as previously recommended for recommendations on any medication such as antimalarials, and antibiotics. ?

## 2019-12-17 NOTE — TELEPHONE ENCOUNTER
Patient called stating that SARAHI Erwin wanted her to call the office to let her know whether or not she is going to [facility]. Patient stated that she is going to [facility] and would like the medication that SARAHI Erwin recommended to her for the trip.

## 2019-12-18 ENCOUNTER — TELEPHONE (OUTPATIENT)
Dept: FAMILY MEDICINE CLINIC | Age: 54
End: 2019-12-18

## 2019-12-18 RX ORDER — DOXYCYCLINE 100 MG/1
100 TABLET ORAL 2 TIMES DAILY
Qty: 20 TAB | Refills: 0 | Status: SHIPPED | OUTPATIENT
Start: 2019-12-18 | End: 2019-12-28

## 2019-12-18 NOTE — TELEPHONE ENCOUNTER
I called patient and she stated that she did go to [facility], but she didn't get recommendations. Patient stated she is going to go through her paperwork and see if it is recommendations are there and if not she will call the office and call us back with them.

## 2019-12-18 NOTE — TELEPHONE ENCOUNTER
Patient called back and stated she went though notes from [facility] and seen a note stating she needs script for Cipro or z-Giuseppe.

## 2019-12-18 NOTE — TELEPHONE ENCOUNTER
Cipro does not seem like a proper medication we typically do doxycycline or a Z-Giuseppe patient has allergy to Z-Giuseppe will order doxy.

## 2020-01-14 DIAGNOSIS — F90.2 ATTENTION DEFICIT HYPERACTIVITY DISORDER (ADHD), COMBINED TYPE: ICD-10-CM

## 2020-01-14 NOTE — TELEPHONE ENCOUNTER
Requested Prescriptions     Pending Prescriptions Disp Refills    Lisdexamfetamine (VYVANSE) 70 mg cap 90 Cap 0     Sig: Take 1 Cap by mouth every morning. Max Daily Amount: 70 mg. Earliest fill date 04/23/2018     No future appointments.

## 2020-01-15 NOTE — TELEPHONE ENCOUNTER
Called pt, left msg on home vm that pt needs to establish with a different provider because her provider has left the office. She has not had annual toxassure and is due for a follow up for controlled medication.

## 2020-01-16 DIAGNOSIS — F90.2 ATTENTION DEFICIT HYPERACTIVITY DISORDER (ADHD), COMBINED TYPE: ICD-10-CM

## 2020-01-16 NOTE — TELEPHONE ENCOUNTER
Patient called upset due to her just getting back in the country from [facility]  and finding out that NP Yovana Watt left the practice without notice. I gave this patient the next available appointment however she expressed her concerns about running out of her medication by tomorrow. This patient states that she has been on this medication for a very long time (20 years if i'm quoting her correctly) and would like enough medication to hold her over until she can be seen again. I informed this patient that due to this medication being a controlled substance the providers won't prescribe until she is seen again. This patient expressed her concerns to me about this practice she feels as though basically we have \"dropped the ball on her. \" I informed the patient that I would reach out to the provider to see how we can best help her in this situation. Please advise    Future Appointments   Date Time Provider Kolby Anglin   1/21/2020 11:00 AM Tannous, Severa Leaven, MD [facility] SCHED       Requested Prescriptions     Pending Prescriptions Disp Refills    Lisdexamfetamine (VYVANSE) 70 mg cap 90 Cap 0     Sig: Take 1 Cap by mouth every morning. Max Daily Amount: 70 mg.  Earliest fill date 04/23/2018

## 2020-01-16 NOTE — TELEPHONE ENCOUNTER
Please call Sandra Green    \"Dr. Saida Cadet, NP [facility] supervising physician, understands the unfortunate position that you have been placed in due to Lacy's departure from the practice. Continuity is important for quality care, and thus, she has authorized a 30d refill of the medication you requested. She strongly suggests that you discuss your existing conditions and medications when establishing care with a new PCP, reconciling your needs with his or her scope of practice, meaning if they are or are not comfortable with managing ADHD medications. \"    Thanks. Orders Placed This Encounter    Lisdexamfetamine (VYVANSE) 70 mg cap     Sig: Take 1 Cap by mouth every morning. Max Daily Amount: 70 mg. Dispense:  30 Cap     Refill:  0     -----------------    I have reviewed this patient's report generated by the [place] which does not demonstrate aberrancies and inconsistencies with regard to the historical prescribing of controlled medications to this patient by other providers. Reviewed chart. CSA signed from 4/23/2018.

## 2020-03-13 RX ORDER — TRAZODONE HYDROCHLORIDE 100 MG/1
TABLET ORAL
Qty: 30 TAB | Refills: 0 | Status: SHIPPED | OUTPATIENT
Start: 2020-03-13

## 2020-03-13 NOTE — TELEPHONE ENCOUNTER
Last ov 10/18/2019    Last refill 09/10/2019 qty 90 w 1 refill    No future appointments. SARAHI Sue is no longer practicing.   Pt to re-establish care for further refills

## 2020-11-05 ENCOUNTER — TELEPHONE (OUTPATIENT)
Dept: SURGERY | Age: 55
End: 2020-11-05

## 2022-03-18 PROBLEM — M51.36 DDD (DEGENERATIVE DISC DISEASE), LUMBAR: Status: ACTIVE | Noted: 2018-07-27

## 2022-03-18 PROBLEM — M48.062 LUMBAR STENOSIS WITH NEUROGENIC CLAUDICATION: Status: ACTIVE | Noted: 2018-07-27

## 2022-03-18 PROBLEM — E66.01 OBESITY, MORBID (HCC): Status: ACTIVE | Noted: 2018-01-03

## 2022-03-18 PROBLEM — M43.10 PARS DEFECT WITH SPONDYLOLISTHESIS: Status: ACTIVE | Noted: 2018-03-30

## 2022-03-19 PROBLEM — F90.2 ATTENTION DEFICIT HYPERACTIVITY DISORDER (ADHD), COMBINED TYPE: Status: ACTIVE | Noted: 2018-04-24

## 2022-03-19 PROBLEM — F32.A ANXIETY AND DEPRESSION: Status: ACTIVE | Noted: 2018-04-24

## 2022-03-19 PROBLEM — F41.9 ANXIETY AND DEPRESSION: Status: ACTIVE | Noted: 2018-04-24

## 2022-03-19 PROBLEM — Z79.899 CONTROLLED SUBSTANCE AGREEMENT SIGNED: Status: ACTIVE | Noted: 2018-04-24

## 2022-03-19 PROBLEM — M43.10 SPONDYLOLISTHESIS, GRADE 2: Status: ACTIVE | Noted: 2018-07-27

## 2022-12-20 ENCOUNTER — OFFICE VISIT (OUTPATIENT)
Dept: URGENT CARE | Facility: PHYSICIAN GROUP | Age: 57
End: 2022-12-20
Payer: COMMERCIAL

## 2022-12-20 VITALS
SYSTOLIC BLOOD PRESSURE: 130 MMHG | RESPIRATION RATE: 16 BRPM | HEART RATE: 94 BPM | DIASTOLIC BLOOD PRESSURE: 80 MMHG | WEIGHT: 236 LBS | BODY MASS INDEX: 43.43 KG/M2 | OXYGEN SATURATION: 100 % | HEIGHT: 62 IN | TEMPERATURE: 98.1 F

## 2022-12-20 DIAGNOSIS — J10.1 INFLUENZA A: Primary | ICD-10-CM

## 2022-12-20 DIAGNOSIS — R68.89 FLU-LIKE SYMPTOMS: ICD-10-CM

## 2022-12-20 DIAGNOSIS — Z20.822 SUSPECTED COVID-19 VIRUS INFECTION: ICD-10-CM

## 2022-12-20 DIAGNOSIS — R05.9 COUGH IN ADULT: ICD-10-CM

## 2022-12-20 PROBLEM — M43.10 PARS DEFECT WITH SPONDYLOLISTHESIS: Status: ACTIVE | Noted: 2018-03-30

## 2022-12-20 PROBLEM — F41.9 ANXIETY AND DEPRESSION: Status: ACTIVE | Noted: 2018-04-24

## 2022-12-20 PROBLEM — M48.062 LUMBAR STENOSIS WITH NEUROGENIC CLAUDICATION: Status: ACTIVE | Noted: 2018-07-27

## 2022-12-20 PROBLEM — M51.36 DDD (DEGENERATIVE DISC DISEASE), LUMBAR: Status: ACTIVE | Noted: 2018-07-27

## 2022-12-20 PROBLEM — M17.12 OSTEOARTHRITIS OF LEFT KNEE: Status: ACTIVE | Noted: 2022-08-31

## 2022-12-20 PROBLEM — J45.909 ASTHMA: Status: ACTIVE | Noted: 2018-11-19

## 2022-12-20 PROBLEM — F32.A ANXIETY AND DEPRESSION: Status: ACTIVE | Noted: 2018-04-24

## 2022-12-20 PROBLEM — D64.9 ANEMIA: Status: ACTIVE | Noted: 2022-12-20

## 2022-12-20 PROBLEM — M25.662 STIFFNESS OF LEFT KNEE: Status: ACTIVE | Noted: 2022-10-03

## 2022-12-20 PROBLEM — R26.2 DIFFICULTY WALKING: Status: ACTIVE | Noted: 2022-10-03

## 2022-12-20 PROBLEM — M43.10 SPONDYLOLISTHESIS, GRADE 2: Status: ACTIVE | Noted: 2018-07-27

## 2022-12-20 PROBLEM — F99 PSYCHIATRIC DISORDER: Status: ACTIVE | Noted: 2018-11-19

## 2022-12-20 PROBLEM — M25.562 ACUTE PAIN OF LEFT KNEE: Status: ACTIVE | Noted: 2022-10-03

## 2022-12-20 PROBLEM — E66.01 OBESITY, MORBID (HCC): Status: ACTIVE | Noted: 2018-01-03

## 2022-12-20 PROBLEM — F90.2 ATTENTION DEFICIT HYPERACTIVITY DISORDER (ADHD), COMBINED TYPE: Status: ACTIVE | Noted: 2018-04-24

## 2022-12-20 LAB
EXTERNAL QUALITY CONTROL: NORMAL
FLUAV+FLUBV AG SPEC QL IA: NORMAL
INT CON NEG: NORMAL
INT CON NEG: NORMAL
INT CON POS: NORMAL
INT CON POS: NORMAL
SARS-COV+SARS-COV-2 AG RESP QL IA.RAPID: NEGATIVE

## 2022-12-20 PROCEDURE — 87804 INFLUENZA ASSAY W/OPTIC: CPT | Performed by: NURSE PRACTITIONER

## 2022-12-20 PROCEDURE — 99203 OFFICE O/P NEW LOW 30 MIN: CPT | Mod: CS | Performed by: NURSE PRACTITIONER

## 2022-12-20 PROCEDURE — 87426 SARSCOV CORONAVIRUS AG IA: CPT | Performed by: NURSE PRACTITIONER

## 2022-12-20 RX ORDER — BENZONATATE 200 MG/1
200 CAPSULE ORAL 3 TIMES DAILY PRN
Qty: 60 CAPSULE | Refills: 0 | Status: SHIPPED | OUTPATIENT
Start: 2022-12-20

## 2022-12-20 RX ORDER — ERGOCALCIFEROL 1.25 MG/1
50000 CAPSULE ORAL
COMMUNITY
Start: 2022-12-05

## 2022-12-20 RX ORDER — LISDEXAMFETAMINE DIMESYLATE 70 MG/1
70 CAPSULE ORAL EVERY MORNING
COMMUNITY
Start: 2022-09-29

## 2022-12-20 RX ORDER — DEXTROMETHORPHAN HYDROBROMIDE AND PROMETHAZINE HYDROCHLORIDE 15; 6.25 MG/5ML; MG/5ML
5 SYRUP ORAL EVERY 4 HOURS PRN
Qty: 120 ML | Refills: 0 | Status: SHIPPED | OUTPATIENT
Start: 2022-12-20 | End: 2022-12-27

## 2022-12-20 RX ORDER — ESCITALOPRAM OXALATE 20 MG/1
20 TABLET ORAL DAILY
COMMUNITY
Start: 2022-12-19

## 2022-12-20 ASSESSMENT — ENCOUNTER SYMPTOMS
CHILLS: 1
SORE THROAT: 1
FEVER: 1
WHEEZING: 1
DIARRHEA: 1
NAUSEA: 0
HEADACHES: 1
ABDOMINAL PAIN: 0
VOMITING: 0
MYALGIAS: 1
COUGH: 1
SWEATS: 1

## 2022-12-20 NOTE — PROGRESS NOTES
Subjective:     Allyn Ceballos is a 57 y.o. female who presents for Cough (Producing stuff x 3 days), Wheezing (With chest tightness- mucines helps), Chills, Sweats, Body Aches, Headache, and Congestion (Grandaughters had tested pos flu 7 days ago)      Cough  This is a new problem. The current episode started in the past 7 days (Allyn is a pleasant 57 year old female who presents to  today with complaints of flu like symptoms X 3 days. She was exposed to the flu from her grandchildren.). The problem has been gradually improving. The cough is Non-productive. Associated symptoms include chills, ear pain, a fever, headaches, myalgias, a sore throat, sweats and wheezing. Treatments tried: Mucinex, Tylenol. The treatment provided mild relief. There is no history of asthma, bronchitis or pneumonia.   Wheezing   Associated symptoms include chills, coughing, diarrhea, ear pain, a fever, headaches and a sore throat. Pertinent negatives include no abdominal pain or vomiting. There is no history of asthma or pneumonia.   Chills  Associated symptoms include chills, congestion, coughing, a fever, headaches, myalgias and a sore throat. Pertinent negatives include no abdominal pain, nausea or vomiting.   Sweats  Associated symptoms include chills, congestion, coughing, a fever, headaches, myalgias and a sore throat. Pertinent negatives include no abdominal pain, nausea or vomiting.   Headache      Review of Systems   Constitutional:  Positive for chills, fever and malaise/fatigue.   HENT:  Positive for congestion, ear pain and sore throat.    Respiratory:  Positive for cough and wheezing.    Gastrointestinal:  Positive for diarrhea. Negative for abdominal pain, nausea and vomiting.   Musculoskeletal:  Positive for myalgias.   Neurological:  Positive for headaches.     PMH: No past medical history on file.  ALLERGIES:   Allergies   Allergen Reactions    Tramadol Unspecified    Erythromycin Unspecified, Hives and Vomiting     Other  "reaction(s): Other (comments)  several cramping  several cramping  Other reaction(s): GI intolerance, Other (comments)  several cramping  Other reaction(s): Other (comments)  several cramping      Prednisone Anaphylaxis     Patient experienced hallucinations.  Other reaction(s): Other (see comments)  Patient experienced hallucinations.       SURGHX: No past surgical history on file.  SOCHX:   Social History     Socioeconomic History    Marital status: Unknown   Tobacco Use    Smoking status: Never    Smokeless tobacco: Never   Vaping Use    Vaping Use: Never used   Substance and Sexual Activity    Alcohol use: Not Currently    Drug use: Never     FH: No family history on file.      Objective:   /80   Pulse 94   Temp 36.7 °C (98.1 °F) (Temporal)   Resp 16   Ht 1.575 m (5' 2\")   Wt 107 kg (236 lb)   SpO2 100%   BMI 43.16 kg/m²     Physical Exam  Vitals and nursing note reviewed.   Constitutional:       General: She is not in acute distress.     Appearance: Normal appearance. She is ill-appearing.   HENT:      Head: Normocephalic and atraumatic.      Right Ear: Tympanic membrane, ear canal and external ear normal. There is no impacted cerumen.      Left Ear: Tympanic membrane, ear canal and external ear normal. There is no impacted cerumen.      Nose: Congestion and rhinorrhea present.      Mouth/Throat:      Mouth: Mucous membranes are moist.      Pharynx: No oropharyngeal exudate or posterior oropharyngeal erythema.   Eyes:      Extraocular Movements: Extraocular movements intact.      Pupils: Pupils are equal, round, and reactive to light.   Cardiovascular:      Rate and Rhythm: Normal rate and regular rhythm.      Pulses: Normal pulses.      Heart sounds: Normal heart sounds.   Pulmonary:      Effort: Pulmonary effort is normal. No respiratory distress.      Breath sounds: Normal breath sounds. No stridor. No wheezing, rhonchi or rales.   Chest:      Chest wall: No tenderness.   Abdominal:      " General: Abdomen is flat. Bowel sounds are normal.      Palpations: Abdomen is soft.      Tenderness: There is no abdominal tenderness. There is no right CVA tenderness or left CVA tenderness.   Musculoskeletal:         General: Normal range of motion.      Cervical back: Normal range of motion and neck supple. Tenderness present.   Lymphadenopathy:      Cervical: No cervical adenopathy.   Skin:     General: Skin is warm and dry.      Capillary Refill: Capillary refill takes less than 2 seconds.   Neurological:      General: No focal deficit present.      Mental Status: She is alert and oriented to person, place, and time. Mental status is at baseline.   Psychiatric:         Mood and Affect: Mood normal.         Behavior: Behavior normal.         Thought Content: Thought content normal.         Judgment: Judgment normal.     Results for orders placed or performed in visit on 12/20/22   POCT Influenza A/B   Result Value Ref Range    Rapid Influenza A-B positive a     Internal Control Positive Valid     Internal Control Negative Valid    POCT SARS-COV Antigen JONATHAN (Symptomatic only)   Result Value Ref Range    Internal  Valid     SARS-COV ANTIGEN JONATHAN Negative Negative, Indeterminate, None Detected, Not Detected, Detected, NotDetected, Valid, Invalid, Pass    Internal Control Positive Valid     Internal Control Negative Valid        Assessment/Plan:   Assessment    1. Influenza A        2. Flu-like symptoms  POCT Influenza A/B      3. Suspected COVID-19 virus infection  POCT SARS-COV Antigen JONATHAN (Symptomatic only)      4. Cough in adult  promethazine-dextromethorphan (PROMETHAZINE-DM) 6.25-15 MG/5ML syrup    benzonatate (TESSALON) 200 MG capsule      Unfortunately, she does not meet bacteria for Tamiflu if she is out of the duration.  Cough medication sent to pharmacy for relief of cough.  Side effects discussed.  We discussed supportive measures including humidifier, warm salt water gargles,  over-the-counter Cepacol throat lozenges, rest  and increased fluids. Pt was encouraged to seek treatment back in the ER or urgent care for worsening symptoms,  fever greater than 100.5, wheezes or shortness of breath.    Pt educated on red flags and when to seek treatment back in ER or UC.

## 2023-05-26 RX ORDER — DEXTROAMPHETAMINE SACCHARATE, AMPHETAMINE ASPARTATE, DEXTROAMPHETAMINE SULFATE AND AMPHETAMINE SULFATE 7.5; 7.5; 7.5; 7.5 MG/1; MG/1; MG/1; MG/1
TABLET ORAL
COMMUNITY
Start: 2019-11-15

## 2023-05-26 RX ORDER — ERGOCALCIFEROL 1.25 MG/1
50000 CAPSULE ORAL
COMMUNITY
Start: 2018-03-09

## 2023-05-26 RX ORDER — CITALOPRAM 40 MG/1
1 TABLET ORAL DAILY
COMMUNITY
Start: 2019-10-30

## 2023-05-26 RX ORDER — SENNOSIDES 8.6 MG
650 CAPSULE ORAL EVERY 8 HOURS
COMMUNITY

## 2023-05-26 RX ORDER — TRAZODONE HYDROCHLORIDE 100 MG/1
1 TABLET ORAL NIGHTLY
COMMUNITY
Start: 2020-03-13

## 2023-05-26 RX ORDER — NALOXONE HYDROCHLORIDE 4 MG/.1ML
SPRAY NASAL
COMMUNITY
Start: 2018-08-29

## 2024-12-03 NOTE — TELEPHONE ENCOUNTER
Requested Prescriptions     Pending Prescriptions Disp Refills    traZODone (DESYREL) 50 mg tablet 90 Tab 1     Sig: Take 1 Tab by mouth nightly. Pt said she has been taking two tablets instead of one pill a day because she said Bailey Lesches was going to increase her dosage at her last visit. Remaining pills:0  Last appt:02/04/19  Next appt:N/A    Pharmacy:[facility]      Patient aware of 72 hour time frame. done

## (undated) DEVICE — SUTURE MCRYL SZ 4-0 L27IN ABSRB UD L19MM PS-2 1/2 CIR PRIM Y426H

## (undated) DEVICE — FILTER SMK EVAC FLO CLR MEGADYNE

## (undated) DEVICE — UNIVERSAL FIXATION CANNULA: Brand: VERSAONE

## (undated) DEVICE — STERILE POLYISOPRENE POWDER-FREE SURGICAL GLOVES WITH EMOLLIENT COATING: Brand: PROTEXIS

## (undated) DEVICE — GOWN,SIRUS,FABRNF,XL,20/CS: Brand: MEDLINE

## (undated) DEVICE — MEDI-VAC NON-CONDUCTIVE SUCTION TUBING: Brand: CARDINAL HEALTH

## (undated) DEVICE — DISSECTOR ULTRASONIC L48CM CRDLSS W/ TORQ WRNCH SONICISION

## (undated) DEVICE — 39" SINGLE PATIENT USE HOVERMATT BREATHABLE: Brand: SINGLE PATIENT USE HOVERMATT

## (undated) DEVICE — VISUALIZATION SYSTEM: Brand: CLEARIFY

## (undated) DEVICE — Device

## (undated) DEVICE — SOLUTION IRRIG 1000ML H2O STRL BLT

## (undated) DEVICE — TRAY CATH 16F URIN MTR LTX -- CONVERT TO ITEM 363111

## (undated) DEVICE — DERMABOND SKIN ADH 0.7ML -- DERMABOND ADVANCED 12/BX

## (undated) DEVICE — POWER SHELL: Brand: SIGNIA

## (undated) DEVICE — TUBING INSUFLTN 10FT LUER -- CONVERT TO ITEM 368568

## (undated) DEVICE — INFECTION CONTROL KIT SYS

## (undated) DEVICE — SURGICAL PROCEDURE KIT GEN LAPAROSCOPY LF

## (undated) DEVICE — SUTURING DEVICE: Brand: ENDO STITCH

## (undated) DEVICE — LIGHT HANDLE: Brand: DEVON

## (undated) DEVICE — DEVICE SUT 0 L48IN GRN POLY BRAID LD UNIT DISP SURGDAC

## (undated) DEVICE — SUTURE SZ 0 27IN 5/8 CIR UR-6  TAPER PT VIOLET ABSRB VICRYL J603H

## (undated) DEVICE — INSUFFLATION NEEDLE: Brand: SURGINEEDLE

## (undated) DEVICE — NEEDLE HYPO 22GA L1.5IN BLK S STL HUB POLYPR SHLD REG BVL

## (undated) DEVICE — BLADELESS OPTICAL TROCAR WITH FIXATION CANNULA: Brand: VERSAPORT

## (undated) DEVICE — KENDALL SCD EXPRESS SLEEVES, KNEE LENGTH, MEDIUM: Brand: KENDALL SCD

## (undated) DEVICE — SOLUTION IV 1000ML 0.9% SOD CHL

## (undated) DEVICE — Z INACTIVE USE 2240337 DRAPE SURG PT TRANSFER TRAWAY SHT

## (undated) DEVICE — DEVON™ KNEE AND BODY STRAP 60" X 3" (1.5 M X 7.6 CM): Brand: DEVON

## (undated) DEVICE — 3000CC GUARDIAN II: Brand: GUARDIAN

## (undated) DEVICE — REM POLYHESIVE ADULT PATIENT RETURN ELECTRODE: Brand: VALLEYLAB

## (undated) DEVICE — BLADELESS OPTICAL TROCAR WITH FIXATION CANNULA: Brand: VERSAONE

## (undated) DEVICE — 1200 GUARD II KIT W/5MM TUBE W/O VAC TUBE: Brand: GUARDIAN

## (undated) DEVICE — (D)PREP SKN CHLRAPRP APPL 26ML -- CONVERT TO ITEM 371833